# Patient Record
Sex: FEMALE | Race: WHITE | NOT HISPANIC OR LATINO | Employment: FULL TIME | URBAN - METROPOLITAN AREA
[De-identification: names, ages, dates, MRNs, and addresses within clinical notes are randomized per-mention and may not be internally consistent; named-entity substitution may affect disease eponyms.]

---

## 2017-02-10 ENCOUNTER — ALLSCRIPTS OFFICE VISIT (OUTPATIENT)
Dept: OTHER | Facility: OTHER | Age: 32
End: 2017-02-10

## 2017-03-29 ENCOUNTER — APPOINTMENT (OUTPATIENT)
Dept: LAB | Facility: HOSPITAL | Age: 32
End: 2017-03-29
Attending: INTERNAL MEDICINE
Payer: COMMERCIAL

## 2017-03-29 DIAGNOSIS — A04.8 OTHER SPECIFIED BACTERIAL INTESTINAL INFECTIONS: ICD-10-CM

## 2017-03-29 PROCEDURE — 87338 HPYLORI STOOL AG IA: CPT

## 2017-03-30 ENCOUNTER — GENERIC CONVERSION - ENCOUNTER (OUTPATIENT)
Dept: OTHER | Facility: OTHER | Age: 32
End: 2017-03-30

## 2017-03-30 LAB — H PYLORI AG STL QL IA: NEGATIVE

## 2017-05-09 ENCOUNTER — ALLSCRIPTS OFFICE VISIT (OUTPATIENT)
Dept: OTHER | Facility: OTHER | Age: 32
End: 2017-05-09

## 2017-05-09 DIAGNOSIS — N97.9 FEMALE INFERTILITY: ICD-10-CM

## 2017-05-09 DIAGNOSIS — Z00.00 ENCOUNTER FOR GENERAL ADULT MEDICAL EXAMINATION WITHOUT ABNORMAL FINDINGS: ICD-10-CM

## 2017-05-09 DIAGNOSIS — Z77.21 CONTACT WITH AND (SUSPECTED) EXPOSURE TO POTENTIALLY HAZARDOUS BODY FLUIDS: ICD-10-CM

## 2017-05-09 DIAGNOSIS — K21.9 GASTRO-ESOPHAGEAL REFLUX DISEASE WITHOUT ESOPHAGITIS: ICD-10-CM

## 2017-05-09 DIAGNOSIS — R10.13 EPIGASTRIC PAIN: ICD-10-CM

## 2017-05-11 ENCOUNTER — HOSPITAL ENCOUNTER (OUTPATIENT)
Dept: RADIOLOGY | Facility: HOSPITAL | Age: 32
Discharge: HOME | End: 2017-05-11
Payer: COMMERCIAL

## 2017-05-11 DIAGNOSIS — R10.13 EPIGASTRIC PAIN: ICD-10-CM

## 2017-05-11 PROCEDURE — 76705 ECHO EXAM OF ABDOMEN: CPT

## 2017-05-15 ENCOUNTER — ALLSCRIPTS OFFICE VISIT (OUTPATIENT)
Dept: OTHER | Facility: OTHER | Age: 32
End: 2017-05-15

## 2017-05-26 ENCOUNTER — TRANSCRIBE ORDERS (OUTPATIENT)
Dept: ADMINISTRATIVE | Facility: HOSPITAL | Age: 32
End: 2017-05-26

## 2017-05-26 ENCOUNTER — ALLSCRIPTS OFFICE VISIT (OUTPATIENT)
Dept: OTHER | Facility: OTHER | Age: 32
End: 2017-05-26

## 2017-05-26 DIAGNOSIS — N97.9 PRIMARY FEMALE INFERTILITY: Primary | ICD-10-CM

## 2017-05-30 ENCOUNTER — GENERIC CONVERSION - ENCOUNTER (OUTPATIENT)
Dept: OTHER | Facility: OTHER | Age: 32
End: 2017-05-30

## 2017-07-29 ENCOUNTER — TRANSCRIBE ORDERS (OUTPATIENT)
Dept: LAB | Facility: CLINIC | Age: 32
End: 2017-07-29

## 2017-07-29 ENCOUNTER — APPOINTMENT (OUTPATIENT)
Dept: LAB | Facility: CLINIC | Age: 32
End: 2017-07-29
Payer: COMMERCIAL

## 2017-07-29 DIAGNOSIS — Z77.21 PERSONAL HISTORY OF EXPOSURE TO POTENTIALLY HAZARDOUS BODY FLUIDS: Primary | ICD-10-CM

## 2017-07-29 DIAGNOSIS — Z00.00 ENCOUNTER FOR GENERAL ADULT MEDICAL EXAMINATION WITHOUT ABNORMAL FINDINGS: ICD-10-CM

## 2017-07-29 DIAGNOSIS — Z77.21 PERSONAL HISTORY OF EXPOSURE TO POTENTIALLY HAZARDOUS BODY FLUIDS: ICD-10-CM

## 2017-07-29 DIAGNOSIS — K21.9 GASTRO-ESOPHAGEAL REFLUX DISEASE WITHOUT ESOPHAGITIS: ICD-10-CM

## 2017-07-29 DIAGNOSIS — Z77.21 CONTACT WITH AND (SUSPECTED) EXPOSURE TO POTENTIALLY HAZARDOUS BODY FLUIDS: ICD-10-CM

## 2017-07-29 LAB
ALBUMIN SERPL BCP-MCNC: 4.1 G/DL (ref 3.5–5)
ALP SERPL-CCNC: 78 U/L (ref 46–116)
ALT SERPL W P-5'-P-CCNC: 22 U/L (ref 12–78)
ANION GAP SERPL CALCULATED.3IONS-SCNC: 6 MMOL/L (ref 4–13)
AST SERPL W P-5'-P-CCNC: 17 U/L (ref 5–45)
BASOPHILS # BLD AUTO: 0.04 THOUSANDS/ΜL (ref 0–0.1)
BASOPHILS NFR BLD AUTO: 1 % (ref 0–1)
BILIRUB SERPL-MCNC: 0.6 MG/DL (ref 0.2–1)
BUN SERPL-MCNC: 9 MG/DL (ref 5–25)
CALCIUM SERPL-MCNC: 8.9 MG/DL (ref 8.3–10.1)
CHLORIDE SERPL-SCNC: 103 MMOL/L (ref 100–108)
CHOLEST SERPL-MCNC: 205 MG/DL (ref 50–200)
CO2 SERPL-SCNC: 29 MMOL/L (ref 21–32)
CREAT SERPL-MCNC: 0.64 MG/DL (ref 0.6–1.3)
EOSINOPHIL # BLD AUTO: 0.25 THOUSAND/ΜL (ref 0–0.61)
EOSINOPHIL NFR BLD AUTO: 4 % (ref 0–6)
ERYTHROCYTE [DISTWIDTH] IN BLOOD BY AUTOMATED COUNT: 13 % (ref 11.6–15.1)
EST. AVERAGE GLUCOSE BLD GHB EST-MCNC: 114 MG/DL
GFR SERPL CREATININE-BSD FRML MDRD: 118 ML/MIN/1.73SQ M
GLUCOSE P FAST SERPL-MCNC: 101 MG/DL (ref 65–99)
HBA1C MFR BLD: 5.6 % (ref 4.2–6.3)
HCT VFR BLD AUTO: 44 % (ref 34.8–46.1)
HDLC SERPL-MCNC: 66 MG/DL (ref 40–60)
HGB BLD-MCNC: 14.3 G/DL (ref 11.5–15.4)
LDLC SERPL CALC-MCNC: 125 MG/DL (ref 0–100)
LYMPHOCYTES # BLD AUTO: 2.64 THOUSANDS/ΜL (ref 0.6–4.47)
LYMPHOCYTES NFR BLD AUTO: 37 % (ref 14–44)
MCH RBC QN AUTO: 30.7 PG (ref 26.8–34.3)
MCHC RBC AUTO-ENTMCNC: 32.5 G/DL (ref 31.4–37.4)
MCV RBC AUTO: 94 FL (ref 82–98)
MONOCYTES # BLD AUTO: 0.58 THOUSAND/ΜL (ref 0.17–1.22)
MONOCYTES NFR BLD AUTO: 8 % (ref 4–12)
NEUTROPHILS # BLD AUTO: 3.59 THOUSANDS/ΜL (ref 1.85–7.62)
NEUTS SEG NFR BLD AUTO: 50 % (ref 43–75)
PLATELET # BLD AUTO: 301 THOUSANDS/UL (ref 149–390)
PMV BLD AUTO: 10.1 FL (ref 8.9–12.7)
POTASSIUM SERPL-SCNC: 4.5 MMOL/L (ref 3.5–5.3)
PROT SERPL-MCNC: 8.1 G/DL (ref 6.4–8.2)
RBC # BLD AUTO: 4.66 MILLION/UL (ref 3.81–5.12)
SODIUM SERPL-SCNC: 138 MMOL/L (ref 136–145)
TRIGL SERPL-MCNC: 68 MG/DL
WBC # BLD AUTO: 7.1 THOUSAND/UL (ref 4.31–10.16)

## 2017-07-29 PROCEDURE — 85025 COMPLETE CBC W/AUTO DIFF WBC: CPT

## 2017-07-29 PROCEDURE — 80061 LIPID PANEL: CPT

## 2017-07-29 PROCEDURE — 83036 HEMOGLOBIN GLYCOSYLATED A1C: CPT

## 2017-07-29 PROCEDURE — 36415 COLL VENOUS BLD VENIPUNCTURE: CPT

## 2017-07-29 PROCEDURE — 80053 COMPREHEN METABOLIC PANEL: CPT

## 2017-12-22 ENCOUNTER — ALLSCRIPTS OFFICE VISIT (OUTPATIENT)
Dept: OTHER | Facility: OTHER | Age: 32
End: 2017-12-22

## 2017-12-23 NOTE — PROGRESS NOTES
Assessment   1  Acute bronchitis, unspecified organism (466 0) (J20 9)    Plan   Acute bronchitis, unspecified organism    · Start: Azithromycin 250 MG Oral Tablet; TAKE 2 TABLETS ON DAY 1 THEN TAKE 1    TABLET A DAY FOR 4 DAYS   · Follow Up if Not Better Evaluation and Treatment  Follow-up  Status: Complete  Done:    40TCD7377   · Drink plenty of fluids ; Status:Complete;   Done: 29LMJ3876   · Use a cough medicine to help you get adequate rest ; Status:Complete;   Done:    59AYC0744    Discussion/Summary      1  acute bronchitis: Symptoms consistent with viral infection  I have prescribed a 5 day course of azithromycin and instructed the patient to see how she feels over the next couple of days; if her symptoms worsen or her fever persists on Sunday she should fill the prescription and take the antibiotic  Otherwise she will use supportive measures including increased fluids, OTC expectorant cough medicine, warm fluids, and rest  She has flonase at home and I suggested she use that 2 sprays BID while symptomatic  Pt verbalizes understanding  The patient was counseled regarding instructions for management,-- risk factor reductions,-- prognosis,-- impressions,-- risks and benefits of treatment options  The treatment plan was reviewed with the patient/guardian  The patient/guardian understands and agrees with the treatment plan      Chief Complaint   patient is here for a cold  History of Present Illness   HPI: Pt is a 33 y/o female who is seen today with a 3 day hx of cough and congestion with fever/chills, and body aches  Her cough is productive of thick white sputum  She denies chest pain but reports shortness of breath with heavy exertion  She denies headache, dizziness, or ear pain  She was ill with a GI bug in the days just prior to URI sx onset but reports it is resolved  no present N/v/D  Home treatments include dayquil, nyquil, and theraflu  She was immunized against influenza this season  Hospital Based Practices Required Assessment:      Pain Assessment      the patient states they do not have pain  Readiness To Learn: Receptive  Barriers To Learning: none  Education Completed: disease/condition,-- medications-- and-- further treatment/follow-up      Teaching Method: verbal      Person Taught: patient      Evaluation Of Learning: verbalized/demonstrated understanding      Review of Systems        Constitutional: fever,-- feeling poorly,-- chills-- and-- feeling tired, but-- as noted in HPI       ENT: sore throat, but-- as noted in HPI,-- no earache-- and-- no nasal discharge  Cardiovascular: no chest pain-- and-- no palpitations  Respiratory: cough-- and-- shortness of breath during exertion, but-- as noted in HPI,-- no shortness of breath-- and-- no wheezing  Gastrointestinal: no nausea,-- no vomiting-- and-- no diarrhea  Musculoskeletal: myalgias  Neurological: no headache-- and-- no dizziness  ROS reviewed  Active Problems   1  Anxiety (300 00) (F41 9)  2  Dyspepsia (536 8) (R10 13)  3  Exposure to blood or body fluid (V15 85) (Z77 21)  4  GERD without esophagitis (530 81) (K21 9)  5  History of cervical dysplasia (V13 22) (Z87 410)  6  Irregular periods/menstrual cycles (626 4) (N92 6)  7  Primary female infertility (628 9) (N97 9)    Past Medical History   Active Problems And Past Medical History Reviewed: The active problems and past medical history were reviewed and updated today  Surgical History   Surgical History Reviewed: The surgical history was reviewed and updated today  Social History    · Exercise habits   ·    · Never a smoker   · Sexually active   · Social alcohol use (Z78 9)  The social history was reviewed and updated today  The social history was reviewed and is unchanged  Family History   Family History Reviewed: The family history was reviewed and updated today  Current Meds   1  Pantoprazole Sodium 40 MG Oral Tablet Delayed Release; TAKE 1 TABLET DAILY; Therapy: 74UKK1590 to (Evaluate:12Uri2703)  Requested for: 46XPK0892; Last     Rx:71Uqi3245 Ordered  2  Prenatal TABS; Therapy: (Recorded:20Jan2016) to Recorded     The medication list was reviewed and updated today  Allergies   1  No Known Drug Allergies    Vitals    Recorded: 22Dec2017 12:54PM   Temperature 99 2 F   Heart Rate 103   Respiration 18   Systolic 689   Diastolic 70   Height 5 ft 2 in   Weight 149 lb 0 8 oz   BMI Calculated 27 26   BSA Calculated 1 69   O2 Saturation 97     Physical Exam        Constitutional      General appearance: No acute distress, well appearing and well nourished  appears tired  Eyes      Conjunctiva and lids: No swelling, erythema or discharge  Ears, Nose, Mouth, and Throat      External inspection of ears and nose: Normal        Otoscopic examination: Tympanic membranes translucent with normal light reflex  Canals patent without erythema  Nasal mucosa, septum, and turbinates: Abnormal   There was clear rhinorrhea from both nares  The bilateral nasal mucosa was edematous-- and-- red  Oropharynx: Abnormal   The posterior pharynx was erythematous, but-- did not have an exudate  There was no concretions of both tonsils  There was enlargement of the left tonsil  Pulmonary      Respiratory effort: No increased work of breathing or signs of respiratory distress  Auscultation of lungs: Clear to auscultation  Cardiovascular      Auscultation of heart: Normal rate and rhythm, normal S1 and S2, without murmurs  Lymphatic      Palpation of lymph nodes in neck: No lymphadenopathy         Psychiatric      Orientation to person, place, and time: Normal        Mood and affect: Normal           Signatures    Electronically signed by : Keke Morton, ; Dec 22 2017  1:32PM EST                       (Author)     Electronically signed by : David Ragsdale DO; Dec 22 2017  4:21PM EST                       (Author)

## 2018-01-05 ENCOUNTER — HOSPITAL ENCOUNTER (EMERGENCY)
Facility: HOSPITAL | Age: 33
Discharge: HOME | End: 2018-01-05
Admitting: EMERGENCY MEDICINE
Payer: OTHER MISCELLANEOUS

## 2018-01-05 VITALS
BODY MASS INDEX: 25.76 KG/M2 | RESPIRATION RATE: 18 BRPM | SYSTOLIC BLOOD PRESSURE: 135 MMHG | HEIGHT: 62 IN | OXYGEN SATURATION: 98 % | DIASTOLIC BLOOD PRESSURE: 83 MMHG | WEIGHT: 140 LBS | HEART RATE: 86 BPM | TEMPERATURE: 98.6 F

## 2018-01-05 DIAGNOSIS — S46.911A MUSCLE STRAIN OF RIGHT UPPER ARM, INITIAL ENCOUNTER: Primary | ICD-10-CM

## 2018-01-05 PROCEDURE — 99283 EMERGENCY DEPT VISIT LOW MDM: CPT

## 2018-01-05 NOTE — DISCHARGE INSTRUCTIONS
Muscle Strain   WHAT YOU NEED TO KNOW:   What is a muscle strain? A muscle strain is a twist, pull, or tear of a muscle or tendon  A tendon is a strong elastic tissue that connects a muscle to a bone  What causes a muscle strain? Stretching a muscle too much may cause a muscle strain  A strain may also happen when a muscle is used too much without rest  Leg muscle strains are more common in people who play sports, run, dance, and water-ski  Strains in the muscles of the abdomen may happen when you play volleyball, tennis, golf, or baseball and when you dive  Low back muscle strains may occur when you lift heavy objects or when you wrestle or do gymnastics  What are the types of muscle strains? · A mild strain  is also called a first-degree strain  It is a tear of a few muscle fibers with little swelling  You may have very little or no loss of muscle strength  · A moderate strain  is also called a second-degree strain  There is more damage to your muscle or tendon, and it is weaker than it was before the injury  · A severe strain  is also called a third-degree strain  This tear goes along the whole length of the muscle, and you are unable to use the muscle at all  What increases my risk of a muscle strain? · Older age    · Muscle fatigue (tiredness)    · Not warming up before exercise    · Past muscle injury, or going back to your usual activity before your injury has healed    · Stiff, tight, and weak muscles    · Training longer or farther than your usual time or distance     · Problems with your feet, or your legs being different lengths  What are the signs and symptoms of a muscle strain? The signs and symptoms of a muscle strain depend on how badly your muscle is injured  The signs and symptoms may or may not show up right away when the injury happens   You may have one or more of the following:  · Bruised skin on the area of your injured muscle    · Muscle soreness, cramps, or spasms    · Little or stiff muscle movement, or loss of muscle strength    · Swelling in the area of the injury    · Muscle pain that gets worse with activity, or pain that moves or spreads to another body area    · Crepitus (crackling sound or grating feeling) when you move your muscle  How is a muscle strain diagnosed? Your healthcare provider will ask about diseases or injuries you have had in the past  He may touch and press parts of your muscle  He may bend, stretch, or move your joint certain ways  You may also have any of the following tests:  · X-ray: This is a picture of the bones and tissues in your body  X-rays may be done to make sure that you did not break a bone when your muscle strain happened  · Magnetic resonance imaging: This test is also called MRI  During the MRI, pictures of your muscles are taken  An MRI may be used to check for tears or other muscle injuries  It may also be used to look at your joints, bones, or blood vessels  You may be given dye through your vein before the pictures are taken  This helps your body parts show up better  Tell your healthcare provider if you are allergic to shellfish (lobster, crab, or shrimp)  People who are allergic to shellfish may also be allergic to some dyes  · Computed tomography scan: This is also called CT scan  A x-ray machine uses a computer to take pictures of your arms, legs, back, or abdomen  It is used to check for muscle injuries, broken bones, and damaged blood vessels  · Ultrasound: An ultrasound uses sound waves to show pictures of your muscles and tissues on a monitor  What can I do to help a muscle strain heal?   · 3 to 7 days after the injury:  Use Rest, Ice, Compression, and Elevation (RICE) to help stop bruising and decrease pain and swelling  ¨ Rest:  Rest your muscle to allow your injury to heal  When the pain decreases, begin normal, slow movements   For mild and moderate muscle strains, you should rest your muscles for about 2 days  However, if you have a severe muscle strain, you should rest for 10 to 14 days  You may need to use crutches to walk if your muscle strain is in your legs or lower body  ¨ Ice:  Put an ice pack on the injured area  Put a towel between the ice pack and your skin  Do not put the ice pack directly on your skin  You can use a package of frozen peas instead of an ice pack  ¨ Compression:  You may need to wrap an elastic bandage around the area to decrease swelling  It should be tight enough for you to feel support  Do not wrap it too tightly  ¨ Elevation:  Keep the injured muscle raised above your heart if possible  For example, if you have a strain of your lower leg muscle, lie down and prop your leg up on pillows  This helps decrease pain and swelling  · 3 to 21 days after your injury:  Start to slowly and regularly exercise your strained muscle  This will help it heal  If you feel pain, decrease how hard you are exercising  · 1 to 6 weeks after your injury:  Stretch the injured muscle  Stretch the muscle for about 30 seconds  Do this 4 times a day  You may stretch the muscle until you feel a slight pull  Stop stretching if you feel pain  · 2 weeks to 6 months after your injury:  The goal of this phase is to return to the activity you were doing before the injury without hurting the muscle again  · 3 weeks to 6 months after your injury:  Keep stretching and strengthening your muscles to avoid injury  Slowly increase the time and distance that you exercise  You may still have signs and symptoms of muscle strain 6 months after the injury, even if you do things to help it heal  In this case, you may need surgery on the muscle  How is a muscle strain treated? · Medicines:      ¨ NSAIDs:  This medicine is used to decrease pain and inflammation  It can be bought without a doctor's order  NSAIDs can cause stomach bleeding or kidney problems if they are not taken correctly   Always read the medicine label and follow the directions on it before you use this medicine  You should only use this medicine for 3 to 7 days after the injury happened  ¨ Muscle relaxers  help decrease pain and muscle spasms  ¨ Steroid medicines: Your healthcare provider may recommend a steroid injection to decrease pain and inflammation  ¨ Local anesthetic:  This can be used to numb the are for a short time  This is often used if you have a muscle strain in your back  · Physical therapy:  A physical therapist teaches you exercises to help improve movement and strength, and to decrease pain  · Surgery:  healthcare providers may recommend surgery if your muscle strain does not heal after 6 months of treatments  Surgery may be done to drain blood that has pooled in your muscle  If your tendon was torn off of the bone, it may be put back with surgery  How can a muscle strain be prevented? · Always wear proper shoes when you play sports:  Replace your old running shoes with new ones often if you are a runner  Use special shoe inserts or arch supports to correct leg or foot problems  Ask your healthcare provider for more information on shoe supports  · Do warm up and cool down exercises:  Do stretching exercises before you work out or do sports activities  These exercises will help loosen and decrease stress on your muscles  Cool down and stretch after your workout  Do not stop and rest after a workout without cooling down first            · Keep your muscles strong with strength training exercises:  Exercises such as weight lifting and stretching exercises help keep your muscles flexible and strong  A physical therapist or  may help you with these exercises  · Slowly start your exercise or sports training program:  Follow your healthcare provider's advice on when to start exercising  Slowly increase time, distance, and how often you train   Sudden increases in how often you train may cause you to injure your muscle again  When should I call my healthcare provider? · Your pain and swelling worsen or do not go away  · You have questions or concerns about your care or treatment  When should I seek immediate care? · You suddenly cannot feel or move your injured muscle  CARE AGREEMENT:   You have the right to help plan your care  Learn about your health condition and how it may be treated  Discuss treatment options with your caregivers to decide what care you want to receive  You always have the right to refuse treatment  The above information is an  only  It is not intended as medical advice for individual conditions or treatments  Talk to your doctor, nurse or pharmacist before following any medical regimen to see if it is safe and effective for you  © 2017 [employer]  Information is for End User's use only and may not be sold, redistributed or otherwise used for commercial purposes  All illustrations and images included in CareNotes® are the copyrighted property of [employer]  or [employer]

## 2018-01-05 NOTE — ED PROVIDER NOTES
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 yesterday  Patient reports that she pulled harder than she normally does when she lifts patients and states that initially she did not have pain however today she has pain over her right biceps  She states that she has full range of motion in her shoulder  Denies having any history of shoulder injuries in the past   Denies any fever, chills, numbness or tingling  Patient states that she has not taken anything for pain  None       Past Medical History:   Diagnosis Date    GERD (gastroesophageal reflux disease)        Past Surgical History:   Procedure Laterality Date    AL ESOPHAGOGASTRODUODENOSCOPY TRANSORAL DIAGNOSTIC N/A 6/30/2016    Procedure: ESOPHAGOGASTRODUODENOSCOPY (EGD); Surgeon: Tristen Diallo MD;  Location: [facility]; Service: Gastroenterology       History reviewed  No pertinent family history  I have reviewed and agree with the history as documented  Social History   Substance Use Topics    Smoking status: Former Smoker    Smokeless tobacco: Never Used    Alcohol use Yes      Comment: couple drinks a week        Review of Systems   Constitutional: Negative for chills and fever  Gastrointestinal: Negative for nausea and vomiting  Musculoskeletal: Positive for myalgias  Negative for arthralgias, joint swelling and neck pain  Skin: Negative for color change, pallor, rash and wound  Neurological: Negative for weakness and numbness         Physical Exam  ED Triage Vitals [01/05/18 1138]   Temperature Pulse Respirations Blood Pressure SpO2   98 6 °F (37 °C) 86 18 135/83 98 %      Temp Source Heart Rate Source Patient Position - Orthostatic VS BP Location FiO2 (%)   Oral Monitor Sitting Left arm --      Pain Score       3           Orthostatic Vital Signs  Vitals:    01/05/18 1138   BP: 135/83   Pulse: 86   Patient Position - Orthostatic VS: Sitting       Physical Exam   Constitutional: She is oriented to person, place, and time  She appears well-developed and well-nourished  She is cooperative  No distress  HENT:   Head: Normocephalic and atraumatic  Cardiovascular: Normal rate and normal heart sounds  No murmur heard  Pulmonary/Chest: Effort normal and breath sounds normal    Musculoskeletal: Normal range of motion  She exhibits tenderness  She exhibits no edema or deformity  Right upper arm: She exhibits tenderness  She exhibits no bony tenderness, no swelling, no edema, no deformity and no laceration  Arms:  Full AROM and 5/5 strength in UE b/l  NVI  ttp over right biceps muscle  No bruising, swelling, erythema noted  Neurological: She is alert and oriented to person, place, and time  Skin: Skin is warm  Capillary refill takes less than 2 seconds  No rash noted  She is not diaphoretic  No erythema  Psychiatric: She has a normal mood and affect         ED Medications  Medications - No data to display    Diagnostic Studies  Results Reviewed     None                 No orders to display              Procedures  Procedures       Phone Contacts  ED Phone Contact    ED Course  ED Course                                MDM  CritCare Time    Disposition  Final diagnoses:   Muscle strain of right upper arm, initial encounter     Time reflects when diagnosis was documented in both MDM as applicable and the Disposition within this note     Time User Action Codes Description Comment    1/5/2018  1:07 PM Fabby Martines Add [P24 [id number]] Muscle strain of right upper arm, initial encounter       ED Disposition     ED Disposition Condition Comment    Discharge  [facility] discharge to home/self care  Condition at discharge: Good        Follow-up Information     Follow up With Specialties Details Why [address], DO Internal Medicine Schedule an appointment as soon as possible for a visit in 1 week As needed [address]  [address] [place] [address] [address]  [phone]          There are no discharge medications for this patient  No discharge procedures on file      ED Provider  Electronically Signed by           Goldie Connors PA-C  01/09/18 1112

## 2018-01-10 NOTE — RESULT NOTES
Message   please let her know her H  pylori test is negative  Verified Results  (1) Jace Norton, STOOL 14GIN1114 07:17AM Carrillo Gone   TW Order Number: [id number]  TW Order Number: [id number]     Test Name Result Flag Reference   H PYLORI ANTIGEN Negative  Negative   Performing Comments: STOP OMEPRAZOLE FOR 2 WEEKS PRIOR TO TESTING  Performing Comments: STOP OMEPRAZOLE FOR 2 WEEKS PRIOR TO TESTING        Performed at:  [address] [employer] [address]  [id number]  : Jimmy Valentin MD, Phone:  [phone]

## 2018-01-11 NOTE — RESULT NOTES
Message   H pylori  indiced gastritis  Pylera for 10 days  sample given  Take omeprazole 20 mg po bid  Will check stool antigen  to confirm eradication  Verified Results  (1) TISSUE EXAM 30Jun2016 11:28AM Deepak Mixon     Test Name Result Flag Reference   LAB AP CASE REPORT (Report)     Surgical Pathology Report             Case: [id number]                   Authorizing Provider: Bridget Vasques MD      Collected:      06/30/2016 1128        Ordering Location:   [address] [place] [address]    Received:      07/01/2016 [address] [place] [address] Endoscopy                            Pathologist:      Annie Gupta MD                              Specimens:  A) - Duodenum, Duodenal bx, r/o celiac                                 B) - Stomach, Antral bx, r/o h pylori                                 C) - Stomach, Bx of gastric body, gastritis, r/o h pylori   LAB AP FINAL DIAGNOSIS (Report)     A  Duodenal biopsy:  - Benign, unremarkable duodenal mucosa  - No villous atrophy, no intraepithelial lymphocytosis or crypt   hyperplasia to suggest malabsorptive enteropathy   - No chronic or active duodenitis  - No glandular dysplasia and no evidence of malignancy  B  Gastric antral biopsy:  - Chronic focally active antral & oxyntic gastritis, POSITIVE for   curvilinear Helicobacter pylori, confirmed by immunohistochemical stains,   evaluated with appropriate positive & negative controls  - No atrophy or intestinal metaplasia identified   - No epithelial dysplasia and no evidence of malignancy  C  Gastric body biopsy:  - Chronic active oxyntic gastritis, POSITIVE for curvilinear Helicobacter   pylori, confirmed by immunohistochemical stains, evaluated with   appropriate positive & negative controls  - Separate portion of benign duodenal-type mucosa with nonspecific,   chronic inactive enteritis  - No epithelial dysplasia and no evidence of malignancy      Interpretation performed at [place] [address] [address] [place], [place] [address]  Electronically signed by Maciej Bolivar MD on 7/5/2016 at 4:12 PM   LAB AP SURGICAL ADDITIONAL INFORMATION (Report)     These tests were developed and their performance characteristics   determined by [facility] or [facility]  They may not be cleared or approved by the [place]  Food and   Drug Administration  The FDA has determined that such clearance or   approval is not necessary  These tests are used for clinical purposes  They should not be regarded as investigational or for research  This   laboratory has been approved by CLIA 88, designated as a high-complexity   laboratory and is qualified to perform these tests  LAB AP GROSS DESCRIPTION (Report)     A  The specimen is received in formalin, labeled with the patient's name   and hospital number, and is designated small bowel duodenal biopsy  The   specimen consists of 5 tan soft tissue fragments ranging in greatest   dimension from 0 1 to 0 3 centimeters  Entirely submitted  One cassette  B  The specimen is received in formalin, labeled with the patient's name   and hospital number, and is designated stomach antral biopsy  The   specimen consists of 2 tan soft tissue fragments each measuring 0 3   centimeters in greatest dimension  Entirely submitted  One cassette  C  The specimen is received in formalin, labeled with the patient's name   and hospital number, and is designated stomach biopsies gastric body  The specimen consists of 3 tan soft tissue fragments ranging in greatest   dimension from 0 1 to 0 5 centimeters  Entirely submitted  One cassette  Note: The estimated total formalin fixation time based upon information   provided by the submitting clinician and the standard processing schedule   is 26 5 hours  BMV  Plan  H  pylori infection    · (1) HELICOBACTER PYLORI ANTIGEN, STOOL; Status:Active;  Requested  for:20Oct2016;

## 2018-01-13 VITALS
SYSTOLIC BLOOD PRESSURE: 111 MMHG | HEIGHT: 62 IN | BODY MASS INDEX: 28.52 KG/M2 | RESPIRATION RATE: 18 BRPM | DIASTOLIC BLOOD PRESSURE: 77 MMHG | WEIGHT: 155 LBS | HEART RATE: 81 BPM | OXYGEN SATURATION: 98 % | TEMPERATURE: 97 F

## 2018-01-14 VITALS
BODY MASS INDEX: 28.52 KG/M2 | HEIGHT: 62 IN | SYSTOLIC BLOOD PRESSURE: 120 MMHG | DIASTOLIC BLOOD PRESSURE: 68 MMHG | WEIGHT: 155 LBS

## 2018-01-14 VITALS
SYSTOLIC BLOOD PRESSURE: 116 MMHG | WEIGHT: 157 LBS | BODY MASS INDEX: 28.89 KG/M2 | HEIGHT: 62 IN | HEART RATE: 80 BPM | OXYGEN SATURATION: 99 % | TEMPERATURE: 97.7 F | DIASTOLIC BLOOD PRESSURE: 78 MMHG

## 2018-01-14 VITALS
WEIGHT: 156 LBS | SYSTOLIC BLOOD PRESSURE: 100 MMHG | DIASTOLIC BLOOD PRESSURE: 60 MMHG | HEIGHT: 62 IN | BODY MASS INDEX: 28.71 KG/M2 | HEART RATE: 84 BPM

## 2018-01-23 VITALS
TEMPERATURE: 99.2 F | SYSTOLIC BLOOD PRESSURE: 122 MMHG | DIASTOLIC BLOOD PRESSURE: 70 MMHG | OXYGEN SATURATION: 97 % | WEIGHT: 149.05 LBS | BODY MASS INDEX: 27.43 KG/M2 | RESPIRATION RATE: 18 BRPM | HEART RATE: 103 BPM | HEIGHT: 62 IN

## 2018-01-23 NOTE — MISCELLANEOUS
Message  Return to work or school:  12/22/17   She is able to return to work on  12/27/17            Signatures   Electronically signed by : Cami Weeks, ; Dec 22 2017  1:15PM EST                       (Author)

## 2018-02-13 ENCOUNTER — OFFICE VISIT (OUTPATIENT)
Dept: OBGYN CLINIC | Facility: CLINIC | Age: 33
End: 2018-02-13
Payer: COMMERCIAL

## 2018-02-13 VITALS
DIASTOLIC BLOOD PRESSURE: 60 MMHG | BODY MASS INDEX: 24.41 KG/M2 | SYSTOLIC BLOOD PRESSURE: 102 MMHG | WEIGHT: 143 LBS | HEIGHT: 64 IN

## 2018-02-13 DIAGNOSIS — Z12.31 ENCOUNTER FOR SCREENING MAMMOGRAM FOR MALIGNANT NEOPLASM OF BREAST: Primary | ICD-10-CM

## 2018-02-13 PROCEDURE — 99385 PREV VISIT NEW AGE 18-39: CPT | Performed by: OBSTETRICS & GYNECOLOGY

## 2018-02-13 PROCEDURE — G0145 SCR C/V CYTO,THINLAYER,RESCR: HCPCS | Performed by: OBSTETRICS & GYNECOLOGY

## 2018-02-13 RX ORDER — PNV NO.95/FERROUS FUM/FOLIC AC 28MG-0.8MG
TABLET ORAL
COMMUNITY
End: 2021-01-07 | Stop reason: ALTCHOICE

## 2018-02-13 NOTE — PATIENT INSTRUCTIONS
The patient was told that her pelvic exam and breast exam were normal except for the fibrocystic areas in the breast   She was aware of this in this is been followed in the [facility] in the past   She was advised to see a reproductive endocrinologist if she does not achieve pregnancy by age 27

## 2018-02-13 NOTE — PROGRESS NOTES
Assessment/Plan:  A 34year-old patient is seen today for her yearly gyn evaluation  She has no specific complaints at this time  No problem-specific Assessment & Plan notes found for this encounter  Subjective:      Patient ID: Shabnam Auguste is a 34 y o  female  This 25-year-old patient has had regular menses recurring at about 31 day intervals for the last 6 months and lasting about 4 days  She has mild cramps with her menses  She does not bleed in between periods  She has no pain with intercourse  She has been trying to get pregnant for about a year and half with no success  The she has mild hot flashes at times  She has no chronic headaches or fainting spells  She does have normal bowel bladder function and does not wear liners or pads on a daily basis  She does have a history of gastritis  Review of Systems   Constitutional: Negative  HENT: Negative  Eyes: Negative  Respiratory: Negative  Cardiovascular: Negative  Gastrointestinal: Negative  Endocrine: Negative  Genitourinary: Negative  Musculoskeletal: Negative  Skin: Negative  Allergic/Immunologic: Negative  Neurological: Negative  Hematological: Negative  Psychiatric/Behavioral: Negative  Objective:    Vitals:    02/13/18 1307   BP: 102/60        Physical Exam   Constitutional: She is oriented to person, place, and time  She appears well-developed and well-nourished  HENT:   Head: Normocephalic  Neck: Normal range of motion  Neck supple  Cardiovascular: Normal rate, regular rhythm, normal heart sounds and intact distal pulses  Pulmonary/Chest: Effort normal and breath sounds normal    Abdominal: Soft  Bowel sounds are normal    Genitourinary: Uterus normal    Musculoskeletal: Normal range of motion  Neurological: She is alert and oriented to person, place, and time  Skin: Skin is warm and dry  Psychiatric: She has a normal mood and affect     Nursing note and vitals reviewed  pelvic exam reveals uterus to be anteverted mobile normal size and well supported  No ovarian masses are detected  There is no thickening in the cul-de-sac  Rectal exam shows no bladder masses in the rectum  Breast exam is normal and does show fibrocystic areas in the upper outer part of both breasts more so on the right than the left  She has been evaluated in the past at the [facility] for fibrocystic breasts

## 2018-02-16 LAB
LAB AP GYN PRIMARY INTERPRETATION: NORMAL
Lab: NORMAL

## 2018-02-23 ENCOUNTER — TELEPHONE (OUTPATIENT)
Dept: OBGYN CLINIC | Facility: CLINIC | Age: 33
End: 2018-02-23

## 2018-02-23 NOTE — TELEPHONE ENCOUNTER
Pt is calling for pap smear results, pt's number [phone]  pt stated a message could be left in vm

## 2018-05-01 ENCOUNTER — OFFICE VISIT (OUTPATIENT)
Dept: INTERNAL MEDICINE CLINIC | Facility: CLINIC | Age: 33
End: 2018-05-01
Payer: COMMERCIAL

## 2018-05-01 VITALS
DIASTOLIC BLOOD PRESSURE: 70 MMHG | WEIGHT: 141.6 LBS | TEMPERATURE: 99 F | OXYGEN SATURATION: 97 % | RESPIRATION RATE: 14 BRPM | HEART RATE: 80 BPM | SYSTOLIC BLOOD PRESSURE: 98 MMHG | HEIGHT: 64 IN | BODY MASS INDEX: 24.17 KG/M2

## 2018-05-01 DIAGNOSIS — Z13.21 ENCOUNTER FOR VITAMIN DEFICIENCY SCREENING: ICD-10-CM

## 2018-05-01 DIAGNOSIS — Z13.6 SCREENING FOR HEART DISEASE: ICD-10-CM

## 2018-05-01 DIAGNOSIS — E78.5 HYPERLIPIDEMIA, UNSPECIFIED HYPERLIPIDEMIA TYPE: ICD-10-CM

## 2018-05-01 DIAGNOSIS — Z78.9 ATTEMPTING TO CONCEIVE: ICD-10-CM

## 2018-05-01 DIAGNOSIS — Z13.1 SCREENING FOR DIABETES MELLITUS: ICD-10-CM

## 2018-05-01 DIAGNOSIS — Z00.00 ROUTINE GENERAL MEDICAL EXAMINATION AT A HEALTH CARE FACILITY: Primary | ICD-10-CM

## 2018-05-01 PROBLEM — K21.9 GERD (GASTROESOPHAGEAL REFLUX DISEASE): Status: RESOLVED | Noted: 2018-05-01 | Resolved: 2018-05-01

## 2018-05-01 PROCEDURE — 99395 PREV VISIT EST AGE 18-39: CPT | Performed by: NURSE PRACTITIONER

## 2018-05-02 NOTE — ASSESSMENT & PLAN NOTE
We did discuss the cycle of difficulty conceiving causing stress which makes conception difficult etc   Pt does try to not think about it but it is understandably upsetting to her  I did reinforce that her  and she have both been evaluated for infertility and have been given no reason why they shouldn't conceive

## 2018-05-02 NOTE — PROGRESS NOTES
Assessment/Plan:    Routine general medical examination at a [facility] care facility  Screening labs ordered as noted in HPI, pt to return for annual physical    Attempting to conceive  We did discuss the cycle of difficulty conceiving causing stress which makes conception difficult etc   Pt does try to not think about it but it is understandably upsetting to her  I did reinforce that her  and she have both been evaluated for infertility and have been given no reason why they shouldn't conceive  Hyperlipidemia  Abnormal FLP last July, pt is now eating a vegan diet and exercises frequently  I did order a repeat FLP as part of employment screenings  Will continue to monitor  Diagnoses and all orders for this visit:    Routine general medical examination at a health care facility  -     CBC and differential; Future    Encounter for vitamin deficiency screening  -     Vitamin D 25 hydroxy; Future    Screening for diabetes mellitus  -     Comprehensive metabolic panel; Future  -     HEMOGLOBIN A1C W/ EAG ESTIMATION; Future    Screening for heart disease  -     Lipid panel; Future    Attempting to conceive    Hyperlipidemia, unspecified hyperlipidemia type    Other orders  -     B Complex Vitamins (B COMPLEX-B12 PO); Take by mouth daily          Subjective:      Patient ID: Josee Lombardi is a 34 y o  female  Pt is a 34 y o  y/o female who is seen today for annual physical   PMH includes GERD and HLD  We did review age appropriate screening recommendations  Pt is a [employer] employee and requires an A1C and FLP for work  We reviewed dietary and exercise recommendations for prevention of heart disease and diabetes  Pt does exercise almost daily and she eats a vegan diet  She eats a diet very heavy in fruits and vegetables and protein in the form of nuts, beans, and lentils    Because she does eat a vegan diet I have also recommended that we check her CBC, CMP, and vitamin D in addition to her other labs  We did discuss that pt has been trying to conceive for 2 years without success  She and her  have both had fertility evaluations and no problems have been identified  This is a source of stress and concern for the pt  Pt denies chest pain, shortness of breath, palpitations, headache, dizziness, numbness, tingling  Appetite is normal, no N/V/D  Pt reports that since changing to a vegan diet her GERD symptoms and abd pain have resolved completely  The following portions of the patient's history were reviewed and updated as appropriate: allergies, current medications, past family history, past medical history, past social history, past surgical history and problem list     Review of Systems   Constitutional: Negative for activity change, appetite change, chills, fatigue, fever and unexpected weight change  HENT: Negative for hearing loss  Eyes: Negative for visual disturbance  Respiratory: Negative for cough, chest tightness and shortness of breath  Cardiovascular: Negative for chest pain, palpitations and leg swelling  Gastrointestinal: Negative for constipation, diarrhea, nausea and vomiting  Genitourinary: Positive for menstrual problem (light menses)  Negative for dysuria, frequency, pelvic pain, vaginal bleeding, vaginal discharge and vaginal pain  Musculoskeletal: Negative for arthralgias and myalgias  Neurological: Negative for dizziness, weakness, numbness and headaches  Psychiatric/Behavioral: Negative for dysphoric mood, self-injury, sleep disturbance and suicidal ideas  The patient is nervous/anxious  Objective:      BP 98/70   Pulse 80   Temp 99 °F (37 2 °C)   Resp 14   Ht 5' 4" (1 626 m)   Wt 64 2 kg (141 lb 9 6 oz)   SpO2 97%   BMI 24 31 kg/m²          Physical Exam   Constitutional: She is oriented to person, place, and time  Vital signs are normal  She appears well-developed and well-nourished  She is cooperative     HENT:   Head: Normocephalic  Right Ear: Hearing, tympanic membrane, external ear and ear canal normal    Left Ear: Hearing, tympanic membrane, external ear and ear canal normal    Nose: Nose normal    Mouth/Throat: Uvula is midline, oropharynx is clear and moist and mucous membranes are normal    Eyes: Conjunctivae and lids are normal  Pupils are equal, round, and reactive to light  Neck: Normal range of motion and full passive range of motion without pain  Neck supple  No JVD present  Carotid bruit is not present  No thyromegaly present  Cardiovascular: Normal rate, regular rhythm, S1 normal, S2 normal, normal heart sounds and intact distal pulses  No murmur heard  Pulmonary/Chest: Effort normal and breath sounds normal  No respiratory distress  Abdominal: Soft  Normal appearance and bowel sounds are normal  There is no hepatosplenomegaly  There is no tenderness  Musculoskeletal: Normal range of motion  She exhibits no edema  Lymphadenopathy:     She has no cervical adenopathy  Neurological: She is alert and oriented to person, place, and time  She has normal strength and normal reflexes  No sensory deficit  Skin: Skin is warm, dry and intact  Psychiatric: She has a normal mood and affect  Her speech is normal and behavior is normal  Judgment and thought content normal  Cognition and memory are normal    Vitals reviewed

## 2018-05-02 NOTE — ASSESSMENT & PLAN NOTE
Abnormal FLP last July, pt is now eating a vegan diet and exercises frequently  I did order a repeat FLP as part of employment screenings  Will continue to monitor

## 2018-05-12 ENCOUNTER — APPOINTMENT (OUTPATIENT)
Dept: LAB | Facility: CLINIC | Age: 33
End: 2018-05-12
Payer: COMMERCIAL

## 2018-05-12 DIAGNOSIS — Z13.1 SCREENING FOR DIABETES MELLITUS: ICD-10-CM

## 2018-05-12 DIAGNOSIS — Z00.00 ROUTINE GENERAL MEDICAL EXAMINATION AT A HEALTH CARE FACILITY: ICD-10-CM

## 2018-05-12 DIAGNOSIS — Z13.21 ENCOUNTER FOR VITAMIN DEFICIENCY SCREENING: ICD-10-CM

## 2018-05-12 DIAGNOSIS — Z13.6 SCREENING FOR HEART DISEASE: ICD-10-CM

## 2018-05-12 LAB
25(OH)D3 SERPL-MCNC: 21.2 NG/ML (ref 30–100)
ALBUMIN SERPL BCP-MCNC: 4.1 G/DL (ref 3.5–5)
ALP SERPL-CCNC: 63 U/L (ref 46–116)
ALT SERPL W P-5'-P-CCNC: 21 U/L (ref 12–78)
ANION GAP SERPL CALCULATED.3IONS-SCNC: 9 MMOL/L (ref 4–13)
AST SERPL W P-5'-P-CCNC: 12 U/L (ref 5–45)
BASOPHILS # BLD AUTO: 0.04 THOUSANDS/ΜL (ref 0–0.1)
BASOPHILS NFR BLD AUTO: 1 % (ref 0–1)
BILIRUB SERPL-MCNC: 0.6 MG/DL (ref 0.2–1)
BUN SERPL-MCNC: 7 MG/DL (ref 5–25)
CALCIUM SERPL-MCNC: 9.1 MG/DL (ref 8.3–10.1)
CHLORIDE SERPL-SCNC: 104 MMOL/L (ref 100–108)
CHOLEST SERPL-MCNC: 158 MG/DL (ref 50–200)
CO2 SERPL-SCNC: 28 MMOL/L (ref 21–32)
CREAT SERPL-MCNC: 0.56 MG/DL (ref 0.6–1.3)
EOSINOPHIL # BLD AUTO: 0.21 THOUSAND/ΜL (ref 0–0.61)
EOSINOPHIL NFR BLD AUTO: 3 % (ref 0–6)
ERYTHROCYTE [DISTWIDTH] IN BLOOD BY AUTOMATED COUNT: 12.5 % (ref 11.6–15.1)
EST. AVERAGE GLUCOSE BLD GHB EST-MCNC: 94 MG/DL
GFR SERPL CREATININE-BSD FRML MDRD: 123 ML/MIN/1.73SQ M
GLUCOSE P FAST SERPL-MCNC: 97 MG/DL (ref 65–99)
HBA1C MFR BLD: 4.9 % (ref 4.2–6.3)
HCT VFR BLD AUTO: 40.3 % (ref 34.8–46.1)
HDLC SERPL-MCNC: 51 MG/DL (ref 40–60)
HGB BLD-MCNC: 13.4 G/DL (ref 11.5–15.4)
LDLC SERPL CALC-MCNC: 98 MG/DL (ref 0–100)
LYMPHOCYTES # BLD AUTO: 3.05 THOUSANDS/ΜL (ref 0.6–4.47)
LYMPHOCYTES NFR BLD AUTO: 39 % (ref 14–44)
MCH RBC QN AUTO: 30.7 PG (ref 26.8–34.3)
MCHC RBC AUTO-ENTMCNC: 33.3 G/DL (ref 31.4–37.4)
MCV RBC AUTO: 92 FL (ref 82–98)
MONOCYTES # BLD AUTO: 0.54 THOUSAND/ΜL (ref 0.17–1.22)
MONOCYTES NFR BLD AUTO: 7 % (ref 4–12)
NEUTROPHILS # BLD AUTO: 3.95 THOUSANDS/ΜL (ref 1.85–7.62)
NEUTS SEG NFR BLD AUTO: 50 % (ref 43–75)
NONHDLC SERPL-MCNC: 107 MG/DL
PLATELET # BLD AUTO: 338 THOUSANDS/UL (ref 149–390)
PMV BLD AUTO: 9.5 FL (ref 8.9–12.7)
POTASSIUM SERPL-SCNC: 4 MMOL/L (ref 3.5–5.3)
PROT SERPL-MCNC: 7.8 G/DL (ref 6.4–8.2)
RBC # BLD AUTO: 4.37 MILLION/UL (ref 3.81–5.12)
SODIUM SERPL-SCNC: 141 MMOL/L (ref 136–145)
TRIGL SERPL-MCNC: 43 MG/DL
WBC # BLD AUTO: 7.79 THOUSAND/UL (ref 4.31–10.16)

## 2018-05-12 PROCEDURE — 83036 HEMOGLOBIN GLYCOSYLATED A1C: CPT

## 2018-05-12 PROCEDURE — 80053 COMPREHEN METABOLIC PANEL: CPT

## 2018-05-12 PROCEDURE — 36415 COLL VENOUS BLD VENIPUNCTURE: CPT

## 2018-05-12 PROCEDURE — 82306 VITAMIN D 25 HYDROXY: CPT

## 2018-05-12 PROCEDURE — 80061 LIPID PANEL: CPT

## 2018-05-12 PROCEDURE — 85025 COMPLETE CBC W/AUTO DIFF WBC: CPT

## 2018-05-15 ENCOUNTER — TELEPHONE (OUTPATIENT)
Dept: INTERNAL MEDICINE CLINIC | Facility: CLINIC | Age: 33
End: 2018-05-15

## 2018-06-07 ENCOUNTER — INITIAL PRENATAL (OUTPATIENT)
Dept: OBGYN CLINIC | Facility: CLINIC | Age: 33
End: 2018-06-07

## 2018-06-07 VITALS
DIASTOLIC BLOOD PRESSURE: 78 MMHG | BODY MASS INDEX: 27.05 KG/M2 | WEIGHT: 147 LBS | SYSTOLIC BLOOD PRESSURE: 112 MMHG | HEIGHT: 62 IN

## 2018-06-07 DIAGNOSIS — Z34.01 ENCOUNTER FOR SUPERVISION OF NORMAL FIRST PREGNANCY IN FIRST TRIMESTER: Primary | ICD-10-CM

## 2018-06-07 PROBLEM — J20.9 ACUTE BRONCHITIS: Status: ACTIVE | Noted: 2017-12-22

## 2018-06-07 PROBLEM — N97.9 PRIMARY FEMALE INFERTILITY: Status: ACTIVE | Noted: 2017-02-10

## 2018-06-07 PROBLEM — J20.9 ACUTE BRONCHITIS: Status: RESOLVED | Noted: 2017-12-22 | Resolved: 2018-06-07

## 2018-06-07 PROBLEM — R10.13 DYSPEPSIA: Status: ACTIVE | Noted: 2017-05-15

## 2018-06-07 PROBLEM — N92.6 IRREGULAR PERIODS/MENSTRUAL CYCLES: Status: ACTIVE | Noted: 2017-02-10

## 2018-06-07 PROCEDURE — PNV: Performed by: PHYSICIAN ASSISTANT

## 2018-06-07 RX ORDER — ACETAMINOPHEN 160 MG
2000 TABLET,DISINTEGRATING ORAL DAILY
Status: ON HOLD | COMMUNITY
End: 2021-10-14

## 2018-06-07 NOTE — PROGRESS NOTES
NOB: PAP and physical recently done 2/2018, consents signed, sequential screen discussed  Information for [facility] given  Reports occ cramping, mild less than menstrual more pulling pains  No FM, N/V, HA, VB, LOF, edema, domestic violence, or smoking  Tolerating PNV  Patient is vegan, getting protein through shakes, as well as lentils, beans, and nuts  Patient reports menses are very irregular, occurring every 28-35 days apart  Had issues getting pregnant took 3 years  TVUS: IUP gestational sac measuring 0 96cm consistent with 5w5d  Able to see small fetal pole to small to measure  Appreciated small flutter, unable to measure  Reviewed with patient size less than dates likely secondary to irregular cycles, will plan to get quant and progesterone to further evaluate and bring back in 1 week for via scan  OB panel lab work given including CF testing as well as progesterone and Quant  BFA done today, patient is planning on breastfeeding  Continue routine prenatal care  RTO in 1 week for via scan

## 2018-06-08 ENCOUNTER — TELEPHONE (OUTPATIENT)
Dept: OBGYN CLINIC | Facility: CLINIC | Age: 33
End: 2018-06-08

## 2018-06-08 ENCOUNTER — APPOINTMENT (OUTPATIENT)
Dept: LAB | Facility: CLINIC | Age: 33
End: 2018-06-08
Payer: COMMERCIAL

## 2018-06-08 DIAGNOSIS — Z34.01 ENCOUNTER FOR SUPERVISION OF NORMAL FIRST PREGNANCY IN FIRST TRIMESTER: Primary | ICD-10-CM

## 2018-06-08 DIAGNOSIS — Z34.01 ENCOUNTER FOR SUPERVISION OF NORMAL FIRST PREGNANCY IN FIRST TRIMESTER: ICD-10-CM

## 2018-06-08 LAB
ABO GROUP BLD: NORMAL
B-HCG SERPL-ACNC: 7328 MIU/ML
BASOPHILS # BLD AUTO: 0.03 THOUSANDS/ΜL (ref 0–0.1)
BASOPHILS NFR BLD AUTO: 0 % (ref 0–1)
BILIRUB UR QL STRIP: NEGATIVE
BLD GP AB SCN SERPL QL: NEGATIVE
CLARITY UR: CLEAR
COLOR UR: YELLOW
EOSINOPHIL # BLD AUTO: 0.16 THOUSAND/ΜL (ref 0–0.61)
EOSINOPHIL NFR BLD AUTO: 2 % (ref 0–6)
ERYTHROCYTE [DISTWIDTH] IN BLOOD BY AUTOMATED COUNT: 12.6 % (ref 11.6–15.1)
GLUCOSE UR STRIP-MCNC: NEGATIVE MG/DL
HBV SURFACE AG SER QL: NORMAL
HCT VFR BLD AUTO: 38.1 % (ref 34.8–46.1)
HCV AB SER QL: NORMAL
HGB BLD-MCNC: 12.6 G/DL (ref 11.5–15.4)
HGB UR QL STRIP.AUTO: NEGATIVE
KETONES UR STRIP-MCNC: NEGATIVE MG/DL
LEUKOCYTE ESTERASE UR QL STRIP: NEGATIVE
LYMPHOCYTES # BLD AUTO: 2.91 THOUSANDS/ΜL (ref 0.6–4.47)
LYMPHOCYTES NFR BLD AUTO: 37 % (ref 14–44)
MCH RBC QN AUTO: 30.8 PG (ref 26.8–34.3)
MCHC RBC AUTO-ENTMCNC: 33.1 G/DL (ref 31.4–37.4)
MCV RBC AUTO: 93 FL (ref 82–98)
MONOCYTES # BLD AUTO: 0.52 THOUSAND/ΜL (ref 0.17–1.22)
MONOCYTES NFR BLD AUTO: 7 % (ref 4–12)
NEUTROPHILS # BLD AUTO: 4.27 THOUSANDS/ΜL (ref 1.85–7.62)
NEUTS SEG NFR BLD AUTO: 54 % (ref 43–75)
NITRITE UR QL STRIP: NEGATIVE
PH UR STRIP.AUTO: 5.5 [PH] (ref 4.5–8)
PLATELET # BLD AUTO: 303 THOUSANDS/UL (ref 149–390)
PMV BLD AUTO: 10 FL (ref 8.9–12.7)
PROGEST SERPL-MCNC: 5.1 NG/ML
PROT UR STRIP-MCNC: NEGATIVE MG/DL
RBC # BLD AUTO: 4.09 MILLION/UL (ref 3.81–5.12)
RH BLD: NEGATIVE
RPR SER QL: NORMAL
RUBV IGG SERPL IA-ACNC: 32.3 IU/ML
SP GR UR STRIP.AUTO: 1.01 (ref 1–1.03)
SPECIMEN EXPIRATION DATE: NORMAL
UROBILINOGEN UR QL STRIP.AUTO: 0.2 E.U./DL
WBC # BLD AUTO: 7.89 THOUSAND/UL (ref 4.31–10.16)

## 2018-06-08 PROCEDURE — 86803 HEPATITIS C AB TEST: CPT

## 2018-06-08 PROCEDURE — 84702 CHORIONIC GONADOTROPIN TEST: CPT

## 2018-06-08 PROCEDURE — 80081 OBSTETRIC PANEL INC HIV TSTG: CPT

## 2018-06-08 PROCEDURE — 81003 URINALYSIS AUTO W/O SCOPE: CPT

## 2018-06-08 PROCEDURE — 84144 ASSAY OF PROGESTERONE: CPT

## 2018-06-08 PROCEDURE — 36415 COLL VENOUS BLD VENIPUNCTURE: CPT

## 2018-06-11 ENCOUNTER — TRANSCRIBE ORDERS (OUTPATIENT)
Dept: LAB | Facility: CLINIC | Age: 33
End: 2018-06-11

## 2018-06-11 ENCOUNTER — TELEPHONE (OUTPATIENT)
Dept: OBGYN CLINIC | Facility: CLINIC | Age: 33
End: 2018-06-11

## 2018-06-11 ENCOUNTER — APPOINTMENT (OUTPATIENT)
Dept: LAB | Facility: CLINIC | Age: 33
End: 2018-06-11
Payer: COMMERCIAL

## 2018-06-11 DIAGNOSIS — Z34.01 ENCOUNTER FOR SUPERVISION OF NORMAL FIRST PREGNANCY IN FIRST TRIMESTER: ICD-10-CM

## 2018-06-11 LAB
B-HCG SERPL-ACNC: 7378 MIU/ML
HIV 1+2 AB+HIV1 P24 AG SERPL QL IA: NORMAL

## 2018-06-11 PROCEDURE — 84702 CHORIONIC GONADOTROPIN TEST: CPT

## 2018-06-11 PROCEDURE — 36415 COLL VENOUS BLD VENIPUNCTURE: CPT

## 2018-06-12 ENCOUNTER — TELEPHONE (OUTPATIENT)
Dept: OBGYN CLINIC | Facility: CLINIC | Age: 33
End: 2018-06-12

## 2018-06-12 ENCOUNTER — HOSPITAL ENCOUNTER (OUTPATIENT)
Dept: ULTRASOUND IMAGING | Facility: HOSPITAL | Age: 33
Discharge: HOME | End: 2018-06-12
Attending: PHYSICIAN ASSISTANT
Payer: COMMERCIAL

## 2018-06-12 DIAGNOSIS — Z3A.01 LESS THAN 8 WEEKS GESTATION OF PREGNANCY: ICD-10-CM

## 2018-06-12 DIAGNOSIS — Z3A.01 LESS THAN 8 WEEKS GESTATION OF PREGNANCY: Primary | ICD-10-CM

## 2018-06-12 PROCEDURE — 76801 OB US < 14 WKS SINGLE FETUS: CPT

## 2018-06-12 NOTE — TELEPHONE ENCOUNTER
Reviewed results in length with patient  Will plan to get one more ultrasound in 1 week to confirm (will be 2 weeks from initial scan)  Offered support, patient appropriately upset, would like tomorrow off to process, note in system  Patient is  O negative blood type  Reports had scant brown when wiping after ultrasound but nothing since  Reviewed if bleeding starts to call for rhogam injection  Deja Rivera will order awhile  Reviewed what to expect as far as bleeding if starts and when to call or go to ER  Only use pads and tylenol  Reviewed options if confirmed next week including waiting for tissue to pass on own, cytotec, and D&E  Will plan to continue progesterone until confirmation next week

## 2018-06-15 ENCOUNTER — CLINICAL SUPPORT (OUTPATIENT)
Dept: OBGYN CLINIC | Facility: CLINIC | Age: 33
End: 2018-06-15

## 2018-06-15 VITALS
BODY MASS INDEX: 26.87 KG/M2 | DIASTOLIC BLOOD PRESSURE: 74 MMHG | SYSTOLIC BLOOD PRESSURE: 116 MMHG | HEIGHT: 62 IN | WEIGHT: 146 LBS

## 2018-06-15 DIAGNOSIS — O03.9 SPONTANEOUS PREGNANCY LOSS: Primary | ICD-10-CM

## 2018-06-15 PROCEDURE — 96372 THER/PROPH/DIAG INJ SC/IM: CPT | Performed by: OBSTETRICS & GYNECOLOGY

## 2018-08-01 DIAGNOSIS — O03.9 MISCARRIAGE: Primary | ICD-10-CM

## 2019-01-04 ENCOUNTER — INITIAL PRENATAL (OUTPATIENT)
Dept: OBGYN CLINIC | Facility: CLINIC | Age: 34
End: 2019-01-04

## 2019-01-04 VITALS
SYSTOLIC BLOOD PRESSURE: 120 MMHG | WEIGHT: 155 LBS | HEIGHT: 62 IN | DIASTOLIC BLOOD PRESSURE: 80 MMHG | BODY MASS INDEX: 28.52 KG/M2

## 2019-01-04 DIAGNOSIS — Z36.9 ANTENATAL SCREENING ENCOUNTER: ICD-10-CM

## 2019-01-04 DIAGNOSIS — Z3A.09 9 WEEKS GESTATION OF PREGNANCY: Primary | ICD-10-CM

## 2019-01-04 PROCEDURE — OBC: Performed by: NURSE PRACTITIONER

## 2019-01-04 NOTE — PROGRESS NOTES
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  Her LMP was 18  Dental care is up-to-date  She wears her seatbelt  Nutrition reviewed - she is vegan  TDAP and SQS testing all reviewed  No hx of MRSA  No travel plans  HIV consent signed  Initial prenatal labs ordered  Referral for the  center placed for genetic screening  She will schedule an appt  All questions and concerns addressed and patient verbalized understanding  RTO as scheduled for your initial OB physical      Diagnoses and all orders for this visit:    9 weeks gestation of pregnancy     screening encounter  -     Antibody screen  -     Hepatitis B surface antigen  -     CBC and differential  -     HIV 1/2 AG-AB combo; Future  -     RPR  -     Urine culture  -     Urinalysis with microscopic  -     Type and screen; Future  -     Rubella antibody, IgG  -     Ambulatory Referral to Maternal Fetal Medicine;  Future

## 2019-01-10 ENCOUNTER — APPOINTMENT (OUTPATIENT)
Dept: LAB | Facility: CLINIC | Age: 34
End: 2019-01-10
Payer: COMMERCIAL

## 2019-01-10 DIAGNOSIS — Z36.9 ANTENATAL SCREENING ENCOUNTER: ICD-10-CM

## 2019-01-10 LAB
ABO GROUP BLD: NORMAL
BACTERIA UR QL AUTO: ABNORMAL /HPF
BASOPHILS # BLD AUTO: 0.05 THOUSANDS/ΜL (ref 0–0.1)
BASOPHILS NFR BLD AUTO: 1 % (ref 0–1)
BILIRUB UR QL STRIP: NEGATIVE
BLD GP AB SCN SERPL QL: NEGATIVE
CLARITY UR: ABNORMAL
COLOR UR: YELLOW
EOSINOPHIL # BLD AUTO: 0.13 THOUSAND/ΜL (ref 0–0.61)
EOSINOPHIL NFR BLD AUTO: 1 % (ref 0–6)
ERYTHROCYTE [DISTWIDTH] IN BLOOD BY AUTOMATED COUNT: 11.9 % (ref 11.6–15.1)
GLUCOSE UR STRIP-MCNC: NEGATIVE MG/DL
HBV SURFACE AG SER QL: NORMAL
HCT VFR BLD AUTO: 42.8 % (ref 34.8–46.1)
HGB BLD-MCNC: 14.3 G/DL (ref 11.5–15.4)
HGB UR QL STRIP.AUTO: NEGATIVE
IMM GRANULOCYTES # BLD AUTO: 0.05 THOUSAND/UL (ref 0–0.2)
IMM GRANULOCYTES NFR BLD AUTO: 1 % (ref 0–2)
KETONES UR STRIP-MCNC: NEGATIVE MG/DL
LEUKOCYTE ESTERASE UR QL STRIP: ABNORMAL
LYMPHOCYTES # BLD AUTO: 3.01 THOUSANDS/ΜL (ref 0.6–4.47)
LYMPHOCYTES NFR BLD AUTO: 28 % (ref 14–44)
MCH RBC QN AUTO: 31.5 PG (ref 26.8–34.3)
MCHC RBC AUTO-ENTMCNC: 33.4 G/DL (ref 31.4–37.4)
MCV RBC AUTO: 94 FL (ref 82–98)
MONOCYTES # BLD AUTO: 0.57 THOUSAND/ΜL (ref 0.17–1.22)
MONOCYTES NFR BLD AUTO: 5 % (ref 4–12)
NEUTROPHILS # BLD AUTO: 6.91 THOUSANDS/ΜL (ref 1.85–7.62)
NEUTS SEG NFR BLD AUTO: 64 % (ref 43–75)
NITRITE UR QL STRIP: NEGATIVE
NON-SQ EPI CELLS URNS QL MICRO: ABNORMAL /HPF
NRBC BLD AUTO-RTO: 0 /100 WBCS
PH UR STRIP.AUTO: 6.5 [PH] (ref 4.5–8)
PLATELET # BLD AUTO: 358 THOUSANDS/UL (ref 149–390)
PMV BLD AUTO: 9.5 FL (ref 8.9–12.7)
PROT UR STRIP-MCNC: NEGATIVE MG/DL
RBC # BLD AUTO: 4.54 MILLION/UL (ref 3.81–5.12)
RBC #/AREA URNS AUTO: ABNORMAL /HPF
RH BLD: NEGATIVE
RUBV IGG SERPL IA-ACNC: 33.3 IU/ML
SP GR UR STRIP.AUTO: 1.02 (ref 1–1.03)
SPECIMEN EXPIRATION DATE: NORMAL
UROBILINOGEN UR QL STRIP.AUTO: 0.2 E.U./DL
WBC # BLD AUTO: 10.72 THOUSAND/UL (ref 4.31–10.16)
WBC #/AREA URNS AUTO: ABNORMAL /HPF

## 2019-01-10 PROCEDURE — 81001 URINALYSIS AUTO W/SCOPE: CPT | Performed by: NURSE PRACTITIONER

## 2019-01-10 PROCEDURE — 36415 COLL VENOUS BLD VENIPUNCTURE: CPT | Performed by: NURSE PRACTITIONER

## 2019-01-10 PROCEDURE — 80081 OBSTETRIC PANEL INC HIV TSTG: CPT | Performed by: NURSE PRACTITIONER

## 2019-01-10 PROCEDURE — 87086 URINE CULTURE/COLONY COUNT: CPT | Performed by: NURSE PRACTITIONER

## 2019-01-11 LAB
HIV 1+2 AB+HIV1 P24 AG SERPL QL IA: NORMAL
RPR SER QL: NORMAL

## 2019-01-12 LAB — BACTERIA UR CULT: NORMAL

## 2019-01-15 ENCOUNTER — ROUTINE PRENATAL (OUTPATIENT)
Dept: OBGYN CLINIC | Facility: CLINIC | Age: 34
End: 2019-01-15

## 2019-01-15 VITALS — WEIGHT: 155.4 LBS | DIASTOLIC BLOOD PRESSURE: 72 MMHG | SYSTOLIC BLOOD PRESSURE: 122 MMHG | BODY MASS INDEX: 28.42 KG/M2

## 2019-01-15 DIAGNOSIS — Z34.81 PRENATAL CARE, SUBSEQUENT PREGNANCY, FIRST TRIMESTER: Primary | ICD-10-CM

## 2019-01-15 DIAGNOSIS — Z36.9 ANTENATAL SCREENING ENCOUNTER: ICD-10-CM

## 2019-01-15 PROCEDURE — PNV: Performed by: OBSTETRICS & GYNECOLOGY

## 2019-01-15 NOTE — PROGRESS NOTES
This is a 58-year-old white female, she is a  2 para 0  Her risk factors include prior LEEP procedure, advanced maternal age, she is Rh negative, her size is less than dates, her EDC has been changed to 2019    She will make arrangements for a sequential screening test

## 2019-01-15 NOTE — PATIENT INSTRUCTIONS
Intrauterine pregnancy, size less than dates, EDC changed to August 20, 2019, to make arrangements for sequential screening test, the patient is Rh negative, prior history of LEEP procedure

## 2019-01-18 LAB
C TRACH RRNA SPEC QL NAA+PROBE: NOT DETECTED
CLINICAL INFO: NORMAL
CYTO CVX: NORMAL
CYTOLOGY CMNT CVX/VAG CYTO-IMP: NORMAL
DATE PREVIOUS BX: NORMAL
HPV E6+E7 MRNA CVX QL NAA+PROBE: NOT DETECTED
LMP START DATE: NORMAL
N GONORRHOEA RRNA SPEC QL NAA+PROBE: NOT DETECTED
SL AMB PREV. PAP:: NORMAL
SPECIMEN SOURCE CVX/VAG CYTO: NORMAL

## 2019-02-01 ENCOUNTER — ROUTINE PRENATAL (OUTPATIENT)
Dept: PERINATAL CARE | Facility: CLINIC | Age: 34
End: 2019-02-01
Payer: COMMERCIAL

## 2019-02-01 VITALS
WEIGHT: 157.8 LBS | HEART RATE: 108 BPM | HEIGHT: 62 IN | DIASTOLIC BLOOD PRESSURE: 86 MMHG | SYSTOLIC BLOOD PRESSURE: 132 MMHG | BODY MASS INDEX: 29.04 KG/M2

## 2019-02-01 DIAGNOSIS — Z3A.11 11 WEEKS GESTATION OF PREGNANCY: Primary | ICD-10-CM

## 2019-02-01 DIAGNOSIS — Z36.82 ENCOUNTER FOR (NT) NUCHAL TRANSLUCENCY SCAN: ICD-10-CM

## 2019-02-01 PROCEDURE — 76813 OB US NUCHAL MEAS 1 GEST: CPT | Performed by: OBSTETRICS & GYNECOLOGY

## 2019-02-01 PROCEDURE — 99201 PR OFFICE OUTPATIENT NEW 10 MINUTES: CPT | Performed by: OBSTETRICS & GYNECOLOGY

## 2019-02-01 NOTE — PROGRESS NOTES
The patient was seen today for an ultrasound  Please see ultrasound report (located under Ob Procedures) for additional details  Thank you very much for allowing us to participate in the care of this very nice patient  Should you have any questions, please do not hesitate to contact me  MD Carrie Collins  Attending Physician, Ana

## 2019-02-13 ENCOUNTER — TELEPHONE (OUTPATIENT)
Dept: PERINATAL CARE | Facility: CLINIC | Age: 34
End: 2019-02-13

## 2019-02-13 NOTE — TELEPHONE ENCOUNTER
Left VMM on # provided on communication consent  Part 1 Sequential Screen results provided and part 2 explained  Instructed pt to call office with questions or concerns  TRF mailed to pt

## 2019-02-13 NOTE — LETTER
Name: Tereso Llanes  : 1985  MRN: [id number]    To:   Centralized Faxing Team [phone]      The attached documents are complete  Please scan and add into the patient's chart  No other action is needed at this time  Please call us with any questions at [phone]      Rogelio Alba RN

## 2019-02-13 NOTE — LETTER
02/13/19  Virginia Plum  1985    Thank you for completing Part 1 of your Sequential Screen  To obtain a complete test result, please complete blood work for Part 2 Sequential Screen between the weeks of 3/2/2019 to 3/16/2019  Based on your insurance coverage, please use one of the following locations  Call our office for any questions at [phone]      Jose Blanchard [facility] [address]   [facility], [facility], [address]    Phone: [address]  [address] [facility], [facility] [address] [place]   Phone: [address]  [address] [place] [address] [address] [facility], [address] [facility]  Phone: [phone] [address] [facility], [address]   Phone: [phone] Rylee Ag for lab)    [address] [facility] [address]  [address]  [facility] [address], [address]; [place], [address] [facility] [address]   Phone: [phone]    [address] [facility]  [address] [facility], [facility]   Phone: [phone]    Sincerely,    Hermelindo Callaway RN

## 2019-02-14 ENCOUNTER — TELEPHONE (OUTPATIENT)
Dept: PERINATAL CARE | Facility: CLINIC | Age: 34
End: 2019-02-14

## 2019-02-14 NOTE — TELEPHONE ENCOUNTER
Returned pt's call regarding results of sequential screen part 1  Normal results given and instructions for part 2 reviewed  Pt verbalizes understanding and has no questions at this time

## 2019-02-15 ENCOUNTER — ROUTINE PRENATAL (OUTPATIENT)
Dept: OBGYN CLINIC | Facility: CLINIC | Age: 34
End: 2019-02-15

## 2019-02-15 VITALS — SYSTOLIC BLOOD PRESSURE: 122 MMHG | DIASTOLIC BLOOD PRESSURE: 80 MMHG | BODY MASS INDEX: 28.9 KG/M2 | WEIGHT: 158 LBS

## 2019-02-15 DIAGNOSIS — Z34.82 PRENATAL CARE, SUBSEQUENT PREGNANCY, SECOND TRIMESTER: Primary | ICD-10-CM

## 2019-02-15 PROCEDURE — PNV: Performed by: NURSE PRACTITIONER

## 2019-02-15 NOTE — PROGRESS NOTES
Patient is doing well  Denies LOF/Bleeding/Cramping  MFM report on 2/1/19 for first trimester genetic screening  The nuchal translucency measurement is <95% for  [place] Rump Length  The nasal bone appears to be present  Size=dates  Anterior placenta  Step one sequential screening complete      Level 2 US scheduled for 3/15    RTO in 4 weeks

## 2019-03-15 ENCOUNTER — ROUTINE PRENATAL (OUTPATIENT)
Dept: OBGYN CLINIC | Facility: CLINIC | Age: 34
End: 2019-03-15

## 2019-03-15 VITALS — BODY MASS INDEX: 30 KG/M2 | DIASTOLIC BLOOD PRESSURE: 70 MMHG | WEIGHT: 164 LBS | SYSTOLIC BLOOD PRESSURE: 90 MMHG

## 2019-03-15 DIAGNOSIS — Z34.82 PRENATAL CARE, SUBSEQUENT PREGNANCY, SECOND TRIMESTER: Primary | ICD-10-CM

## 2019-03-15 PROCEDURE — PNV: Performed by: OBSTETRICS & GYNECOLOGY

## 2019-03-15 NOTE — PATIENT INSTRUCTIONS
No complaints, no fetal movement get  Level 2 ultrasound scheduled  She is Rh negative  She has a history of prior LEEP procedure she should be following transvaginal clinic

## 2019-03-29 ENCOUNTER — ROUTINE PRENATAL (OUTPATIENT)
Dept: PERINATAL CARE | Facility: CLINIC | Age: 34
End: 2019-03-29
Payer: COMMERCIAL

## 2019-03-29 VITALS
WEIGHT: 165.2 LBS | SYSTOLIC BLOOD PRESSURE: 108 MMHG | HEART RATE: 98 BPM | DIASTOLIC BLOOD PRESSURE: 74 MMHG | BODY MASS INDEX: 30.4 KG/M2 | HEIGHT: 62 IN

## 2019-03-29 DIAGNOSIS — Z36.3 ENCOUNTER FOR ANTENATAL SCREENING FOR MALFORMATION USING ULTRASOUND: Primary | ICD-10-CM

## 2019-03-29 DIAGNOSIS — Z36.86 ENCOUNTER FOR ANTENATAL SCREENING FOR CERVICAL LENGTH: ICD-10-CM

## 2019-03-29 DIAGNOSIS — Z3A.19 19 WEEKS GESTATION OF PREGNANCY: ICD-10-CM

## 2019-03-29 PROCEDURE — 76805 OB US >/= 14 WKS SNGL FETUS: CPT | Performed by: OBSTETRICS & GYNECOLOGY

## 2019-03-29 PROCEDURE — 76817 TRANSVAGINAL US OBSTETRIC: CPT | Performed by: OBSTETRICS & GYNECOLOGY

## 2019-03-29 PROCEDURE — 99212 OFFICE O/P EST SF 10 MIN: CPT | Performed by: OBSTETRICS & GYNECOLOGY

## 2019-03-29 NOTE — PROGRESS NOTES
A transvaginal ultrasound was performed   Sonographer note on use of High Level Disinfection Process (Trophon) for transvaginal probe# 2  used, serial # [id number] [facility]

## 2019-03-31 NOTE — PROGRESS NOTES
The patient was seen today for an ultrasound  Please see ultrasound report (located under Ob Procedures) for additional details  Thank you very much for allowing us to participate in the care of this very nice patient  Should you have any questions, please do not hesitate to contact me  Tristen Colunga MD [address]  Attending Physician, Ana

## 2019-04-12 ENCOUNTER — ROUTINE PRENATAL (OUTPATIENT)
Dept: OBGYN CLINIC | Facility: CLINIC | Age: 34
End: 2019-04-12

## 2019-04-12 VITALS — WEIGHT: 167 LBS | DIASTOLIC BLOOD PRESSURE: 70 MMHG | SYSTOLIC BLOOD PRESSURE: 104 MMHG | BODY MASS INDEX: 30.54 KG/M2

## 2019-04-12 DIAGNOSIS — Z34.82 PRENATAL CARE, SUBSEQUENT PREGNANCY, SECOND TRIMESTER: Primary | ICD-10-CM

## 2019-04-12 PROCEDURE — PNV: Performed by: OBSTETRICS & GYNECOLOGY

## 2019-04-18 ENCOUNTER — ULTRASOUND (OUTPATIENT)
Dept: PERINATAL CARE | Facility: CLINIC | Age: 34
End: 2019-04-18
Payer: COMMERCIAL

## 2019-04-18 VITALS
DIASTOLIC BLOOD PRESSURE: 78 MMHG | HEIGHT: 62 IN | SYSTOLIC BLOOD PRESSURE: 125 MMHG | BODY MASS INDEX: 30.84 KG/M2 | HEART RATE: 99 BPM | WEIGHT: 167.6 LBS

## 2019-04-18 DIAGNOSIS — Z36.3 ENCOUNTER FOR ANTENATAL SCREENING FOR MALFORMATION: Primary | ICD-10-CM

## 2019-04-18 DIAGNOSIS — Z3A.22 22 WEEKS GESTATION OF PREGNANCY: ICD-10-CM

## 2019-04-18 PROCEDURE — 76816 OB US FOLLOW-UP PER FETUS: CPT | Performed by: OBSTETRICS & GYNECOLOGY

## 2019-04-19 PROBLEM — Z3A.22 22 WEEKS GESTATION OF PREGNANCY: Status: ACTIVE | Noted: 2019-04-19

## 2019-05-10 ENCOUNTER — ROUTINE PRENATAL (OUTPATIENT)
Dept: OBGYN CLINIC | Facility: CLINIC | Age: 34
End: 2019-05-10

## 2019-05-10 VITALS — BODY MASS INDEX: 31.64 KG/M2 | DIASTOLIC BLOOD PRESSURE: 66 MMHG | SYSTOLIC BLOOD PRESSURE: 110 MMHG | WEIGHT: 173 LBS

## 2019-05-10 DIAGNOSIS — Z36.9 ANTENATAL SCREENING ENCOUNTER: Primary | ICD-10-CM

## 2019-05-10 PROCEDURE — PNV: Performed by: OBSTETRICS & GYNECOLOGY

## 2019-05-30 ENCOUNTER — ROUTINE PRENATAL (OUTPATIENT)
Dept: OBGYN CLINIC | Facility: CLINIC | Age: 34
End: 2019-05-30
Payer: COMMERCIAL

## 2019-05-30 VITALS — BODY MASS INDEX: 31.9 KG/M2 | WEIGHT: 174.4 LBS | DIASTOLIC BLOOD PRESSURE: 74 MMHG | SYSTOLIC BLOOD PRESSURE: 118 MMHG

## 2019-05-30 DIAGNOSIS — Z34.83 PRENATAL CARE, SUBSEQUENT PREGNANCY, THIRD TRIMESTER: Primary | ICD-10-CM

## 2019-05-30 DIAGNOSIS — O26.893 RH NEGATIVE STATE IN ANTEPARTUM PERIOD, THIRD TRIMESTER: ICD-10-CM

## 2019-05-30 DIAGNOSIS — Z67.91 RH NEGATIVE STATE IN ANTEPARTUM PERIOD, THIRD TRIMESTER: ICD-10-CM

## 2019-05-30 DIAGNOSIS — Z23 NEED FOR TDAP VACCINATION: ICD-10-CM

## 2019-05-30 PROCEDURE — 90715 TDAP VACCINE 7 YRS/> IM: CPT

## 2019-05-30 PROCEDURE — PNV: Performed by: NURSE PRACTITIONER

## 2019-05-30 PROCEDURE — 90471 IMMUNIZATION ADMIN: CPT

## 2019-06-01 ENCOUNTER — APPOINTMENT (OUTPATIENT)
Dept: LAB | Facility: CLINIC | Age: 34
End: 2019-06-01
Payer: COMMERCIAL

## 2019-06-01 DIAGNOSIS — Z36.9 ANTENATAL SCREENING ENCOUNTER: ICD-10-CM

## 2019-06-01 LAB
BASOPHILS # BLD AUTO: 0.02 THOUSANDS/ΜL (ref 0–0.1)
BASOPHILS NFR BLD AUTO: 0 % (ref 0–1)
EOSINOPHIL # BLD AUTO: 0.14 THOUSAND/ΜL (ref 0–0.61)
EOSINOPHIL NFR BLD AUTO: 1 % (ref 0–6)
ERYTHROCYTE [DISTWIDTH] IN BLOOD BY AUTOMATED COUNT: 13.3 % (ref 11.6–15.1)
GLUCOSE 1H P 50 G GLC PO SERPL-MCNC: 195 MG/DL
HCT VFR BLD AUTO: 35.6 % (ref 34.8–46.1)
HGB BLD-MCNC: 11.7 G/DL (ref 11.5–15.4)
IMM GRANULOCYTES # BLD AUTO: 0.11 THOUSAND/UL (ref 0–0.2)
IMM GRANULOCYTES NFR BLD AUTO: 1 % (ref 0–2)
LYMPHOCYTES # BLD AUTO: 1.68 THOUSANDS/ΜL (ref 0.6–4.47)
LYMPHOCYTES NFR BLD AUTO: 16 % (ref 14–44)
MCH RBC QN AUTO: 31 PG (ref 26.8–34.3)
MCHC RBC AUTO-ENTMCNC: 32.9 G/DL (ref 31.4–37.4)
MCV RBC AUTO: 94 FL (ref 82–98)
MONOCYTES # BLD AUTO: 0.52 THOUSAND/ΜL (ref 0.17–1.22)
MONOCYTES NFR BLD AUTO: 5 % (ref 4–12)
NEUTROPHILS # BLD AUTO: 8.26 THOUSANDS/ΜL (ref 1.85–7.62)
NEUTS SEG NFR BLD AUTO: 77 % (ref 43–75)
NRBC BLD AUTO-RTO: 0 /100 WBCS
PLATELET # BLD AUTO: 305 THOUSANDS/UL (ref 149–390)
PMV BLD AUTO: 9.1 FL (ref 8.9–12.7)
RBC # BLD AUTO: 3.78 MILLION/UL (ref 3.81–5.12)
WBC # BLD AUTO: 10.73 THOUSAND/UL (ref 4.31–10.16)

## 2019-06-01 PROCEDURE — 86592 SYPHILIS TEST NON-TREP QUAL: CPT

## 2019-06-01 PROCEDURE — 82950 GLUCOSE TEST: CPT

## 2019-06-01 PROCEDURE — 36415 COLL VENOUS BLD VENIPUNCTURE: CPT

## 2019-06-01 PROCEDURE — 85025 COMPLETE CBC W/AUTO DIFF WBC: CPT

## 2019-06-03 ENCOUNTER — TELEPHONE (OUTPATIENT)
Dept: OBGYN CLINIC | Facility: CLINIC | Age: 34
End: 2019-06-03

## 2019-06-03 DIAGNOSIS — O24.419 GESTATIONAL DIABETES MELLITUS (GDM) IN THIRD TRIMESTER, GESTATIONAL DIABETES METHOD OF CONTROL UNSPECIFIED: Primary | ICD-10-CM

## 2019-06-03 LAB — RPR SER QL: NORMAL

## 2019-06-10 ENCOUNTER — OFFICE VISIT (OUTPATIENT)
Dept: PERINATAL CARE | Facility: CLINIC | Age: 34
End: 2019-06-10
Payer: COMMERCIAL

## 2019-06-10 VITALS
DIASTOLIC BLOOD PRESSURE: 69 MMHG | SYSTOLIC BLOOD PRESSURE: 102 MMHG | HEIGHT: 62 IN | HEART RATE: 96 BPM | BODY MASS INDEX: 32.2 KG/M2 | WEIGHT: 175 LBS

## 2019-06-10 DIAGNOSIS — IMO0002 BODY MASS INDEX (BMI) OF 25.0 TO 29.9: ICD-10-CM

## 2019-06-10 DIAGNOSIS — O24.410 DIET CONTROLLED GESTATIONAL DIABETES MELLITUS (GDM) IN THIRD TRIMESTER: ICD-10-CM

## 2019-06-10 DIAGNOSIS — Z3A.29 29 WEEKS GESTATION OF PREGNANCY: Primary | ICD-10-CM

## 2019-06-10 PROCEDURE — G0108 DIAB MANAGE TRN  PER INDIV: HCPCS | Performed by: DIETITIAN, REGISTERED

## 2019-06-12 RX ORDER — LANCETS 33 GAUGE
EACH MISCELLANEOUS
Qty: 100 EACH | Refills: 3 | Status: SHIPPED | OUTPATIENT
Start: 2019-06-12 | End: 2019-08-20

## 2019-06-12 RX ORDER — BLOOD SUGAR DIAGNOSTIC
STRIP MISCELLANEOUS
Qty: 100 EACH | Refills: 4 | Status: SHIPPED | OUTPATIENT
Start: 2019-06-12 | End: 2019-08-20

## 2019-06-13 ENCOUNTER — ROUTINE PRENATAL (OUTPATIENT)
Dept: OBGYN CLINIC | Facility: CLINIC | Age: 34
End: 2019-06-13

## 2019-06-13 VITALS
BODY MASS INDEX: 31.83 KG/M2 | HEIGHT: 62 IN | WEIGHT: 173 LBS | SYSTOLIC BLOOD PRESSURE: 112 MMHG | DIASTOLIC BLOOD PRESSURE: 72 MMHG

## 2019-06-13 DIAGNOSIS — Z34.03 ENCOUNTER FOR SUPERVISION OF NORMAL FIRST PREGNANCY IN THIRD TRIMESTER: Primary | ICD-10-CM

## 2019-06-13 PROCEDURE — PNV: Performed by: NURSE PRACTITIONER

## 2019-06-17 ENCOUNTER — DOCUMENTATION (OUTPATIENT)
Dept: PERINATAL CARE | Facility: OTHER | Age: 34
End: 2019-06-17

## 2019-06-17 DIAGNOSIS — O24.419 GESTATIONAL DIABETES MELLITUS (GDM) IN THIRD TRIMESTER, GESTATIONAL DIABETES METHOD OF CONTROL UNSPECIFIED: Primary | ICD-10-CM

## 2019-06-18 ENCOUNTER — DOCUMENTATION (OUTPATIENT)
Dept: PERINATAL CARE | Facility: CLINIC | Age: 34
End: 2019-06-18

## 2019-06-18 ENCOUNTER — OFFICE VISIT (OUTPATIENT)
Dept: PERINATAL CARE | Facility: CLINIC | Age: 34
End: 2019-06-18
Payer: COMMERCIAL

## 2019-06-18 VITALS
DIASTOLIC BLOOD PRESSURE: 69 MMHG | HEIGHT: 62 IN | SYSTOLIC BLOOD PRESSURE: 103 MMHG | WEIGHT: 173 LBS | HEART RATE: 112 BPM | BODY MASS INDEX: 31.83 KG/M2

## 2019-06-18 DIAGNOSIS — O24.410 DIET CONTROLLED GESTATIONAL DIABETES MELLITUS (GDM) IN THIRD TRIMESTER: Primary | ICD-10-CM

## 2019-06-18 DIAGNOSIS — Z3A.31 31 WEEKS GESTATION OF PREGNANCY: ICD-10-CM

## 2019-06-18 PROCEDURE — G0108 DIAB MANAGE TRN  PER INDIV: HCPCS

## 2019-06-25 ENCOUNTER — DOCUMENTATION (OUTPATIENT)
Dept: PERINATAL CARE | Facility: CLINIC | Age: 34
End: 2019-06-25

## 2019-06-27 ENCOUNTER — ROUTINE PRENATAL (OUTPATIENT)
Dept: OBGYN CLINIC | Facility: CLINIC | Age: 34
End: 2019-06-27

## 2019-06-27 ENCOUNTER — ULTRASOUND (OUTPATIENT)
Dept: PERINATAL CARE | Facility: CLINIC | Age: 34
End: 2019-06-27
Payer: COMMERCIAL

## 2019-06-27 VITALS
SYSTOLIC BLOOD PRESSURE: 110 MMHG | BODY MASS INDEX: 31.1 KG/M2 | WEIGHT: 169 LBS | HEIGHT: 62 IN | DIASTOLIC BLOOD PRESSURE: 68 MMHG

## 2019-06-27 VITALS
DIASTOLIC BLOOD PRESSURE: 66 MMHG | HEART RATE: 103 BPM | BODY MASS INDEX: 31.25 KG/M2 | SYSTOLIC BLOOD PRESSURE: 96 MMHG | HEIGHT: 62 IN | WEIGHT: 169.8 LBS

## 2019-06-27 DIAGNOSIS — O24.410 DIET CONTROLLED GESTATIONAL DIABETES MELLITUS (GDM) IN THIRD TRIMESTER: Primary | ICD-10-CM

## 2019-06-27 DIAGNOSIS — Z3A.32 32 WEEKS GESTATION OF PREGNANCY: ICD-10-CM

## 2019-06-27 DIAGNOSIS — Z34.83 PRENATAL CARE, SUBSEQUENT PREGNANCY, THIRD TRIMESTER: Primary | ICD-10-CM

## 2019-06-27 PROBLEM — N97.9 PRIMARY FEMALE INFERTILITY: Status: RESOLVED | Noted: 2017-02-10 | Resolved: 2019-06-27

## 2019-06-27 PROBLEM — N92.6 IRREGULAR PERIODS/MENSTRUAL CYCLES: Status: RESOLVED | Noted: 2017-02-10 | Resolved: 2019-06-27

## 2019-06-27 PROBLEM — Z78.9 ATTEMPTING TO CONCEIVE: Status: RESOLVED | Noted: 2018-05-01 | Resolved: 2019-06-27

## 2019-06-27 PROCEDURE — 76816 OB US FOLLOW-UP PER FETUS: CPT | Performed by: OBSTETRICS & GYNECOLOGY

## 2019-06-27 PROCEDURE — 99212 OFFICE O/P EST SF 10 MIN: CPT | Performed by: OBSTETRICS & GYNECOLOGY

## 2019-06-27 PROCEDURE — PNV: Performed by: NURSE PRACTITIONER

## 2019-07-01 ENCOUNTER — DOCUMENTATION (OUTPATIENT)
Dept: PERINATAL CARE | Facility: CLINIC | Age: 34
End: 2019-07-01

## 2019-07-01 NOTE — PROGRESS NOTES
Date:  19  RE: Leonel Garnica    : 1985  Estimated Date of Delivery: 19  EGA: 32w6d  OB/GYN: Zuly Corporal      Blood glucose log from patient e-mail  Current regimen:  2000 calorie GDM diet with 3 meals/snacks including protein  Pt is vegetarian; protein sources include beans, pea protein, cheese, nuts and nut butter  Self monitoring blood glucose fasting and 2 hours after start of meals with One Touch Verio meter  Plan:  Continue current regimen  Again reminded to complete labs ordered on 19 this week as able      Date due to report next:  Monday, 19    Lorrie Le RN BS CDE  Diabetes Educator   [facility] Pregnancy [facility]

## 2019-07-09 ENCOUNTER — DOCUMENTATION (OUTPATIENT)
Dept: PERINATAL CARE | Facility: CLINIC | Age: 34
End: 2019-07-09

## 2019-07-09 NOTE — PROGRESS NOTES
Date:  19  RE: Josee Lombardi    : 1985  Estimated Date of Delivery: 19  EGA: 34w0d  OB/GYN: Evelin Damon      Blood glucose log from patient e-mail  Current regimen:  2000 calorie GDM diet with 3 meals/snacks including protein  Pt is vegetarian; protein sources include beans, pea protein, cheese, nuts and nut butter  Self monitoring blood glucose fasting and 2 hours after start of meals with One Touch Verio meter  Plan:  Continue current regimen  Again reminded to complete labs ordered on 19 this week as able      Date due to report next:  Tuesday, 7-15-19    Lourdes Schwab RN BS CDE  Diabetes Educator   [facility]abetes [facility] Pregnancy [facility]

## 2019-07-10 ENCOUNTER — TELEPHONE (OUTPATIENT)
Dept: OBGYN CLINIC | Facility: CLINIC | Age: 34
End: 2019-07-10

## 2019-07-10 NOTE — TELEPHONE ENCOUNTER
OB - 34 1/7 wk - brown spotting with wiping today, no cramping, unrelated to intercourse  (+) FM  Has OB appt 7/11/19  To keep appt as sched & recall if any red bleeding prn/JSW

## 2019-07-11 ENCOUNTER — ROUTINE PRENATAL (OUTPATIENT)
Dept: OBGYN CLINIC | Facility: CLINIC | Age: 34
End: 2019-07-11

## 2019-07-11 VITALS — WEIGHT: 172 LBS | BODY MASS INDEX: 31.46 KG/M2 | DIASTOLIC BLOOD PRESSURE: 61 MMHG | SYSTOLIC BLOOD PRESSURE: 110 MMHG

## 2019-07-11 DIAGNOSIS — Z34.83 PRENATAL CARE, SUBSEQUENT PREGNANCY, THIRD TRIMESTER: Primary | ICD-10-CM

## 2019-07-11 PROCEDURE — PNV: Performed by: NURSE PRACTITIONER

## 2019-07-11 NOTE — LETTER
July 11, 2019     Patient: Dawit Dillon   YOB: 1985   Date of Visit: 7/11/2019       To Whom it May Concern:    Dawit Dillon is under my professional care  She was seen in my office on 7/11/2019  It is recommended that patient be given desk work light duty for the remainder of her pregnancy  If you have any questions or concerns, please don't hesitate to call           Sincerely,          SHARRON Salomon        CC: Dawit Dillon

## 2019-07-11 NOTE — PROGRESS NOTES
Patient is doing well  Denies LOF/Bleeding/Cramping  +FM  Continue fetal kick counts    19 MFM report at 32 2/7 weeks  EFW 4lbs 5 oz (41%)  Vertex  Recommend that the patient be delivered by 41  weeks gestation; however,   if delivery is delayed beyond 40 weeks gestation,  testing should   be initiated at that time  Growth scan at 36 weeks  GBS at next visit    RTO in 2 weeks  Brigido Arianne was seen today for routine prenatal visit      Diagnoses and all orders for this visit:    Prenatal care, subsequent pregnancy, third trimester

## 2019-07-18 ENCOUNTER — DOCUMENTATION (OUTPATIENT)
Dept: PERINATAL CARE | Facility: CLINIC | Age: 34
End: 2019-07-18

## 2019-07-18 NOTE — PROGRESS NOTES
Date:  19  RE: Mookie Miramontes    : 1985  Estimated Date of Delivery: 19  EGA: 35w2d  OB/GYN: Kirti Moe        Blood glucose log from patient e-mail  Current regimen:  2000 calorie GDM diet with 3 meals/snacks including protein  Pt is vegetarian; protein sources include beans, pea protein, cheese, nuts and nut butter  Self monitoring blood glucose fasting and 2 hours after start of meals with One Touch Verio meter  Plan:  Continue GDM diet and SMBG  Stay active if no restriction from your OB, up to 30 minutes a day of walking  Date due to report next:  Tuesday, 19 or sooner if needed       SHARRON Guerra, CDE  Diabetes Educator   Diabetes [facility] Pregnancy Program

## 2019-07-23 ENCOUNTER — DOCUMENTATION (OUTPATIENT)
Dept: PERINATAL CARE | Facility: CLINIC | Age: 34
End: 2019-07-23

## 2019-07-23 NOTE — PROGRESS NOTES
Date:  19  RE: Elin Lopez    : 1985  GUILLAUME: 19  EGA: 36w0d  OB/GYN: Marisol Myrick      Blood glucose log from patient e-mail  Current regimen:  2000 calorie GDM diet with 3 meals/snacks including recommended balance of carbohydrate, protein, and fat  Pt is vegetarian; protein sources include beans, pea protein, cheese, nuts and nut butter  Self monitoring blood glucose fasting and 2 hours after start of meals with One Touch Verio meter  Plan:  Continue current regimen      Date due to report next:  19      Lorrie Le RN BS CDE  Diabetes Educator   Diabetes [facility] Pregnancy Program

## 2019-07-24 PROBLEM — Z3A.36 36 WEEKS GESTATION OF PREGNANCY: Status: ACTIVE | Noted: 2019-04-19

## 2019-07-24 NOTE — PROGRESS NOTES
Chad Cleveland was seen today for an ultrasound for fetal growth  She is a gestational diabetic on diet and reports her blood sugars weekly and they are under good control on diet  She also has a history of a prior LEEP and denies any signs or symptoms of  labor  Her ultrasound today shows normal interval fetal growth and fluid  No further ultrasounds at this time are recommended  Recommend  continued reporting her blood sugars weekly  Those patients who are doing well on diet should start fetal testing with twice weekly NSTs and weekly fluid scans at 40 weeks with plans for delivery by 41 weeks  The majority of time (greater then 50%) was spent on counseling and coordination of care of this patient and /or family member  Approximate face to face time was 10 minutes          Mayte Greenberg MD

## 2019-07-25 ENCOUNTER — ULTRASOUND (OUTPATIENT)
Dept: PERINATAL CARE | Facility: CLINIC | Age: 34
End: 2019-07-25
Payer: COMMERCIAL

## 2019-07-25 ENCOUNTER — ROUTINE PRENATAL (OUTPATIENT)
Dept: OBGYN CLINIC | Facility: CLINIC | Age: 34
End: 2019-07-25

## 2019-07-25 VITALS
SYSTOLIC BLOOD PRESSURE: 107 MMHG | WEIGHT: 172 LBS | HEIGHT: 62 IN | BODY MASS INDEX: 31.65 KG/M2 | HEART RATE: 98 BPM | DIASTOLIC BLOOD PRESSURE: 74 MMHG

## 2019-07-25 VITALS
BODY MASS INDEX: 31.47 KG/M2 | SYSTOLIC BLOOD PRESSURE: 110 MMHG | DIASTOLIC BLOOD PRESSURE: 64 MMHG | WEIGHT: 171 LBS | HEIGHT: 62 IN

## 2019-07-25 DIAGNOSIS — O24.410 DIET CONTROLLED GESTATIONAL DIABETES MELLITUS (GDM) IN THIRD TRIMESTER: Primary | ICD-10-CM

## 2019-07-25 DIAGNOSIS — Z34.83 PRENATAL CARE, SUBSEQUENT PREGNANCY, THIRD TRIMESTER: Primary | ICD-10-CM

## 2019-07-25 DIAGNOSIS — Z36.85 ENCOUNTER FOR ANTENATAL SCREENING FOR STREPTOCOCCUS B: ICD-10-CM

## 2019-07-25 DIAGNOSIS — Z3A.36 36 WEEKS GESTATION OF PREGNANCY: ICD-10-CM

## 2019-07-25 LAB — EXTERNAL GROUP B STREP ANTIGEN: NEGATIVE

## 2019-07-25 PROCEDURE — 99212 OFFICE O/P EST SF 10 MIN: CPT | Performed by: OBSTETRICS & GYNECOLOGY

## 2019-07-25 PROCEDURE — 76816 OB US FOLLOW-UP PER FETUS: CPT | Performed by: OBSTETRICS & GYNECOLOGY

## 2019-07-25 PROCEDURE — PNV: Performed by: NURSE PRACTITIONER

## 2019-07-25 NOTE — LETTER
2019     Claire Pollock MD  [address]  [address]  [address] [place] [address]    Patient: Alma Jain   YOB: 1985   Date of Visit: 2019       Dear Dr Bushra Andre: Thank you for referring Alma Jain to me for evaluation  Below are my notes for this consultation  If you have questions, please do not hesitate to call me  I look forward to following your patient along with you  Sincerely,        Paul Khan MD        CC: No Recipients  Paul Khan MD  2019  8:27 PM  Sign at close encounter   Linda Enrique was seen today for an ultrasound for fetal growth  She is a gestational diabetic on diet and reports her blood sugars weekly and they are under good control on diet  She also has a history of a prior LEEP and denies any signs or symptoms of  labor  Her ultrasound today shows normal interval fetal growth and fluid  No further ultrasounds at this time are recommended  Recommend  continued reporting her blood sugars weekly  Those patients who are doing well on diet should start fetal testing with twice weekly NSTs and weekly fluid scans at 40 weeks with plans for delivery by 41 weeks  The majority of time (greater then 50%) was spent on counseling and coordination of care of this patient and /or family member  Approximate face to face time was 10 minutes          Paul Khan MD

## 2019-07-25 NOTE — PROGRESS NOTES
Patient is doing well  Denies LOF/Bleeding/Cramping  +FM    Seen at  today  Report is pending  Per patient EFW 6 lb 11 oz        Diagnoses and all orders for this visit:    Prenatal care, subsequent pregnancy, third trimester    Encounter for  screening for Streptococcus B  -     Strep Gp B Culture+Rflx

## 2019-08-02 ENCOUNTER — ROUTINE PRENATAL (OUTPATIENT)
Dept: OBGYN CLINIC | Facility: CLINIC | Age: 34
End: 2019-08-02

## 2019-08-02 ENCOUNTER — DOCUMENTATION (OUTPATIENT)
Dept: PERINATAL CARE | Facility: CLINIC | Age: 34
End: 2019-08-02

## 2019-08-02 VITALS — WEIGHT: 172.6 LBS | SYSTOLIC BLOOD PRESSURE: 112 MMHG | BODY MASS INDEX: 31.57 KG/M2 | DIASTOLIC BLOOD PRESSURE: 80 MMHG

## 2019-08-02 DIAGNOSIS — Z34.83 PRENATAL CARE, SUBSEQUENT PREGNANCY, THIRD TRIMESTER: Primary | ICD-10-CM

## 2019-08-02 PROCEDURE — PNV: Performed by: OBSTETRICS & GYNECOLOGY

## 2019-08-02 NOTE — PROGRESS NOTES
Date:  19  RE: Elin Lopez    : 1985  GUILLAUME: 19  EGA: 37w3d  OB/GYN: Marisolannie Myrick    Diet Controlled GDM third trimester      Blood glucose log from patient e-mail  Current regimen:  2000 calorie GDM diet with 3 meals/snacks including recommended balance of carbohydrate, protein, and fat  Pt is vegetarian; protein sources include beans, pea protein, cheese, nuts and nut butter  Self monitoring blood glucose fasting and 2 hours after start of meals with One Touch Verio meter  Plan:  Continue current regimen       : Fetal growth and ARJUN normal    Date due to report next:       Cameron Moe RD, LDN  Diabetes Educator   Diabetes [facility] Pregnancy Program

## 2019-08-02 NOTE — PATIENT INSTRUCTIONS
Positive fetal movement watch for large for gestational age, will do cervical exam on next visit    Denies any problem with contractions or spotting

## 2019-08-02 NOTE — PROGRESS NOTES
Positive fetal movement, gestational diabetic diet control doing well  Positive fetal movement  Return to office in 1 week

## 2019-08-09 ENCOUNTER — ROUTINE PRENATAL (OUTPATIENT)
Dept: OBGYN CLINIC | Facility: CLINIC | Age: 34
End: 2019-08-09

## 2019-08-09 ENCOUNTER — DOCUMENTATION (OUTPATIENT)
Dept: PERINATAL CARE | Facility: CLINIC | Age: 34
End: 2019-08-09

## 2019-08-09 VITALS — BODY MASS INDEX: 31.5 KG/M2 | SYSTOLIC BLOOD PRESSURE: 120 MMHG | DIASTOLIC BLOOD PRESSURE: 60 MMHG | WEIGHT: 172.2 LBS

## 2019-08-09 DIAGNOSIS — Z34.83 PRENATAL CARE, SUBSEQUENT PREGNANCY, THIRD TRIMESTER: Primary | ICD-10-CM

## 2019-08-09 PROCEDURE — PNV: Performed by: OBSTETRICS & GYNECOLOGY

## 2019-08-09 NOTE — PROGRESS NOTES
Doing well review signs symptoms of labor  Turn office in 1 week if undelivered  To continue to watch and monitor her glucose levels

## 2019-08-09 NOTE — PROGRESS NOTES
Date:  19  RE: Marco Diaz    : 1985  GUILLAUME: 19  EGA: 38w3d  OB/GYN: Jessy Tito    Diet Controlled GDM third trimester      Blood glucose log from patient e-mail  Current regimen:  2000 calorie GDM diet with 3 meals/snacks including recommended balance of carbohydrate, protein, and fat  Pt is vegetarian; protein sources include beans, pea protein, cheese, nuts and nut butter  Self monitoring blood glucose fasting and 2 hours after start of meals with One Touch Verio meter  Plan:  Continue current regimen       : Fetal growth and ARJUN normal    Date due to report next:  Tuesday, 8/15    Maria Dolores Shin RD, LDN  Diabetes Educator   Diabetes [facility] Pregnancy Program

## 2019-08-16 ENCOUNTER — ROUTINE PRENATAL (OUTPATIENT)
Dept: OBGYN CLINIC | Facility: CLINIC | Age: 34
End: 2019-08-16

## 2019-08-16 VITALS — WEIGHT: 168.8 LBS | BODY MASS INDEX: 30.87 KG/M2 | DIASTOLIC BLOOD PRESSURE: 70 MMHG | SYSTOLIC BLOOD PRESSURE: 110 MMHG

## 2019-08-16 DIAGNOSIS — Z34.83 PRENATAL CARE, SUBSEQUENT PREGNANCY, THIRD TRIMESTER: Primary | ICD-10-CM

## 2019-08-16 PROCEDURE — PNV: Performed by: OBSTETRICS & GYNECOLOGY

## 2019-08-16 NOTE — PROGRESS NOTES
Urine neg protein, neg glucose  GD (diet) well controlled (FBS 70's, post prandials 's)  Lost mucous plug this am, irreg contrac

## 2019-08-16 NOTE — PATIENT INSTRUCTIONS
The patient was instructed to watch out for signs of labor or rupture membranes return to office as needed

## 2019-08-16 NOTE — PROGRESS NOTES
Irregular contractions loss mucus plug  Some cervical change  Anticipate delivery within the next 48 hours

## 2019-08-17 ENCOUNTER — HOSPITAL ENCOUNTER (INPATIENT)
Facility: HOSPITAL | Age: 34
LOS: 3 days | Discharge: HOME | End: 2019-08-20
Attending: OBSTETRICS & GYNECOLOGY | Admitting: OBSTETRICS & GYNECOLOGY
Payer: COMMERCIAL

## 2019-08-17 ENCOUNTER — ANESTHESIA EVENT (INPATIENT)
Dept: ANESTHESIOLOGY | Facility: HOSPITAL | Age: 34
End: 2019-08-17
Payer: COMMERCIAL

## 2019-08-17 ENCOUNTER — ANESTHESIA (INPATIENT)
Dept: ANESTHESIOLOGY | Facility: HOSPITAL | Age: 34
End: 2019-08-17
Payer: COMMERCIAL

## 2019-08-17 LAB
ABO GROUP BLD: NORMAL
BLD GP AB SCN SERPL QL: NEGATIVE
ERYTHROCYTE [DISTWIDTH] IN BLOOD BY AUTOMATED COUNT: 14.1 % (ref 11.6–15.1)
HCT VFR BLD AUTO: 41.4 % (ref 34.8–46.1)
HGB BLD-MCNC: 13.8 G/DL (ref 11.5–15.4)
MCH RBC QN AUTO: 29.9 PG (ref 26.8–34.3)
MCHC RBC AUTO-ENTMCNC: 33.3 G/DL (ref 31.4–37.4)
MCV RBC AUTO: 90 FL (ref 82–98)
PLATELET # BLD AUTO: 256 THOUSANDS/UL (ref 149–390)
PMV BLD AUTO: 10.9 FL (ref 8.9–12.7)
RBC # BLD AUTO: 4.62 MILLION/UL (ref 3.81–5.12)
RH BLD: NEGATIVE
SPECIMEN EXPIRATION DATE: NORMAL
WBC # BLD AUTO: 13.31 THOUSAND/UL (ref 4.31–10.16)

## 2019-08-17 PROCEDURE — NC001 PR NO CHARGE: Performed by: OBSTETRICS & GYNECOLOGY

## 2019-08-17 PROCEDURE — 86901 BLOOD TYPING SEROLOGIC RH(D): CPT | Performed by: OBSTETRICS & GYNECOLOGY

## 2019-08-17 PROCEDURE — 85027 COMPLETE CBC AUTOMATED: CPT | Performed by: OBSTETRICS & GYNECOLOGY

## 2019-08-17 PROCEDURE — 4A1HXCZ MONITORING OF PRODUCTS OF CONCEPTION, CARDIAC RATE, EXTERNAL APPROACH: ICD-10-PCS | Performed by: OBSTETRICS & GYNECOLOGY

## 2019-08-17 PROCEDURE — 82948 REAGENT STRIP/BLOOD GLUCOSE: CPT

## 2019-08-17 PROCEDURE — 86900 BLOOD TYPING SEROLOGIC ABO: CPT | Performed by: OBSTETRICS & GYNECOLOGY

## 2019-08-17 PROCEDURE — 86850 RBC ANTIBODY SCREEN: CPT | Performed by: OBSTETRICS & GYNECOLOGY

## 2019-08-17 PROCEDURE — 86592 SYPHILIS TEST NON-TREP QUAL: CPT | Performed by: OBSTETRICS & GYNECOLOGY

## 2019-08-17 PROCEDURE — 99214 OFFICE O/P EST MOD 30 MIN: CPT

## 2019-08-17 RX ORDER — SODIUM CHLORIDE 9 MG/ML
50 INJECTION, SOLUTION INTRAVENOUS CONTINUOUS
Status: DISCONTINUED | OUTPATIENT
Start: 2019-08-17 | End: 2019-08-18

## 2019-08-17 RX ORDER — ONDANSETRON 2 MG/ML
4 INJECTION INTRAMUSCULAR; INTRAVENOUS EVERY 6 HOURS PRN
Status: DISCONTINUED | OUTPATIENT
Start: 2019-08-17 | End: 2019-08-18

## 2019-08-17 RX ORDER — OXYTOCIN/RINGER'S LACTATE 30/500 ML
1-30 PLASTIC BAG, INJECTION (ML) INTRAVENOUS
Status: DISCONTINUED | OUTPATIENT
Start: 2019-08-17 | End: 2019-08-20 | Stop reason: HOSPADM

## 2019-08-17 RX ADMIN — Medication 2 MILLI-UNITS/MIN: at 22:54

## 2019-08-17 RX ADMIN — SODIUM CHLORIDE 50 ML/HR: 0.9 INJECTION, SOLUTION INTRAVENOUS at 23:09

## 2019-08-17 NOTE — H&P
H&P Exam - Obstetrics   Devora Homans 28 y o  female MRN: [id number]  Unit/Bed#: LD Triage  Encounter: [id number]      History of Present Illness     Chief Complaint: contractions    HPI:  Devora Homans is a 28 y o  Sammie Filler female with an GUILLAUME of 2019, by Ultrasound at 39w4d weeks gestation who is being admitted for active labor  She states contractions every 5 minutes since last night, normal fetal activity, no vaginal bleeding, no leakage of fluid  This pregnancy has been complicated by G2QBW, normal glucose levels at home, maternal obesity, history of LEEP  Last estimated fetal weight  (2019) 3037g , percentile 60%   GBS negative   RH O-    Contractions: yes  Loss of fluid: no  Vaginal bleeding: no  Fetal movement: yes    She is Dr Sunitha Kirk patient  PREGNANCY COMPLICATIONS:   1) A8KPF  2) RH O-        OB History    Para Term  AB Living   2 0 0 0 1 0   SAB TAB Ectopic Multiple Live Births   1 0 0 0 0      # Outcome Date GA Lbr Josue/2nd Weight Sex Delivery Anes PTL Lv   2 Current            1 SAB                Baby complications/comments: ast estimated fetal weight  (2019) 3037g , percentile 60%     Review of Systems   Constitutional: Negative  HENT: Negative  Eyes: Negative  Respiratory: Negative  Cardiovascular: Negative  Gastrointestinal: Negative  Endocrine: Negative  Genitourinary: Negative  Musculoskeletal: Negative  Allergic/Immunologic: Negative  Neurological: Negative  Hematological: Negative  Psychiatric/Behavioral: Negative            Historical Information   Past Medical History:   Diagnosis Date    Abnormal Pap smear of cervix     GERD (gastroesophageal reflux disease)     Helicobacter pylori (H  pylori) infection     Varicella     as child     Past Surgical History:   Procedure Laterality Date    CERVICAL BIOPSY  W/ LOOP ELECTRODE EXCISION          ME ESOPHAGOGASTRODUODENOSCOPY TRANSORAL DIAGNOSTIC N/A 2016 Procedure: ESOPHAGOGASTRODUODENOSCOPY (EGD); Surgeon: Olayinka Contreras MD;  Location: [facility]; Service: Gastroenterology     Social History   Social History     Substance and Sexual Activity   Alcohol Use Yes    Comment: couple drinks a week     Social History     Substance and Sexual Activity   Drug Use No     Social History     Tobacco Use   Smoking Status Former Smoker    Packs/day: 0 25    Years: 8 00    Pack years: 2 00    Types: Cigarettes    Last attempt to quit: 2017    Years since quittin 6   Smokeless Tobacco Never Used     Family History: non-contributory    Meds/Allergies      Medications Prior to Admission   Medication    B Complex Vitamins (B COMPLEX-B12 PO)    Calcium-Magnesium-Vitamin D 185- MG-MG-UNIT CAPS    Cholecalciferol (VITAMIN D3) 2000 units capsule    Docosahexaenoic Acid (DHA OMEGA 3 PO)    IODINE, KELP, PO    ONETOUCH DELICA LANCETS 83C MISC    ONETOUCH VERIO test strip    Prenatal Multivit-Min-Fe-FA (PRENATAL/IRON) TABS      No Known Allergies    OBJECTIVE:    Vitals: Blood pressure 122/81, pulse 100, temperature 99 1 °F (37 3 °C), temperature source Axillary, resp  rate 18, height 5' 2" (1 575 m), weight 76 2 kg (168 lb), last menstrual period 2018, not currently breastfeeding  Body mass index is 30 73 kg/m²  Physical Exam   Constitutional: She appears well-developed  HENT:   Head: Normocephalic and atraumatic  Neck: Normal range of motion  Cardiovascular: Normal rate, regular rhythm, normal heart sounds and intact distal pulses  Pulmonary/Chest: Effort normal and breath sounds normal    Genitourinary:   Genitourinary Comments: Normal appearing external genitals, no skin lesions in vulvar area  No pain with mobilization  No evidence of vaginal discharge or bleeding               Cervix:  Dilation: 5  Effacement (%): 70  Station: -2  Cervical Characteristics: Mid-position  Membranes:    Fetal heart rate:   Baseline Rate: 135 bpm  Variability: Moderate 6-25 bpm  Accelerations:  At variable times  Decelerations: None  EFW: 8 pounds  Fetal presentation: vertex     [place]:   Contraction Frequency (minutes): 2-3  Contraction Duration (seconds): 50-70  Contraction Quality: Mild    GBS: neg    Prenatal Labs:   Blood Type:   Lab Results   Component Value Date/Time    ABO Grouping O 01/10/2019 10:22 AM     , D (Rh type):   Lab Results   Component Value Date/Time    Rh Factor Negative 01/10/2019 10:22 AM     , Antibody Screen: No results found for: ANTIBODYSCR , HCT/HGB:   Lab Results   Component Value Date/Time    Hematocrit 35 6 06/01/2019 09:25 AM    Hemoglobin 11 7 06/01/2019 09:25 AM      , MCV:   Lab Results   Component Value Date/Time    MCV 94 06/01/2019 09:25 AM      , Platelets:   Lab Results   Component Value Date/Time    Platelets 519 33/05/4620 09:25 AM      , 1 hour Glucola:   Lab Results   Component Value Date/Time    Glucose 195 (H) 06/01/2019 09:25 AM   , 3 hour GTT: No results found for: STLAUZR3VY, Varicella: No results found for: VARICELLAIGG    , Rubella:   Lab Results   Component Value Date/Time    Rubella IgG Quant 33 3 01/10/2019 10:22 AM        , VDRL/RPR:   Lab Results   Component Value Date/Time    RPR Non-Reactive 06/01/2019 09:25 AM      , Urine Culture/Screen:   Lab Results   Component Value Date/Time    Urine Culture  01/10/2019 10:33 AM     >100,000 cfu/ml Gamma Hemolytic Streptococcus NOT Enterococcus       , Urine Drug Screen: No results found for: AMPHETUR, BARBTUR, BDZUR, THCUR, COCAINEUR, METHADONEUR, OPIATEUR, PCPUR, MTHAMUR, ECSTASYUR, TRICYCLICSUR, Hep B:   Lab Results   Component Value Date/Time    Hepatitis B Surface Ag Non-reactive 01/10/2019 10:22 AM     , Hep C: No components found for: HEPCSAG, EXTHEPCSAG   , HIV:   Lab Results   Component Value Date/Time    HIV-1/HIV-2 Ab Non-Reactive 01/10/2019 10:22 AM     , Chlamydia: No results found for: EXTCHLAMYDIA  , Gonorrhea: No results found for: [employer] B Strep:  No results found for: STREPGRPB       Invasive Devices     None                   Assessment/Plan     ASSESSMENT:  Jadiel Campo is a 28 y o   female with an GUILLAUME of 2019,  at 39w4d weeks gestation who is being admitted for active labor  This pregnancy has been complicated by C5AFT, normal glucose levels at home, maternal obesity, history of LEEP  Cervix examination /-2,  Fetal monitory strip,Category I, [place] II leeanna every 2-3 minutes  Last estimated fetal weight  (2019) 3037g , percentile 60%   GBS negative   RH O-    PLAN:  - Admit to Labor and delivery  - CBC, RPR, Blood Type ordered   - GBS - status: no PCN for prophylaxis   - Analgesia and/or epidural at patient request  - Anticipate       Discussed with Dr Miri Donovan MD    This patient will be an INPATIENT  and I certify the anticipated length of stay is >2 Midnights      Izabella Tim MD  2019  7:16 PM

## 2019-08-18 LAB
BASE EXCESS BLDCOV CALC-SCNC: -5 MMOL/L (ref 1–9)
GLUCOSE SERPL-MCNC: 77 MG/DL (ref 65–140)
GLUCOSE SERPL-MCNC: 77 MG/DL (ref 65–140)
GLUCOSE SERPL-MCNC: 82 MG/DL (ref 65–140)
GLUCOSE SERPL-MCNC: 92 MG/DL (ref 65–140)
GLUCOSE SERPL-MCNC: 93 MG/DL (ref 65–140)
HCO3 BLDCOV-SCNC: 20 MMOL/L (ref 12.2–28.6)
OXYHGB MFR BLDCOV: 69.6 %
PCO2 BLDCOV: 37.2 MM HG (ref 27–43)
PH BLDCOV: 7.35 [PH] (ref 7.19–7.49)
PO2 BLDCOV: 28.9 MM HG (ref 15–45)
SAO2 % BLDCOV: 14.9 ML/DL

## 2019-08-18 PROCEDURE — 0HQ9XZZ REPAIR PERINEUM SKIN, EXTERNAL APPROACH: ICD-10-PCS | Performed by: OBSTETRICS & GYNECOLOGY

## 2019-08-18 PROCEDURE — 82805 BLOOD GASES W/O2 SATURATION: CPT | Performed by: OBSTETRICS & GYNECOLOGY

## 2019-08-18 PROCEDURE — 59400 OBSTETRICAL CARE: CPT | Performed by: OBSTETRICS & GYNECOLOGY

## 2019-08-18 PROCEDURE — 82948 REAGENT STRIP/BLOOD GLUCOSE: CPT

## 2019-08-18 RX ORDER — ONDANSETRON 2 MG/ML
4 INJECTION INTRAMUSCULAR; INTRAVENOUS EVERY 8 HOURS PRN
Status: DISCONTINUED | OUTPATIENT
Start: 2019-08-18 | End: 2019-08-20 | Stop reason: HOSPADM

## 2019-08-18 RX ORDER — IBUPROFEN 600 MG/1
600 TABLET ORAL EVERY 4 HOURS PRN
Status: DISCONTINUED | OUTPATIENT
Start: 2019-08-18 | End: 2019-08-20 | Stop reason: HOSPADM

## 2019-08-18 RX ORDER — METHYLERGONOVINE MALEATE 0.2 MG/ML
INJECTION INTRAVENOUS
Status: COMPLETED
Start: 2019-08-18 | End: 2019-08-18

## 2019-08-18 RX ORDER — CARBOPROST TROMETHAMINE 250 UG/ML
INJECTION, SOLUTION INTRAMUSCULAR
Status: DISPENSED
Start: 2019-08-18 | End: 2019-08-18

## 2019-08-18 RX ORDER — METHYLERGONOVINE MALEATE 0.2 MG/ML
0.2 INJECTION INTRAVENOUS ONCE
Status: COMPLETED | OUTPATIENT
Start: 2019-08-18 | End: 2019-08-18

## 2019-08-18 RX ORDER — DIAPER,BRIEF,INFANT-TODD,DISP
1 EACH MISCELLANEOUS AS NEEDED
Status: DISCONTINUED | OUTPATIENT
Start: 2019-08-18 | End: 2019-08-20 | Stop reason: HOSPADM

## 2019-08-18 RX ORDER — ACETAMINOPHEN 325 MG/1
650 TABLET ORAL EVERY 4 HOURS PRN
Status: DISCONTINUED | OUTPATIENT
Start: 2019-08-18 | End: 2019-08-20 | Stop reason: HOSPADM

## 2019-08-18 RX ORDER — OXYTOCIN/RINGER'S LACTATE 30/500 ML
62.5 PLASTIC BAG, INJECTION (ML) INTRAVENOUS
Status: DISPENSED | OUTPATIENT
Start: 2019-08-18 | End: 2019-08-18

## 2019-08-18 RX ORDER — LIDOCAINE HYDROCHLORIDE AND EPINEPHRINE 15; 5 MG/ML; UG/ML
INJECTION, SOLUTION EPIDURAL
Status: COMPLETED | OUTPATIENT
Start: 2019-08-18 | End: 2019-08-18

## 2019-08-18 RX ORDER — METHYLERGONOVINE MALEATE 0.2 MG/ML
INJECTION INTRAVENOUS
Status: DISPENSED
Start: 2019-08-18 | End: 2019-08-18

## 2019-08-18 RX ORDER — ROPIVACAINE HYDROCHLORIDE 2 MG/ML
INJECTION, SOLUTION EPIDURAL; INFILTRATION; PERINEURAL
Status: COMPLETED | OUTPATIENT
Start: 2019-08-18 | End: 2019-08-18

## 2019-08-18 RX ORDER — DOCUSATE SODIUM 100 MG/1
100 CAPSULE, LIQUID FILLED ORAL 2 TIMES DAILY
Status: DISCONTINUED | OUTPATIENT
Start: 2019-08-18 | End: 2019-08-20 | Stop reason: HOSPADM

## 2019-08-18 RX ORDER — MISOPROSTOL 200 UG/1
800 TABLET ORAL ONCE
Status: COMPLETED | OUTPATIENT
Start: 2019-08-18 | End: 2019-08-18

## 2019-08-18 RX ADMIN — LIDOCAINE HYDROCHLORIDE AND EPINEPHRINE 5 ML: 15; 5 INJECTION, SOLUTION EPIDURAL at 00:34

## 2019-08-18 RX ADMIN — Medication 250 MILLI-UNITS/MIN: at 10:44

## 2019-08-18 RX ADMIN — ROPIVACAINE HYDROCHLORIDE 6 ML: 2 INJECTION, SOLUTION EPIDURAL; INFILTRATION at 00:34

## 2019-08-18 RX ADMIN — SODIUM CHLORIDE 50 ML/HR: 0.9 INJECTION, SOLUTION INTRAVENOUS at 03:10

## 2019-08-18 RX ADMIN — WITCH HAZEL 1 PAD: 500 SOLUTION RECTAL; TOPICAL at 11:56

## 2019-08-18 RX ADMIN — HYDROCORTISONE 1 APPLICATION: 1 CREAM TOPICAL at 11:57

## 2019-08-18 RX ADMIN — BENZOCAINE AND LEVOMENTHOL: 200; 5 SPRAY TOPICAL at 11:57

## 2019-08-18 RX ADMIN — TRANEXAMIC ACID 1000 MG: 100 INJECTION, SOLUTION INTRAVENOUS at 10:43

## 2019-08-18 RX ADMIN — DOCUSATE SODIUM 100 MG: 100 CAPSULE, LIQUID FILLED ORAL at 11:56

## 2019-08-18 RX ADMIN — IBUPROFEN 600 MG: 600 TABLET ORAL at 11:56

## 2019-08-18 RX ADMIN — METHYLERGONOVINE MALEATE 0.2 MG: 0.2 INJECTION INTRAVENOUS at 10:29

## 2019-08-18 RX ADMIN — ROPIVACAINE HYDROCHLORIDE: 2 INJECTION, SOLUTION EPIDURAL; INFILTRATION at 00:39

## 2019-08-18 RX ADMIN — MISOPROSTOL 800 MCG: 200 TABLET ORAL at 10:37

## 2019-08-18 RX ADMIN — Medication 62.5 MILLI-UNITS/MIN: at 11:56

## 2019-08-18 RX ADMIN — DOCUSATE SODIUM 100 MG: 100 CAPSULE, LIQUID FILLED ORAL at 18:31

## 2019-08-18 RX ADMIN — METHYLERGONOVINE MALEATE 0.2 MG: 0.2 INJECTION, SOLUTION INTRAMUSCULAR; INTRAVENOUS at 10:29

## 2019-08-18 RX ADMIN — IBUPROFEN 600 MG: 600 TABLET ORAL at 21:59

## 2019-08-18 RX ADMIN — SODIUM CHLORIDE 125 ML/HR: 0.9 INJECTION, SOLUTION INTRAVENOUS at 10:43

## 2019-08-18 RX ADMIN — ROPIVACAINE HYDROCHLORIDE: 2 INJECTION, SOLUTION EPIDURAL; INFILTRATION at 07:19

## 2019-08-18 NOTE — LACTATION NOTE
This note was copied from a baby's chart  Mom states infant did had one feed so far for 5 minutes  Reviewed alerting techniques  Reviewed expected  infant feeding patterns in the first few days  Encouraged feeding on cue and also at least every 3 hours due to risk for hypoglycemia  Discussed sources of supplemental feedings and available alternative feeding methods if needed  Given admission breastfeeding pkat and same reviewed  Encouraged to call for additional assistance as needed

## 2019-08-18 NOTE — PLAN OF CARE
Problem: PAIN - ADULT  Goal: Verbalizes/displays adequate comfort level or baseline comfort level  Description  Interventions:  - Encourage patient to monitor pain and request assistance  - Assess pain using appropriate pain scale  - Administer analgesics based on type and severity of pain and evaluate response  - Implement non-pharmacological measures as appropriate and evaluate response  - Consider cultural and social influences on pain and pain management  - Notify physician/advanced practitioner if interventions unsuccessful or patient reports new pain  Outcome: Progressing     Problem: INFECTION - ADULT  Goal: Absence or prevention of progression during hospitalization  Description  INTERVENTIONS:  - Assess and monitor for signs and symptoms of infection  - Monitor lab/diagnostic results  - Monitor all insertion sites, i e  indwelling lines, tubes, and drains  - Monitor endotracheal if appropriate and nasal secretions for changes in amount and color  - [place] appropriate cooling/warming therapies per order  - Administer medications as ordered  - Instruct and encourage patient and family to use good hand hygiene technique  - Identify and instruct in appropriate isolation precautions for identified infection/condition  Outcome: Progressing  Goal: Absence of fever/infection during neutropenic period  Description  INTERVENTIONS:  - Monitor WBC    Outcome: Progressing     Problem: SAFETY ADULT  Goal: Patient will remain free of falls  Description  INTERVENTIONS:  - Assess patient frequently for physical needs  -  Identify cognitive and physical deficits and behaviors that affect risk of falls    -  [place] fall precautions as indicated by assessment   - Educate patient/family on patient safety including physical limitations  - Instruct patient to call for assistance with activity based on assessment  - Modify environment to reduce risk of injury  - Consider OT/PT consult to assist with strengthening/mobility  Outcome: Progressing  Goal: Maintain or return to baseline ADL function  Description  INTERVENTIONS:  -  Assess patient's ability to carry out ADLs; assess patient's baseline for ADL function and identify physical deficits which impact ability to perform ADLs (bathing, care of mouth/teeth, toileting, grooming, dressing, etc )  - Assess/evaluate cause of self-care deficits   - Assess range of motion  - Assess patient's mobility; develop plan if impaired  - Assess patient's need for assistive devices and provide as appropriate  - Encourage maximum independence but intervene and supervise when necessary  - Involve family in performance of ADLs  - Assess for home care needs following discharge   - Consider OT consult to assist with ADL evaluation and planning for discharge  - Provide patient education as appropriate  Outcome: Progressing  Goal: Maintain or return mobility status to optimal level  Description  INTERVENTIONS:  - Assess patient's baseline mobility status (ambulation, transfers, stairs, etc )    - Identify cognitive and physical deficits and behaviors that affect mobility  - Identify mobility aids required to assist with transfers and/or ambulation (gait belt, sit-to-stand, lift, walker, cane, etc )  - [place] fall precautions as indicated by assessment  - Record patient progress and toleration of activity level on Mobility SBAR; progress patient to next Phase/Stage  - Instruct patient to call for assistance with activity based on assessment  - Consider rehabilitation consult to assist with strengthening/weightbearing, etc   Outcome: Progressing     Problem: DISCHARGE PLANNING  Goal: Discharge to home or other facility with appropriate resources  Description  INTERVENTIONS:  - Identify barriers to discharge w/patient and caregiver  - Arrange for needed discharge resources and transportation as appropriate  - Identify discharge learning needs (meds, wound care, etc )  - Arrange for interpretive services to assist at discharge as needed  - Refer to Case Management Department for coordinating discharge planning if the patient needs post-hospital services based on physician/advanced practitioner order or complex needs related to functional status, cognitive ability, or social support system  Outcome: Progressing     Problem: POSTPARTUM  Goal: Experiences normal postpartum course  Description  INTERVENTIONS:  - Monitor maternal vital signs  - Assess uterine involution and lochia  Outcome: Progressing  Goal: Appropriate maternal -  bonding  Description  INTERVENTIONS:  - Identify family support  - Assess for appropriate maternal/infant bonding   -Encourage maternal/infant bonding opportunities  - Referral to  or  as needed  Outcome: Progressing  Goal: Establishment of infant feeding pattern  Description  INTERVENTIONS:  - Assess breast/bottle feeding  - Refer to lactation as needed  Outcome: Progressing  Goal: Incision(s), wounds(s) or drain site(s) healing without S/S of infection  Description  INTERVENTIONS  - Assess and document risk factors for skin impairment   - Assess and document dressing, incision, wound bed, drain sites and surrounding tissue  - Provide patient/ family education   Outcome: Progressing     Problem: Knowledge Deficit  Goal: Verbalizes understanding of labor plan  Description  Assess patient/family/caregiver's baseline knowledge level and ability to understand information  Provide education via patient/family/caregiver's preferred learning method at appropriate level of understanding  1  Provide teaching at level of understanding  2  Provide teaching via preferred learning method(s)  Outcome: Completed  Goal: Patient/family/caregiver demonstrates understanding of disease process, treatment plan, medications, and discharge instructions  Description  Complete learning assessment and assess knowledge base    Interventions:  - Provide teaching at level of understanding  - Provide teaching via preferred learning methods  Outcome: Completed     Problem: Labor & Delivery  Goal: Manages discomfort  Description  Assess and monitor for signs and symptoms of discomfort  Assess patient's pain level regularly and per hospital policy  Administer medications as ordered  Support use of nonpharmacological methods to help control pain such as distraction, imagery, relaxation, and application of heat and cold  Collaborate with interdisciplinary team and patient to determine appropriate pain management plan  1  Include patient in decisions related to comfort  2  Offer non-pharmacological pain management interventions  3  Report ineffective pain management to physician  Outcome: Completed  Goal: Patient vital signs are stable  Description  1  Assess vital signs - vaginal delivery    Outcome: Completed

## 2019-08-18 NOTE — PLAN OF CARE
Problem: Knowledge Deficit  Goal: Verbalizes understanding of labor plan  Description  Assess patient/family/caregiver's baseline knowledge level and ability to understand information  Provide education via patient/family/caregiver's preferred learning method at appropriate level of understanding  1  Provide teaching at level of understanding  2  Provide teaching via preferred learning method(s)  Outcome: Progressing  Goal: Patient/family/caregiver demonstrates understanding of disease process, treatment plan, medications, and discharge instructions  Description  Complete learning assessment and assess knowledge base  Interventions:  - Provide teaching at level of understanding  - Provide teaching via preferred learning methods  Outcome: Progressing     Problem: Labor & Delivery  Goal: Manages discomfort  Description  Assess and monitor for signs and symptoms of discomfort  Assess patient's pain level regularly and per hospital policy  Administer medications as ordered  Support use of nonpharmacological methods to help control pain such as distraction, imagery, relaxation, and application of heat and cold  Collaborate with interdisciplinary team and patient to determine appropriate pain management plan  1  Include patient in decisions related to comfort  2  Offer non-pharmacological pain management interventions  3  Report ineffective pain management to physician  Outcome: Progressing  Goal: Patient vital signs are stable  Description  1  Assess vital signs - vaginal delivery    Outcome: Progressing     Problem: PAIN - ADULT  Goal: Verbalizes/displays adequate comfort level or baseline comfort level  Description  Interventions:  - Encourage patient to monitor pain and request assistance  - Assess pain using appropriate pain scale  - Administer analgesics based on type and severity of pain and evaluate response  - Implement non-pharmacological measures as appropriate and evaluate response  - Consider cultural and social influences on pain and pain management  - Notify physician/advanced practitioner if interventions unsuccessful or patient reports new pain  Outcome: Progressing     Problem: INFECTION - ADULT  Goal: Absence or prevention of progression during hospitalization  Description  INTERVENTIONS:  - Assess and monitor for signs and symptoms of infection  - Monitor lab/diagnostic results  - Monitor all insertion sites, i e  indwelling lines, tubes, and drains  - Monitor endotracheal if appropriate and nasal secretions for changes in amount and color  - [place] appropriate cooling/warming therapies per order  - Administer medications as ordered  - Instruct and encourage patient and family to use good hand hygiene technique  - Identify and instruct in appropriate isolation precautions for identified infection/condition  Outcome: Progressing  Goal: Absence of fever/infection during neutropenic period  Description  INTERVENTIONS:  - Monitor WBC    Outcome: Progressing     Problem: SAFETY ADULT  Goal: Patient will remain free of falls  Description  INTERVENTIONS:  - Assess patient frequently for physical needs  -  Identify cognitive and physical deficits and behaviors that affect risk of falls    -  [place] fall precautions as indicated by assessment   - Educate patient/family on patient safety including physical limitations  - Instruct patient to call for assistance with activity based on assessment  - Modify environment to reduce risk of injury  - Consider OT/PT consult to assist with strengthening/mobility  Outcome: Progressing  Goal: Maintain or return to baseline ADL function  Description  INTERVENTIONS:  -  Assess patient's ability to carry out ADLs; assess patient's baseline for ADL function and identify physical deficits which impact ability to perform ADLs (bathing, care of mouth/teeth, toileting, grooming, dressing, etc )  - Assess/evaluate cause of self-care deficits   - Assess range of motion  - Assess patient's mobility; develop plan if impaired  - Assess patient's need for assistive devices and provide as appropriate  - Encourage maximum independence but intervene and supervise when necessary  - Involve family in performance of ADLs  - Assess for home care needs following discharge   - Consider OT consult to assist with ADL evaluation and planning for discharge  - Provide patient education as appropriate  Outcome: Progressing  Goal: Maintain or return mobility status to optimal level  Description  INTERVENTIONS:  - Assess patient's baseline mobility status (ambulation, transfers, stairs, etc )    - Identify cognitive and physical deficits and behaviors that affect mobility  - Identify mobility aids required to assist with transfers and/or ambulation (gait belt, sit-to-stand, lift, walker, cane, etc )  - [place] fall precautions as indicated by assessment  - Record patient progress and toleration of activity level on Mobility SBAR; progress patient to next Phase/Stage  - Instruct patient to call for assistance with activity based on assessment  - Consider rehabilitation consult to assist with strengthening/weightbearing, etc   Outcome: Progressing     Problem: DISCHARGE PLANNING  Goal: Discharge to home or other facility with appropriate resources  Description  INTERVENTIONS:  - Identify barriers to discharge w/patient and caregiver  - Arrange for needed discharge resources and transportation as appropriate  - Identify discharge learning needs (meds, wound care, etc )  - Arrange for interpretive services to assist at discharge as needed  - Refer to Case Management Department for coordinating discharge planning if the patient needs post-hospital services based on physician/advanced practitioner order or complex needs related to functional status, cognitive ability, or social support system  Outcome: Progressing

## 2019-08-18 NOTE — OB LABOR/OXYTOCIN SAFETY PROGRESS
Oxytocin Safety Progress Check Note - Sharyle Blow 28 y o  female MRN: [id number]    Unit/Bed#: -01 Encounter: [id number]    OB History    Para Term  AB Living   2 0 0 0 1 0   SAB TAB Ectopic Multiple Live Births   1 0 0 0 0     Gestational Age: 39w5d  Dose (michelle-units/min) Oxytocin: 16 michelle-units/min  Contraction Frequency (minutes): 1-2  Contraction Quality: Not applicable  Tachysystole: No   Dilation: Lip/rim (Comment)        Effacement (%): 100  Station: 1  Baseline Rate: 140 bpm  Fetal Heart Rate: 133 BPM  FHR Category: Category I          Notes/comments: Dr Jaida Mayorga aware              Emmie Conteh MD 2019 7:53 AM

## 2019-08-18 NOTE — ANESTHESIA PREPROCEDURE EVALUATION
Review of Systems/Medical History  Patient summary reviewed        Cardiovascular  Hyperlipidemia,    Pulmonary  Negative pulmonary ROS        GI/Hepatic  Negative GI/hepatic ROS   GERD ,        Negative  ROS        Endo/Other  Negative endo/other ROS Diabetes ,      GYN  Negative gynecology ROS          Hematology  Negative hematology ROS      Musculoskeletal  Negative musculoskeletal ROS        Neurology  Negative neurology ROS      Psychology   Negative psychology ROS Anxiety,              Physical Exam    Airway    Mallampati score: I  TM Distance: >3 FB  Neck ROM: full     Dental       Cardiovascular  Cardiovascular exam normal    Pulmonary  Pulmonary exam normal     Other Findings        Anesthesia Plan  ASA Score- 2     Anesthesia Type- epidural with ASA Monitors  Additional Monitors:   Airway Plan:         Plan Factors-    Induction- intravenous  Postoperative Plan-     Informed Consent- Anesthetic plan and risks discussed with patient  I personally reviewed this patient with the CRNA  Discussed and agreed on the Anesthesia Plan with the CRNA  Winston Fuller

## 2019-08-18 NOTE — OB LABOR/OXYTOCIN SAFETY PROGRESS
Oxytocin Safety Progress Check Note - Jadiel Campo 28 y o  female MRN: [id number]    Unit/Bed#: -01 Encounter: [id number]    OB History    Para Term  AB Living   2 0 0 0 1 0   SAB TAB Ectopic Multiple Live Births   1 0 0 0 0     Gestational Age: 39w5d  Dose (michelle-units/min) Oxytocin: 16 michelle-units/min  Contraction Frequency (minutes): 1 5-3  Contraction Quality: Moderate, Strong  Tachysystole: No   Dilation: 9        Effacement (%): 90  Station: -1  Baseline Rate: 140 bpm  Fetal Heart Rate: 136 BPM  FHR Category: Category I          Notes/comments: Patient comfortable with Epidural   Category I   We will recheck her in 1 hour               Hipolito Franz DO 2019 5:26 AM

## 2019-08-18 NOTE — OB LABOR/OXYTOCIN SAFETY PROGRESS
Labor Progress Note - Abdulaziz Gavel 28 y o  female MRN: [id number]    Unit/Bed#: -01 Encounter: [id number]    OB History    Para Term  AB Living   2 0 0 0 1 0   SAB TAB Ectopic Multiple Live Births   1 0 0 0 0     Gestational Age: 43w3d     Contraction Frequency (minutes): 2-3 minutes  Contraction Quality: Moderate  Tachysystole: No   Dilation: 5        Effacement (%): 80  Station: -2  Baseline Rate: 120 bpm  Fetal Heart Rate: 136 BPM  FHR Category: Category I          Notes/comments:   Intact membranes    Patient without significant cervical change we will start her in pitocin   FHT: category I, ctx q 2-3 min  Will recheck pt in 2 hours, sooner if patient uncomfortable            Nida Sawyer MD 2019 9:45 PM

## 2019-08-18 NOTE — L&D DELIVERY NOTE
DELIVERY NOTE  Rylee Leigh 28 y o  female MRN: [id number]  Unit/Bed#: -01 Encounter: [id number]    Obstetrician:    Dr Kimmy Young    Assistant:   Dr Maksim Pelletier    Pre-Delivery Diagnosis:   Patient Active Problem List   Diagnosis    Hyperlipidemia    Anxiety    Dyspepsia    GERD without esophagitis    36 weeks gestation of pregnancy    Diet controlled gestational diabetes mellitus (GDM) in third trimester         Post-Delivery Diagnosis:   Same as above - Delivered  1st degree laceration  Postpartum hemorrhage      Procedure:  Spontaneous vaginal delivery  Repair none and 1st degree spontaneous laceration        Anesthesia:  epidural    Specimens:   Cord blood obtained   Placenta; normal appearing, central insertion, intact   Arterial and venous blood gases (below)     Gases:  Umbilical Cord Venous Blood Gas:  Results from last 7 days   Lab Units 19  1024   PH COV  7 348   PCO2 COV mm HG 37 2   HCO3 COV mmol/L 20 0   BASE EXC COV mmol/L -5 0*   O2 CT CD VB mL/dL 14 9   O2 HGB, VENOUS CORD % 99 2     Umbilical Cord Arterial Blood Gas:        Quantitative Blood Loss:   1626 mL           Complications:    Shoulder dystocia  Postpartum hemorrhage    Brief Description of Labor Course:  Rylee Leigh is a 28 y o  [place] female at 39w5d who was admitted to L&D for labor  Her labor course was notable for augmentation with Pitocin at 2254 and she was artificially ruptured at 0240  She progressed to complete at 0805 , pushed for 130 min, and delivered a healthy  at 80  Description of Delivery:   With  the assistance of maternal expulsive forces, the fetal vertex delivered spontaneously  A nuchal cord was not noted  A shoulder dystocia was noted with the anterior left shoulder at 1018  Tran maneuver was done followed quickly by suprapubic pressures and the left shoulder was delivered atraumatically with gentle downward traction   The contralateral arm was delivered with gentle upward traction  The remainder of the fetus delivered spontaneously at 80, resulting in a viable female   Upon delivery, the infant was placed on the mothers abdomen and the cord was clamped and cut after 30 seconds  The  was noted to have good tone and cry spontaneously  There was no evidence of injury  The  was passed off to  staff for evaluation  Umbilical cord blood and umbilical artery and venous gases were collected and sent to the lab  An intact placenta was delivered spontaneously at 1024 using fundal massage and gentle cord traction and was noted to have a centrally-inserted 3-vessel cord  Active management of the third stage of labor was undertaken with IV pitocin at 250milliunits/min  Inspection of the perineum, vagina, labia, cervix, and urethra revealed a 1st degree and vaginal laceration  Bleeding was noted to not be under control  A bimanual exam was performed and the uterus was noted to be boggy  Pitocin was run wide open  Bimanual massage was continued and clots were expressed  At 1029 methergine 0 2mg was given in the right thigh  Bleeding was still noted and 800mg of cytotec was given at 1037  Bleeding continued and TXA was given at 1043 along with another bag of Pitocin at 1044  Bleeding was then noted to be under control 2 minutes after TXA was given   Outcome:  Living  with APGARS 9 (1 min) and 9 (5 min)   weight: pending    Perineal Inspection/Laceration Repair  Inspection of the perineum, vagina, labia, cervix, and urethra revealed a 1st degree and vaginal laceration, which was repaired in standard fashion with 3-0 Vicryl rapide  Patient was comfortable with epidural at that time  The laceration showed good tissue reapproximation and hemostasis  At the conclusion of the delivery, all needle, sponge, and instrument counts were noted to be correct   Patient tolerated the procedure well and was allowed to recover in labor and delivery room with family and  before being transferred to the post-partum floor  Conclusion:  Mother and baby are currently recovering nicely in stable condition  Attending Supervision:   Dr Awilda Fowler was present for the entire procedure      Pradeep Irvin MD  PGY-1 OB/GYN   2019 2:01 PM

## 2019-08-18 NOTE — OB LABOR/OXYTOCIN SAFETY PROGRESS
Oxytocin Safety Progress Check Note - Tersandra Coffee 28 y o  female MRN: [id number]    Unit/Bed#: -01 Encounter: [id number]    OB History    Para Term  AB Living   2 0 0 0 1 0   SAB TAB Ectopic Multiple Live Births   1 0 0 0 0     Gestational Age: 39w5d  Dose (michelle-units/min) Oxytocin: 6 michelle-units/min  Contraction Frequency (minutes): 2-3  Contraction Quality: Moderate, Strong  Tachysystole: No   Dilation: 5-6        Effacement (%): 90  Station: -2  Baseline Rate: 145 bpm  Fetal Heart Rate: 136 BPM  FHR Category: Category I          Notes/comments:   She would like the epidural now   Pt making good cervical change to 5 5cm     FHT: category I, ctx q2-3min  Will recheck pt in 2 hours, sooner if patient uncomfortable            Sherly Finch MD 2019 12:12 AM

## 2019-08-18 NOTE — ANESTHESIA PROCEDURE NOTES
Epidural Block    Patient location during procedure: OB  Start time: 8/18/2019 12:34 AM  Reason for block: procedure for pain and at surgeon's request  Staffing  Anesthesiologist: Oliva Armenta MD  Performed: anesthesiologist   Preanesthetic Checklist  Completed: patient identified, site marked, surgical consent, pre-op evaluation, timeout performed, IV checked, risks and benefits discussed and monitors and equipment checked  Epidural  Patient position: sitting  Prep: Betadine  Patient monitoring: frequent blood pressure checks and continuous pulse ox  Approach: midline  Location: lumbar (1-5)  Injection technique: JAMILAH air  Needle  Needle type: Tuohy   Needle gauge: 18 G  Catheter type: side hole  Catheter size: 20 G  Catheter at skin depth: 11 cm  Test dose: negativelidocaine 1 5% with epinephrine 1:200,000 test dose, 5 mL  ropivacaine (NAROPIN) 0 2% epidural injection, 6 mL  Assessment  Sensory level: [id number] aspiration for CSF, negative aspiration for heme and no paresthesia on injection  patient tolerated the procedure well with no immediate complications

## 2019-08-18 NOTE — OB LABOR/OXYTOCIN SAFETY PROGRESS
Oxytocin Safety Progress Check Note - kIe Ala 28 y o  female MRN: [id number]    Unit/Bed#: -01 Encounter: [id number]    OB History    Para Term  AB Living   2 0 0 0 1 0   SAB TAB Ectopic Multiple Live Births   1 0 0 0 0     Gestational Age: 39w5d  Dose (michelle-units/min) Oxytocin: 10 michelle-units/min  Contraction Frequency (minutes): 1 5-3  Contraction Quality: Strong  Tachysystole: No   Dilation: 5-6        Effacement (%): 90  Station: -2  Baseline Rate: 125 bpm  Fetal Heart Rate: 136 BPM  FHR Category: Category I          Notes/comments:   Pt comfortable with epidural    AROM at this check,  with moderate meconium  Still in 5 5     FHT: category I, ctx q2-3min  Will recheck pt in 2 hours, sooner if patient uncomfortable          Nievesbarbra Suarez MD 2019 2:52 AM

## 2019-08-19 LAB
ABO GROUP BLD: NORMAL
BLD GP AB SCN SERPL QL: NEGATIVE
ERYTHROCYTE [DISTWIDTH] IN BLOOD BY AUTOMATED COUNT: 14.4 % (ref 11.6–15.1)
FETAL CELL SCN BLD QL ROSETTE: NEGATIVE
HCT VFR BLD AUTO: 28.9 % (ref 34.8–46.1)
HGB BLD-MCNC: 9.4 G/DL (ref 11.5–15.4)
MCH RBC QN AUTO: 29.5 PG (ref 26.8–34.3)
MCHC RBC AUTO-ENTMCNC: 32.5 G/DL (ref 31.4–37.4)
MCV RBC AUTO: 91 FL (ref 82–98)
PLATELET # BLD AUTO: 224 THOUSANDS/UL (ref 149–390)
PMV BLD AUTO: 10.4 FL (ref 8.9–12.7)
RBC # BLD AUTO: 3.19 MILLION/UL (ref 3.81–5.12)
RH BLD: NEGATIVE
RPR SER QL: NORMAL
WBC # BLD AUTO: 14.73 THOUSAND/UL (ref 4.31–10.16)

## 2019-08-19 PROCEDURE — 3E0234Z INTRODUCTION OF SERUM, TOXOID AND VACCINE INTO MUSCLE, PERCUTANEOUS APPROACH: ICD-10-PCS | Performed by: OBSTETRICS & GYNECOLOGY

## 2019-08-19 PROCEDURE — 86901 BLOOD TYPING SEROLOGIC RH(D): CPT | Performed by: OBSTETRICS & GYNECOLOGY

## 2019-08-19 PROCEDURE — 85027 COMPLETE CBC AUTOMATED: CPT | Performed by: OBSTETRICS & GYNECOLOGY

## 2019-08-19 PROCEDURE — 86850 RBC ANTIBODY SCREEN: CPT | Performed by: OBSTETRICS & GYNECOLOGY

## 2019-08-19 PROCEDURE — 86900 BLOOD TYPING SEROLOGIC ABO: CPT | Performed by: OBSTETRICS & GYNECOLOGY

## 2019-08-19 PROCEDURE — 85461 HEMOGLOBIN FETAL: CPT | Performed by: OBSTETRICS & GYNECOLOGY

## 2019-08-19 PROCEDURE — 99024 POSTOP FOLLOW-UP VISIT: CPT | Performed by: OBSTETRICS & GYNECOLOGY

## 2019-08-19 RX ADMIN — IBUPROFEN 600 MG: 600 TABLET ORAL at 15:01

## 2019-08-19 RX ADMIN — HUMAN RHO(D) IMMUNE GLOBULIN 300 MCG: 300 INJECTION, SOLUTION INTRAMUSCULAR at 15:53

## 2019-08-19 RX ADMIN — IBUPROFEN 600 MG: 600 TABLET ORAL at 07:44

## 2019-08-19 RX ADMIN — DOCUSATE SODIUM 100 MG: 100 CAPSULE, LIQUID FILLED ORAL at 18:00

## 2019-08-19 RX ADMIN — DOCUSATE SODIUM 100 MG: 100 CAPSULE, LIQUID FILLED ORAL at 08:28

## 2019-08-19 NOTE — PLAN OF CARE
Problem: PAIN - ADULT  Goal: Verbalizes/displays adequate comfort level or baseline comfort level  Description  Interventions:  - Encourage patient to monitor pain and request assistance  - Assess pain using appropriate pain scale  - Administer analgesics based on type and severity of pain and evaluate response  - Implement non-pharmacological measures as appropriate and evaluate response  - Consider cultural and social influences on pain and pain management  - Notify physician/advanced practitioner if interventions unsuccessful or patient reports new pain  Outcome: Progressing     Problem: INFECTION - ADULT  Goal: Absence or prevention of progression during hospitalization  Description  INTERVENTIONS:  - Assess and monitor for signs and symptoms of infection  - Monitor lab/diagnostic results  - Monitor all insertion sites, i e  indwelling lines, tubes, and drains  - Monitor endotracheal if appropriate and nasal secretions for changes in amount and color  - [place] appropriate cooling/warming therapies per order  - Administer medications as ordered  - Instruct and encourage patient and family to use good hand hygiene technique  - Identify and instruct in appropriate isolation precautions for identified infection/condition  Outcome: Progressing  Goal: Absence of fever/infection during neutropenic period  Description  INTERVENTIONS:  - Monitor WBC    Outcome: Progressing     Problem: SAFETY ADULT  Goal: Patient will remain free of falls  Description  INTERVENTIONS:  - Assess patient frequently for physical needs  -  Identify cognitive and physical deficits and behaviors that affect risk of falls    -  [place] fall precautions as indicated by assessment   - Educate patient/family on patient safety including physical limitations  - Instruct patient to call for assistance with activity based on assessment  - Modify environment to reduce risk of injury  - Consider OT/PT consult to assist with strengthening/mobility  Outcome: Progressing  Goal: Maintain or return to baseline ADL function  Description  INTERVENTIONS:  -  Assess patient's ability to carry out ADLs; assess patient's baseline for ADL function and identify physical deficits which impact ability to perform ADLs (bathing, care of mouth/teeth, toileting, grooming, dressing, etc )  - Assess/evaluate cause of self-care deficits   - Assess range of motion  - Assess patient's mobility; develop plan if impaired  - Assess patient's need for assistive devices and provide as appropriate  - Encourage maximum independence but intervene and supervise when necessary  - Involve family in performance of ADLs  - Assess for home care needs following discharge   - Consider OT consult to assist with ADL evaluation and planning for discharge  - Provide patient education as appropriate  Outcome: Progressing  Goal: Maintain or return mobility status to optimal level  Description  INTERVENTIONS:  - Assess patient's baseline mobility status (ambulation, transfers, stairs, etc )    - Identify cognitive and physical deficits and behaviors that affect mobility  - Identify mobility aids required to assist with transfers and/or ambulation (gait belt, sit-to-stand, lift, walker, cane, etc )  - [place] fall precautions as indicated by assessment  - Record patient progress and toleration of activity level on Mobility SBAR; progress patient to next Phase/Stage  - Instruct patient to call for assistance with activity based on assessment  - Consider rehabilitation consult to assist with strengthening/weightbearing, etc   Outcome: Progressing     Problem: DISCHARGE PLANNING  Goal: Discharge to home or other facility with appropriate resources  Description  INTERVENTIONS:  - Identify barriers to discharge w/patient and caregiver  - Arrange for needed discharge resources and transportation as appropriate  - Identify discharge learning needs (meds, wound care, etc )  - Arrange for interpretive services to assist at discharge as needed  - Refer to Case Management Department for coordinating discharge planning if the patient needs post-hospital services based on physician/advanced practitioner order or complex needs related to functional status, cognitive ability, or social support system  Outcome: Progressing     Problem: POSTPARTUM  Goal: Experiences normal postpartum course  Description  INTERVENTIONS:  - Monitor maternal vital signs  - Assess uterine involution and lochia  Outcome: Progressing  Goal: Appropriate maternal -  bonding  Description  INTERVENTIONS:  - Identify family support  - Assess for appropriate maternal/infant bonding   -Encourage maternal/infant bonding opportunities  - Referral to  or  as needed  Outcome: Progressing  Goal: Establishment of infant feeding pattern  Description  INTERVENTIONS:  - Assess breast/bottle feeding  - Refer to lactation as needed  Outcome: Progressing  Goal: Incision(s), wounds(s) or drain site(s) healing without S/S of infection  Description  INTERVENTIONS  - Assess and document risk factors for skin impairment   - Assess and document dressing, incision, wound bed, drain sites and surrounding tissue  - Provide patient/ family education   Outcome: Progressing

## 2019-08-19 NOTE — PROGRESS NOTES
Progress Note - OB/GYN   Dawit Dillon 28 y o  female MRN: [id number]  Unit/Bed#: -01 Encounter: [id number]    Assessment:  Post partum Day #1 s/p , stable, baby in nursery    Plan:  1) Continue routine post partum care   Encourage ambulation   Encourage breastfeeding   Anticipate discharge tomorrow     Subjective/Objective   Chief Complaint:     Post delivery  Patient is doing well  Lochia WNL  Pain well controlled  Subjective:     Pain: yes, cramping, improved with meds  Tolerating PO: yes  Voiding: yes  Flatus: yes  BM: no  Ambulating: yes  Breastfeeding:  yes  Chest pain: no  Shortness of breath: no  Leg pain: no  Lochia: minimal    Objective:     Vitals: /66 (BP Location: Right arm)   Pulse 88   Temp 97 6 °F (36 4 °C) (Oral)   Resp 18   Ht 5' 2" (1 575 m)   Wt 76 2 kg (168 lb)   LMP 2018   SpO2 97%   Breastfeeding? Yes   BMI 30 73 kg/m²       Intake/Output Summary (Last 24 hours) at 2019 0633  Last data filed at 2019 1608  Gross per 24 hour   Intake --   Output 2926 ml   Net -2926 ml       Lab Results   Component Value Date    WBC 13 31 (H) 2019    HGB 13 8 2019    HCT 41 4 2019    MCV 90 2019     2019       Physical Exam:     Gen: AAOx3, NAD  CV: RRR  Lungs: CTA b/l  Abd: Soft, non-tender, non-distended, no rebound or guarding  Uterine fundus firm and non-tender, 1 cm below the umbilicus     Ext: Non tender    Nelson Ochoa MD  2019  6:33 AM

## 2019-08-19 NOTE — UTILIZATION REVIEW
Notification of Maternity Inpatient Admission/Maternity Inpatient Authorization Request  This is a Notification of Maternity Inpatient Admission/Maternity Inpatient Authorization Request to our facility [facility] [address]  Please be advised that this patient is currently in our facility under Inpatient Status  Below you will find the Birth/[place] Summary, Attending Physician and Facilitys information including NPI#  and contact information for the Utilization Review Department where the patient is receiving care services  Facility: [facility] [address]  Address: [address] [place] [address]  Phone: [phone] Tax ID: [id number]  NPI: [id number]  MEDICARE ID: [id number]    Place of Service Code: 24   Place of Service Name: Inpatient Hospital  Presentation Date & Time: 2019  6:02 PM  Inpatient Admission Date & Time: 19 1480  Discharge Date & Time: No discharge date for patient encounter  Discharge Disposition (if discharged): Home/Self Care  Attending Physician & NPI: Alexandro Phelps [[id number]]  Attending Physician:  ELIZABETH Edwarsd  Specialty- Obstetrics and Gynecology  [facility] ID- [id number]  [address]  Phone 1: [phone]  Fax: [phone]  Mother of  Information: Leonel Garnica   MRN: [id number] YOB: 1985   Mother's Admitting Diagnosis: 44 weeks gestation of pregnancy [Z3A 39]  Estimated Date of Delivery: 19  Type of Delivery: Vaginal, Spontaneous    Delivering clinician: Kyleigh Rodriguez   OB History        2    Para   1    Term   1       0    AB   1    Living   1       SAB   1    TAB   0    Ectopic   0    Multiple   0    Live Births   1                Name & MRN:   Information for the patient's :  Ann Marie Sims Girl Kathy Rodriguez) [[id number]]      Delivery Information:  Sex: female  Delivered 2019 10:19 AM by Vaginal, Spontaneous; Gestational Age: 38w11d    Price Measurements:  Weight: 8 lb 3 oz (3714 g); Height: 19"    APGAR 1 minute 5 minutes 10 minutes   Totals: 9 9      Thank you,  [address]  Utilization Review Department  Phone: [phone]; Fax [phone]  ATTENTION: Please call with any questions or concerns to [phone]  and carefully follow the prompts so that you are directed to the right person  Send all requests for admission clinical reviews, approved or denied determinations and any other requests to fax [phone]   All voicemails are confidential

## 2019-08-19 NOTE — DISCHARGE INSTR - ACTIVITY
For a 10 minute 37 second long video on [employer] you may watch about optimum latching and positioning, you may look up key words on Google:     Keywords:   Global  attaching your   Health  Baby  at    Media  the   Breast     Or follow the link below:  [email or web address]

## 2019-08-20 VITALS
SYSTOLIC BLOOD PRESSURE: 114 MMHG | HEART RATE: 83 BPM | RESPIRATION RATE: 20 BRPM | OXYGEN SATURATION: 96 % | BODY MASS INDEX: 30.91 KG/M2 | TEMPERATURE: 98.1 F | WEIGHT: 168 LBS | HEIGHT: 62 IN | DIASTOLIC BLOOD PRESSURE: 63 MMHG

## 2019-08-20 PROCEDURE — 99024 POSTOP FOLLOW-UP VISIT: CPT | Performed by: OBSTETRICS & GYNECOLOGY

## 2019-08-20 RX ORDER — DIAPER,BRIEF,INFANT-TODD,DISP
1 EACH MISCELLANEOUS AS NEEDED
Qty: 30 G | Refills: 0 | Status: CANCELLED | OUTPATIENT
Start: 2019-08-20

## 2019-08-20 RX ORDER — IBUPROFEN 600 MG/1
600 TABLET ORAL EVERY 6 HOURS PRN
Qty: 30 TABLET | Refills: 2 | Status: SHIPPED | OUTPATIENT
Start: 2019-08-20 | End: 2021-07-28

## 2019-08-20 RX ORDER — DOCUSATE SODIUM 100 MG/1
100 CAPSULE, LIQUID FILLED ORAL 2 TIMES DAILY
Qty: 10 CAPSULE | Refills: 0 | Status: CANCELLED
Start: 2019-08-20

## 2019-08-20 RX ORDER — ACETAMINOPHEN 325 MG/1
650 TABLET ORAL EVERY 4 HOURS PRN
Qty: 30 TABLET | Refills: 0 | Status: CANCELLED
Start: 2019-08-20

## 2019-08-20 RX ADMIN — IBUPROFEN 600 MG: 600 TABLET ORAL at 06:56

## 2019-08-20 NOTE — LACTATION NOTE
This note was copied from a baby's chart  Makilesly Bazan reports that breast feeding is going well  Infant is at 3 8% weight loss  Encouraged offering both breasts at a feeding and offering feeding more frequently based on feeding patterns observed  Offered to assist with scheduling at [facility]  Glynn Bazan declined at this time  Contact information provided  Glynn Bazan is independently positioning and latching infant with 10/10 LATCH    Met with mother to go over feeding log since birth for the first week  Emphasized 8 or more (12) feedings in a 24 hour period, what to expect for the number of diapers per day of life and the progression of properties of the  stooling pattern  Discussed s/s that breastfeeding is going well after day 4 and when to get help from a pediatrician or lactation support person after day 4  Booklet included Breast Pumping Instructions, When You Go Back to Work or School, and Breastfeeding Resources for after discharge including access to the number for the [employer]  Discussed s/s engorgement and how to manage with medications and cool compresses as well as s/s mastitis and when to contact physician  Encouraged parents to call for assistance, questions, and concerns about breastfeeding  Extension provided

## 2019-08-20 NOTE — PLAN OF CARE
Problem: PAIN - ADULT  Goal: Verbalizes/displays adequate comfort level or baseline comfort level  Description  Interventions:  - Encourage patient to monitor pain and request assistance  - Assess pain using appropriate pain scale  - Administer analgesics based on type and severity of pain and evaluate response  - Implement non-pharmacological measures as appropriate and evaluate response  - Consider cultural and social influences on pain and pain management  - Notify physician/advanced practitioner if interventions unsuccessful or patient reports new pain  Outcome: Progressing     Problem: INFECTION - ADULT  Goal: Absence or prevention of progression during hospitalization  Description  INTERVENTIONS:  - Assess and monitor for signs and symptoms of infection  - Monitor lab/diagnostic results  - Monitor all insertion sites, i e  indwelling lines, tubes, and drains  - Monitor endotracheal if appropriate and nasal secretions for changes in amount and color  - [place] appropriate cooling/warming therapies per order  - Administer medications as ordered  - Instruct and encourage patient and family to use good hand hygiene technique  - Identify and instruct in appropriate isolation precautions for identified infection/condition  Outcome: Progressing  Goal: Absence of fever/infection during neutropenic period  Description  INTERVENTIONS:  - Monitor WBC    Outcome: Progressing     Problem: SAFETY ADULT  Goal: Patient will remain free of falls  Description  INTERVENTIONS:  - Assess patient frequently for physical needs  -  Identify cognitive and physical deficits and behaviors that affect risk of falls    -  [place] fall precautions as indicated by assessment   - Educate patient/family on patient safety including physical limitations  - Instruct patient to call for assistance with activity based on assessment  - Modify environment to reduce risk of injury  - Consider OT/PT consult to assist with strengthening/mobility  Outcome: Progressing  Goal: Maintain or return to baseline ADL function  Description  INTERVENTIONS:  -  Assess patient's ability to carry out ADLs; assess patient's baseline for ADL function and identify physical deficits which impact ability to perform ADLs (bathing, care of mouth/teeth, toileting, grooming, dressing, etc )  - Assess/evaluate cause of self-care deficits   - Assess range of motion  - Assess patient's mobility; develop plan if impaired  - Assess patient's need for assistive devices and provide as appropriate  - Encourage maximum independence but intervene and supervise when necessary  - Involve family in performance of ADLs  - Assess for home care needs following discharge   - Consider OT consult to assist with ADL evaluation and planning for discharge  - Provide patient education as appropriate  Outcome: Progressing  Goal: Maintain or return mobility status to optimal level  Description  INTERVENTIONS:  - Assess patient's baseline mobility status (ambulation, transfers, stairs, etc )    - Identify cognitive and physical deficits and behaviors that affect mobility  - Identify mobility aids required to assist with transfers and/or ambulation (gait belt, sit-to-stand, lift, walker, cane, etc )  - [place] fall precautions as indicated by assessment  - Record patient progress and toleration of activity level on Mobility SBAR; progress patient to next Phase/Stage  - Instruct patient to call for assistance with activity based on assessment  - Consider rehabilitation consult to assist with strengthening/weightbearing, etc   Outcome: Progressing     Problem: DISCHARGE PLANNING  Goal: Discharge to home or other facility with appropriate resources  Description  INTERVENTIONS:  - Identify barriers to discharge w/patient and caregiver  - Arrange for needed discharge resources and transportation as appropriate  - Identify discharge learning needs (meds, wound care, etc )  - Arrange for interpretive services to assist at discharge as needed  - Refer to Case Management Department for coordinating discharge planning if the patient needs post-hospital services based on physician/advanced practitioner order or complex needs related to functional status, cognitive ability, or social support system  Outcome: Progressing     Problem: POSTPARTUM  Goal: Experiences normal postpartum course  Description  INTERVENTIONS:  - Monitor maternal vital signs  - Assess uterine involution and lochia  Outcome: Progressing  Goal: Appropriate maternal -  bonding  Description  INTERVENTIONS:  - Identify family support  - Assess for appropriate maternal/infant bonding   -Encourage maternal/infant bonding opportunities  - Referral to  or  as needed  Outcome: Progressing  Goal: Establishment of infant feeding pattern  Description  INTERVENTIONS:  - Assess breast/bottle feeding  - Refer to lactation as needed  Outcome: Progressing  Goal: Incision(s), wounds(s) or drain site(s) healing without S/S of infection  Description  INTERVENTIONS  - Assess and document risk factors for skin impairment   - Assess and document dressing, incision, wound bed, drain sites and surrounding tissue  - Provide patient/ family education   Outcome: Progressing

## 2019-08-20 NOTE — DISCHARGE INSTRUCTIONS
Vaginal Delivery   AMBULATORY CARE:   What you need to know about vaginal delivery:  A vaginal delivery occurs when your baby is born through your vagina (birth canal)  How to prepare for vaginal delivery: You will not be able to eat while you are in active labor  Your healthcare provider can give you medicines for pain relief if you chose to have them  You may need medicine to induce (start) your labor if your labor is not moving forward  You may need to move in bed, stand, or walk to help your baby move into position for birth  What will happen during vaginal delivery:   · You can move into several positions during delivery  You can lie on your back, have your feet up in stirrups, or squat  You may feel pressure on your rectum  This pressure is caused by the movement of your baby's head down the birth canal  You may feel the urge to push  Your healthcare provider will tell you when to push, and guide your baby out of the birth canal  Healthcare providers may use forceps or suction to help deliver your baby  You may also need an episiotomy (incision) to make the vaginal opening larger  This will make more room for your baby  · After your baby is born, your healthcare provider will put clamps on the cord that connects your baby to the placenta  The cord is then cut  Your uterus continues to contract after delivery to push out the placenta  Your healthcare provider may close your episiotomy incision or any tears with stitches  What will happen after vaginal delivery:   · Healthcare providers will examine your baby  You may be able to hold your baby soon after he or she is born  After healthcare providers have checked that you and your baby are okay, you may be taken to another room  · A healthcare provider may massage your abdomen several times to make your uterus firm  This can be uncomfortable   You may have abdominal pains for up to 3 days after you give birth because your uterus is still leeanna  The contractions help release blood from inside your uterus so it shrinks back to its normal size  These contractions may hurt more while you breastfeed your baby  · Your healthcare provider may suggest you get out of bed to sit in a chair or walk  Activity can help prevent blood clots  · You may be able to go home within 24 to 48 hours after delivery  If you need support at home, ask your healthcare provider about home visits by another healthcare provider  This healthcare provider can help you learn about breastfeeding, bottle feeding, baby care, and perineum care  Follow up with your healthcare provider:  Most women need to return 6 weeks after a vaginal delivery  Ask your healthcare provider how to care for your wounds or stitches, if you have them  Write down your questions so you remember to ask them during your visits  Seek care immediately if:   · Your leg feels warm, tender, and painful  It may look swollen and red  · You have a fever  · You are urinating very little, or not at all  · You have heavy vaginal bleeding that fills 1 or more sanitary pads in 1 hour  · You feel weak, dizzy, or faint  Contact your healthcare provider if:   · Your abdominal or perineal pain does not go away, or gets worse  · You feel depressed  · You have questions or concerns about your condition or care  Medicines:  · NSAIDs , such as ibuprofen, help decrease swelling, pain, and fever  This medicine is available with or without a doctor's order  NSAIDs can cause stomach bleeding or kidney problems in certain people  If you take blood thinner medicine, always ask your healthcare provider if NSAIDs are safe for you  Always read the medicine label and follow directions  · Stool softeners  make it easier for you to have a bowel movement  You may need this medicine to treat or prevent constipation  · Take your medicine as directed    Contact your healthcare provider if you think your medicine is not helping or if you have side effects  Tell him or her if you are allergic to any medicine  Keep a list of the medicines, vitamins, and herbs you take  Include the amounts, and when and why you take them  Bring the list or the pill bottles to follow-up visits  Carry your medicine list with you in case of an emergency  Activity:  Rest as much as possible  Try to keep all activities short  You may be able to do some exercise soon after you have your baby  Talk with your healthcare provider before you start exercising  If you work outside the home, ask when you can return to your job  Kegel exercises:  Kegel exercises may help your vaginal and rectal muscles heal faster  You can do Kegel exercises by tightening and relaxing the muscles around your vagina  Kegel exercises help make the muscles stronger  Breast care:  When your milk comes in, your breasts may feel full and hard  Ask how to care for your breasts, even if you are not breastfeeding  Constipation:  You may have constipation for a period of time after you have your baby  Do not try to push the bowel movement out if it is too hard  High-fiber foods and extra liquids can help you prevent constipation  Examples of high-fiber foods are fruit and bran  Prune juice and water are good liquids to drink  You may also be told to take over-the-counter fiber and stool softener medicines  Take these items as directed  Ask how to prevent or treat hemorrhoids  Perineum care: Your perineum is the area between your vagina and anus  Keep the area clean and dry  This will help it heal and prevent infection  Wash the area gently with soap and water when you bathe or shower  Rinse your perineum with warm water after you urinate or have a bowel movement  Your healthcare provider may suggest you use a warm sitz bath to help decrease pain  To take a sitz bath, fill a bathtub with 4 to 6 inches of warm water   You may also use a sitz bath pan that fits inside the toilet  Sit in the sitz bath for 20 minutes  Do this 2 to 3 times a day, or as directed  The warm water can help decrease pain and swelling  Vaginal discharge: You will have vaginal discharge, called lochia, after your delivery  The lochia is red or dark brown with clots for 1 to 3 days after the birth  The amount will decrease and turn pale pink or brown for 3 to 10 days  It will turn white or yellow on the 10th or 14th day  Lochia is usually gone within 3 weeks  Use a sanitary pad rather than a tampon to prevent a vaginal infection  You will have lochia for up to 3 weeks after your baby is born  Monthly periods: Your period may start again within 7 to 9 weeks after your baby is born  If you are breastfeeding, it may take longer for your period to start again  You can still get pregnant again even though you do not have your monthly period  Talk with your healthcare provider about a birth control method if you do not want to get pregnant  Mood changes: Many new mothers have some kind of mood changes after delivery  Some of these changes occur because of lack of sleep, hormone changes, and caring for a new baby  Some mood changes can be more serious, such as postpartum depression  Talk with your healthcare provider if you feel unable to care for yourself or your baby  Sexual activity:  Do not have sex until your healthcare provider says it is okay  You may notice you have a decreased desire for sex, or sex may be painful  You may need to use a vaginal lubricant (gel) to help make sex more comfortable  © 2017 [employer]  Information is for End User's use only and may not be sold, redistributed or otherwise used for commercial purposes  All illustrations and images included in CareNotes® are the copyrighted property of [employer] , [employer]  or Yash Dye  The above information is an  only  It is not intended as medical advice for individual conditions or treatments   Talk to your doctor, nurse or pharmacist before following any medical regimen to see if it is safe and effective for you

## 2019-08-20 NOTE — PROGRESS NOTES
Postpartum Progress Note       PROCEDURE: Spontaneous Vaginal Delivery    SUBJECTIVE:    Pain: yes, relieved with Motrin and Tylenol    Tolerating Oral Intake: yes     Voiding: yes    Flatus: yes    Bowel Movement: yes    Ambulating: yes    Breastfeeding: yes    Chest Pain: no    Shortness of Breath: no    Leg Pain/Discomfort: no    Lochia: moderate    Other: Pt says she is feeling good  She has had no f/c, n/v/d, SOB, lightheadedness or palpitations  She would like to be shown how to use her breast pump before she goes home today        OBJECTIVE:    Vitals:    08/19/19 0822 08/19/19 1155 08/19/19 1603 08/20/19 0022   BP: 108/59 114/60 109/70 (!) 101/48   BP Location: Right arm Right arm  Right arm   Pulse: 98 84 94 79   Resp: 18 18 18 16   Temp: 97 8 °F (36 6 °C) 98 2 °F (36 8 °C) 97 6 °F (36 4 °C) 98 5 °F (36 9 °C)   TempSrc: Oral Oral Oral Oral   SpO2:    96%   Weight:       Height:            General: No Acute Distress     Cardiovascular: Regular, Rate and Rhythm, no murmur, gallop or rub     Lungs: Clear to Auscultation Bilaterally, no wheezing, rhonchi or rales     Abdomen: Soft, non-distended, non-tender, no rebound, guarding or CVA tenderness     Fundus: Firm & Non-Tender     Fundal Location:    -1 cm below the umbilicus    Lower Extremities: Non-Tender    LABS / TESTS / MEDICATION:      Admission on 08/17/2019   Component Date Value    ABO Grouping 08/17/2019 O     Rh Factor 08/17/2019 Negative     Antibody Screen 08/17/2019 Negative     Specimen Expiration Date 08/17/2019 [id number]     WBC 08/17/2019 13 31*    RBC 08/17/2019 4 62     Hemoglobin 08/17/2019 13 8     Hematocrit 08/17/2019 41 4     MCV 08/17/2019 90     MCH 08/17/2019 29 9     MCHC 08/17/2019 33 3     RDW 08/17/2019 14 1     Platelets 29/88/2482 256     MPV 08/17/2019 10 9     RPR 08/17/2019 Non-Reactive     POC Glucose 08/18/2019 93     POC Glucose 08/18/2019 92     pH, Cord Emerson 08/18/2019 7 348     pCO2, Cord Emerson 08/18/2019 37 2     pO2, Cord Emerson 2019 28 9     HCO3, Cord Emerson 2019 20 0    [facility], Cord Emerson 2019 -5 0*    O2 Cont, Cord Emerson 2019 14 9     O2 HGB,VENOUS CORD 2019 69 6     POC Glucose 2019 77     POC Glucose 2019 77     Strep Group B Ag 2019 Negative     POC Glucose 2019 82     WBC 2019 14 73*    RBC 2019 3 19*    Hemoglobin 2019 9 4*    Hematocrit 2019 28 9*    MCV 2019 91     MCH 2019 29 5     MCHC 2019 32 5     RDW 2019 14 4     Platelets  224     MPV 2019 10 4     ABO Grouping 2019 O     Rh Factor 2019 Negative     Antibody Screen 2019 Negative     Fetal Bleed Screen 2019 Negative        Current Facility-Administered Medications   Medication Dose Route Frequency    acetaminophen (TYLENOL) tablet 650 mg  650 mg Oral Q4H PRN    benzocaine-menthol-lanolin-aloe (DERMOPLAST) 20-0 5 % topical spray   Topical TID PRN    docusate sodium (COLACE) capsule 100 mg  100 mg Oral BID    hydrocortisone 1 % cream 1 application  1 application Topical PRN    ibuprofen (MOTRIN) tablet 600 mg  600 mg Oral Q4H PRN    ondansetron (ZOFRAN) injection 4 mg  4 mg Intravenous Q8H PRN    oxytocin (PITOCIN) 30 Units in lactated ringers 500 mL infusion  1-30 michelle-units/min Intravenous Titrated    ropivacaine 0 2% PCEA   Epidural Continuous    witch hazel-glycerin (TUCKS) topical pad 1 pad  1 pad Topical PRN       ASSESSMENT AND PLAN:   Postpartum day # 2   Status post:   1  Continue Routine Postpartum Care  2  Encourage Ambulation  3  Advance diet as tolerated  4  Patient is stable to go home      Signature/Title: Sofia Goodson MD  Date: 2019  Time: 6:27 AM

## 2019-08-21 NOTE — UTILIZATION REVIEW
Discharge Summary - OB/GYN   Mallika Clements 28 y o  female MRN: [id number]  Unit/Bed#: -01 Encounter: [id number]        Admission Date: 2019      Discharge Date: 2019     Admitting Diagnosis:   1  Pregnancy at 39w5d     Discharge Diagnosis:   1  Same, delivered  2  Post partum hemorrhage        Procedures: spontaneous vaginal delivery     Attending: Maggie Bernabe MD     Hospital Course:      Mallika Clements is a 28 y o   at 39w5d wks who was initially admitted for labor       She delivered a viable female  on 19 at 80  Weight 8lbs 3oz via spontaneous vaginal delivery  Apgars were 9 (1 min) and 9 (5 min)   was transferred to the  nursery  Patient tolerated the procedure well and was transferred to recovery in stable condition       Her post-partum course was complicated by post partum hemorrhage with blood loss of 1626  Pitocin was ran with open lines  Bimanual massage was performed, Methergine 0 2mg was given, and Cytotec was given in the preceding order  Control of bleeding was gained 2 minutes after giving TXA with another bag of Pitocin  She had a 1st degree vaginal laceration  It was repaired with good alignment and hemostasis  Her post-partum pain was well controlled with oral analgesics      On day of discharge, she was ambulating and able to reasonably perform all ADLs  She was voiding and had appropriate bowel function  Pain was well controlled  She was discharged home on post-partum day #2 without complications   Patient was instructed to follow up with her OB as an outpatient and was given appropriate warnings to call provider if she develops signs of infection or uncontrolled pain      Complications: none apparent     Condition at discharge: stable      Discharge instructions/Information to patient and family:   See after visit summary for information provided to patient and family        Provisions for Follow-Up Care:  See after visit summary for information related to follow-up care and any pertinent home health orders        Disposition: Home     Planned Readmission: No     Discharge Medications:   For a complete list of the patient's medications, please refer to her med rec

## 2019-08-27 ENCOUNTER — OFFICE VISIT (OUTPATIENT)
Dept: POSTPARTUM | Facility: CLINIC | Age: 34
End: 2019-08-27
Payer: COMMERCIAL

## 2019-08-27 VITALS — DIASTOLIC BLOOD PRESSURE: 76 MMHG | SYSTOLIC BLOOD PRESSURE: 120 MMHG

## 2019-08-27 DIAGNOSIS — Z71.89 ENCOUNTER FOR BREAST FEEDING COUNSELING: Primary | ICD-10-CM

## 2019-08-27 PROCEDURE — 99404 PREV MED CNSL INDIV APPRX 60: CPT | Performed by: PEDIATRICS

## 2019-08-27 NOTE — PATIENT INSTRUCTIONS
Continue to feed at the breast on demand  In a few weeks, if you choose to pump your milk for bottle feeding, Cycle your pump through stimulation and expression mode several times in a session to stimulate several let downs  Use hands on pumping and hand expression to increase your output  Maintain your pump as recommended  Pumping too frequently in addition to nursing on demand may increase supply too much  Occasional pumping or pumping whenever a feeding at the breast is missed should be enough  When feeding your expressed milk, use paced bottle feeding  This method is less stressful for your baby, prevents overfeeding and protects the breastfeeding relationship  Please call with any questions or concerns

## 2019-08-27 NOTE — PROGRESS NOTES
INITIAL BREAST FEEDING EVALUATION    Informant/Relationship: Pratik Urban and her , Kevon Oneill    Discussion of General Lactation Issues: Pratik Urban has some nipple discomfort but otherwise feels breastfeeding is going well  Initially latch was very uncomfortable  She has questions about why [address] for such long periods of time  She also has questions about pumping  Infant is 5days old today   History:  Fertility Problem:yes - 5 years of attempts and one miscarriage prior to this pregnancy  No testing or treatment  Breast changes:yes - Nipples and areola got larger and darker, breasts got ray  : yes - labor augmented with pitocin due to FTP  Full term:yes - 39 5/7 weeks   labor:no  First nursing/attempt < 1 hour after birth:yes - baby was able to latch  Skin to skin following delivery:yes - until after the first feeding    Breast changes after delivery:yes - milk came in on day 4  Rooming in (infant in room with mother with exception of procedures, eg  Circumcision: No went to the nursery one night  Blood sugar issues:no  NICU stay:no  Jaundice:no  Phototherapy:no  Supplement given: (list supplement and method used as well as reason(s):no    Past Medical History:   Diagnosis Date    Abnormal Pap smear of cervix     GERD (gastroesophageal reflux disease)     Helicobacter pylori (H  pylori) infection     Varicella     as child         Current Outpatient Medications:     B Complex Vitamins (B COMPLEX-B12 PO), Take by mouth daily, Disp: , Rfl:     Calcium-Magnesium-Vitamin D 185- MG-MG-UNIT CAPS, Take by mouth, Disp: , Rfl:     Cholecalciferol (VITAMIN D3) 2000 units capsule, Take 2,000 Units by mouth daily, Disp: , Rfl:     Docosahexaenoic Acid (DHA OMEGA 3 PO), Take by mouth, Disp: , Rfl:     ibuprofen (MOTRIN) 600 mg tablet, Take 1 tablet (600 mg total) by mouth every 6 (six) hours as needed for mild pain or moderate pain (cramping), Disp: 30 tablet, Rfl: 2   IODINE, KELP, PO, Take by mouth, Disp: , Rfl:     Prenatal Multivit-Min-Fe-FA (PRENATAL/IRON) TABS, Take by mouth, Disp: , Rfl:     No Known Allergies    Social History     Substance and Sexual Activity   Drug Use No       Social History Former smoker    Interval Breastfeeding History:    Frequency of breast feeding: Every 1 5-3 hours  Parents will wake her up at 3 hours if she does not wake herself  Does mother feel breastfeeding is effective: Yes  Does infant appear satisfied after nursing:Yes  Stooling pattern normal: Yes  Urinating frequently:Yes  Using shield or shells: No    Alternative/Artificial Feedings:   Bottle: No  Cup: No  Syringe/Finger: No           Formula Type: none                     Amount: n/a            Breast Milk:                      Amount: none            Frequency Q 1 5-3 Hr between feedings  Elimination Problems: No      Equipment:  Nipple Shield             Type: none             Size: n/a             Frequency of Use: n/a  Pump            Type: Spectra S2            Frequency of Use: None currently  Shells            Type: none            Frequency of use: n/a    Equipment Problems: no    Mom:  Breast: Large pendulous symmetrical breasts  Nipple Assessment in General: Normal: elongated/eraser, no discoloration and no damage noted  Tiny scab on the face of the right nipple  Mother's Awareness of Feeding Cues                 Recognizes: Yes                  Verbalizes: Yes  Support System: FOB, extended family  History of Breastfeeding: none  Changes/Stressors/Violence: Ronnie Diaz is concerned that David Padilla may not be getting everything she needs from the breast   Concerns/Goals: Ronnie Diaz would like to [facility] for an extended period  Problems with Mom: None    Physical Exam   Constitutional: She is oriented to person, place, and time  She appears well-developed and well-nourished  HENT:   Head: Normocephalic and atraumatic  Neck: Normal range of motion  Neck supple     Cardiovascular: Normal rate, regular rhythm, normal heart sounds and intact distal pulses  Pulmonary/Chest: Effort normal and breath sounds normal    Musculoskeletal: Normal range of motion  She exhibits no edema  Neurological: She is alert and oriented to person, place, and time  Skin: Skin is warm and dry  Psychiatric: She has a normal mood and affect  Her behavior is normal  Judgment and thought content normal        Infant:  Behaviors: Alert  Color: Pink  Birth weight: 3714gram  Current weight: 3715gram    Problems with infant: None      General Appearance:  Alert, active, no distress                            Head:  Normocephalic, AFOF, sutures opposed                            Eyes:   Conjunctiva clear, no drainage                            Ears:   Normally placed, no anomolies                           Nose:   no drainage or erythema                          Mouth:  No lesions  High palate  Tongue extends beyond the lower lip, lateralizes well and tip elevates to mid mouth  Complete cupping of my finger while sucking with peristalsis originating at the tip of the tongue  Neck:  Supple, symmetrical, trachea midline                Respiratory:  No grunting, flaring, retractions, breath sounds clear and equal           Cardiovascular:  Regular rate and rhythm  No murmur  Adequate perfusion/capillary refill  Femoral pulse present                  Abdomen:    Soft, non-tender, no masses, bowel sounds present, no HSM            Genitourinary:  Normal female genitalia, anus patent                         Spine:   No abnormalities noted       Musculoskeletal:   Full range of motion         Skin/Hair/Nails:   Skin warm, dry, and intact, no rashes or abnormal dyspigmentation or lesions               Neurologic:   No abnormal movement, tone appropriate for gestational age    [place] Latch:  Efficiency:               Lips Flanged: Yes              Depth of latch: wide              Audible Swallow:  Yes Visible Milk: Yes              Wide Open/ Asymmetrical: Yes              Suck Swallow Cycle: Breathing: unlabored, Coordinated: yes  Nipple Assessment after latch: Normal: elongated/eraser, no discoloration and no damage noted  Latch Problems: None  Roosevelt Figueroa needed no assistance achieving an effective, comfortable latch without difficulty  She nursed effectively until she was content  Position:  Infant's Ergonomics/Body               Body Alignment: Yes               Head Supported: Yes               Close to Mom's body/ Lifted/ Supported: Yes               Mom's Ergonomics/Body: Yes                           Supported: Yes                           Sitting Back: Yes                           Brings Baby to her breast: Yes  Positioning Problems: None      Handouts:   Paced bottle feeding, Hands on pumping and Hand expression    Education:  Reviewed Alternative/Artificial Feedings: Discussed and demonstrated paced bottle feeding at parents' request  Reviewed Equipment: Discussed the use and features of the Spectra S2 and the elements of hands on pumping  Plan:  On demand feeding at the breast   Effective hands on pumping as desired  Recommend waiting until after baby is 2 weeks old if she continues to nurse well  Paced bottle feeding of expressed milk as desired  I have spent 90 minutes with Patient and family today in which greater than 50% of this time was spent in counseling/coordination of care regarding Patient and family education

## 2019-08-28 LAB — PLACENTA IN STORAGE: NORMAL

## 2019-09-10 ENCOUNTER — POSTPARTUM VISIT (OUTPATIENT)
Dept: OBGYN CLINIC | Facility: CLINIC | Age: 34
End: 2019-09-10

## 2019-09-10 VITALS — WEIGHT: 148.6 LBS | HEIGHT: 62 IN | BODY MASS INDEX: 27.34 KG/M2

## 2019-09-10 PROCEDURE — 99024 POSTOP FOLLOW-UP VISIT: CPT | Performed by: OBSTETRICS & GYNECOLOGY

## 2019-09-10 NOTE — PROGRESS NOTES
3 wk postpartum appt:   SAB1  19 SAVD (Dr Jazmin Krishnan)  8 lbs 3 oz  Pit augment, shoulder dystocia    Lochia - brown spotting  Normal bowel & bladder habits  Taking prenatal vits w/ dha  Breastfeeding q 2-3 hrs - good latch, good milk supply  Ped - Dr Leopold Place - gaining weight - next appt 19  Some sleep deprivation  Completed [place] Dep scale (score = 4)  Plans to stay home until beg next yr  HGB = 9 4 - recom evie sequels - no lightheadedness or dizziness (boggy uterus after del)  1st degree perineal lac & bilat vag lac w/ repair  Had Rhogam during preg & after delivery  Had gest diabetes (diet control) - ordered FBS & 2 hr post glucose ([facility]) - to be done after 6 wk pp appt  Birth control - undecided - discussed IUD, Depo Provera  Postpartum packet given

## 2019-10-01 ENCOUNTER — POSTPARTUM VISIT (OUTPATIENT)
Dept: OBGYN CLINIC | Facility: CLINIC | Age: 34
End: 2019-10-01

## 2019-10-01 VITALS
WEIGHT: 146 LBS | HEIGHT: 62 IN | SYSTOLIC BLOOD PRESSURE: 90 MMHG | BODY MASS INDEX: 26.87 KG/M2 | DIASTOLIC BLOOD PRESSURE: 60 MMHG

## 2019-10-01 PROBLEM — O24.410 DIET CONTROLLED GESTATIONAL DIABETES MELLITUS (GDM) IN THIRD TRIMESTER: Status: RESOLVED | Noted: 2019-06-27 | Resolved: 2019-10-01

## 2019-10-01 PROBLEM — Z3A.36 36 WEEKS GESTATION OF PREGNANCY: Status: RESOLVED | Noted: 2019-04-19 | Resolved: 2019-10-01

## 2019-10-01 PROCEDURE — 99024 POSTOP FOLLOW-UP VISIT: CPT | Performed by: OBSTETRICS & GYNECOLOGY

## 2019-10-01 NOTE — PATIENT INSTRUCTIONS
Well 6 weeks after delivery no issues nursing is going well infant's doing well    To use condoms for contraception examination within normal limits return to office in 6 months for yearly exam

## 2019-10-01 NOTE — PROGRESS NOTES
This is a 41-year-old white female approximately 6 weeks status post vaginal delivery  She is doing well with nursing no problem postpartum depression or baby blues  She is happy with her weight loss  Infant is thriving and doing well plans using condoms for contraception  Examination of the breasts the abdomen and pelvic exam all within normal limits    All restrictions listed return to office in 6 months for yearly exam

## 2019-10-06 NOTE — PROGRESS NOTES
I have reviewed the notes, assessments, and/or procedures performed by Donis Saint, RN, IBCLC, I concur with her/his documentation of Sharyle Blow

## 2019-12-27 ENCOUNTER — TRANSCRIBE ORDERS (OUTPATIENT)
Dept: ADMINISTRATIVE | Age: 34
End: 2019-12-27

## 2019-12-27 ENCOUNTER — APPOINTMENT (OUTPATIENT)
Dept: LAB | Age: 34
End: 2019-12-27
Attending: PREVENTIVE MEDICINE

## 2019-12-27 DIAGNOSIS — Z02.1 ENCOUNTER FOR PRE-EMPLOYMENT HEALTH SCREENING EXAMINATION: Primary | ICD-10-CM

## 2019-12-27 DIAGNOSIS — Z02.1 ENCOUNTER FOR PRE-EMPLOYMENT HEALTH SCREENING EXAMINATION: ICD-10-CM

## 2019-12-27 PROCEDURE — 86480 TB TEST CELL IMMUN MEASURE: CPT

## 2019-12-27 PROCEDURE — 86787 VARICELLA-ZOSTER ANTIBODY: CPT

## 2019-12-27 PROCEDURE — 36415 COLL VENOUS BLD VENIPUNCTURE: CPT

## 2019-12-27 PROCEDURE — 86765 RUBEOLA ANTIBODY: CPT

## 2019-12-27 PROCEDURE — 86762 RUBELLA ANTIBODY: CPT

## 2019-12-27 PROCEDURE — 86735 MUMPS ANTIBODY: CPT

## 2019-12-28 LAB — RUBV IGG SERPL IA-ACNC: 27.3 IU/ML

## 2019-12-30 LAB
GAMMA INTERFERON BACKGROUND BLD IA-ACNC: 0.06 IU/ML
M TB IFN-G BLD-IMP: NEGATIVE
M TB IFN-G CD4+ BCKGRND COR BLD-ACNC: -0.01 IU/ML
M TB IFN-G CD4+ BCKGRND COR BLD-ACNC: 0 IU/ML
MITOGEN IGNF BCKGRD COR BLD-ACNC: >10 IU/ML

## 2019-12-31 LAB
MEV IGG SER QL: NORMAL
MUV IGG SER QL: NORMAL
VZV IGG SER IA-ACNC: NORMAL

## 2020-09-30 ENCOUNTER — TELEPHONE (OUTPATIENT)
Dept: GASTROENTEROLOGY | Facility: CLINIC | Age: 35
End: 2020-09-30

## 2020-09-30 NOTE — TELEPHONE ENCOUNTER
----- Message from Jones Nicole MD sent at 9/30/2020  1:03 PM EDT -----  Regarding: Please schedule colonoscopy  Hi Gerri Santiago,    Please schedule Ronda Holliday for colonoscopy at [facility] for rectal bleeding       Thank you,    Pamela Calles

## 2020-10-01 ENCOUNTER — PREP FOR PROCEDURE (OUTPATIENT)
Dept: GASTROENTEROLOGY | Facility: CLINIC | Age: 35
End: 2020-10-01

## 2020-10-01 DIAGNOSIS — K62.5 RECTAL BLEEDING: Primary | ICD-10-CM

## 2020-10-01 NOTE — TELEPHONE ENCOUNTER
Colonoscopy scheduled on 10/21/20 with Dr Lopez at [facility]  Patient will call me back tomorrow to confirm

## 2020-10-05 ENCOUNTER — OFFICE VISIT (OUTPATIENT)
Dept: OBGYN CLINIC | Facility: CLINIC | Age: 35
End: 2020-10-05
Payer: COMMERCIAL

## 2020-10-05 VITALS
BODY MASS INDEX: 25.98 KG/M2 | HEIGHT: 62 IN | TEMPERATURE: 98.6 F | WEIGHT: 141.2 LBS | DIASTOLIC BLOOD PRESSURE: 60 MMHG | SYSTOLIC BLOOD PRESSURE: 96 MMHG

## 2020-10-05 DIAGNOSIS — N60.02 BILATERAL BREAST CYSTS: Primary | ICD-10-CM

## 2020-10-05 DIAGNOSIS — N60.01 BILATERAL BREAST CYSTS: Primary | ICD-10-CM

## 2020-10-05 PROCEDURE — 99214 OFFICE O/P EST MOD 30 MIN: CPT | Performed by: OBSTETRICS & GYNECOLOGY

## 2020-10-21 ENCOUNTER — HOSPITAL ENCOUNTER (EMERGENCY)
Facility: HOSPITAL | Age: 35
Discharge: HOME | End: 2020-10-21
Attending: EMERGENCY MEDICINE | Admitting: EMERGENCY MEDICINE
Payer: COMMERCIAL

## 2020-10-21 ENCOUNTER — APPOINTMENT (EMERGENCY)
Dept: RADIOLOGY | Facility: HOSPITAL | Age: 35
End: 2020-10-21
Payer: COMMERCIAL

## 2020-10-21 VITALS
HEART RATE: 86 BPM | WEIGHT: 140 LBS | RESPIRATION RATE: 18 BRPM | TEMPERATURE: 97.4 F | BODY MASS INDEX: 25.76 KG/M2 | DIASTOLIC BLOOD PRESSURE: 59 MMHG | SYSTOLIC BLOOD PRESSURE: 120 MMHG | OXYGEN SATURATION: 98 % | HEIGHT: 62 IN

## 2020-10-21 DIAGNOSIS — R07.81 PLEURITIC PAIN: ICD-10-CM

## 2020-10-21 DIAGNOSIS — R00.2 PALPITATIONS: ICD-10-CM

## 2020-10-21 DIAGNOSIS — G89.29 CHRONIC RIGHT-SIDED THORACIC BACK PAIN: Primary | ICD-10-CM

## 2020-10-21 DIAGNOSIS — M54.6 CHRONIC RIGHT-SIDED THORACIC BACK PAIN: Primary | ICD-10-CM

## 2020-10-21 LAB
ANION GAP SERPL CALCULATED.3IONS-SCNC: 4 MMOL/L (ref 4–13)
ATRIAL RATE: 81 BPM
BUN SERPL-MCNC: 6 MG/DL (ref 5–25)
CALCIUM SERPL-MCNC: 8.7 MG/DL (ref 8.3–10.1)
CHLORIDE SERPL-SCNC: 107 MMOL/L (ref 100–108)
CO2 SERPL-SCNC: 30 MMOL/L (ref 21–32)
CREAT SERPL-MCNC: 0.62 MG/DL (ref 0.6–1.3)
GFR SERPL CREATININE-BSD FRML MDRD: 116 ML/MIN/1.73SQ M
GLUCOSE SERPL-MCNC: 99 MG/DL (ref 65–140)
P AXIS: 78 DEGREES
POTASSIUM SERPL-SCNC: 3.9 MMOL/L (ref 3.5–5.3)
PR INTERVAL: 180 MS
QRS AXIS: 85 DEGREES
QRSD INTERVAL: 86 MS
QT INTERVAL: 346 MS
QTC INTERVAL: 401 MS
SODIUM SERPL-SCNC: 141 MMOL/L (ref 136–145)
T WAVE AXIS: 81 DEGREES
VENTRICULAR RATE: 81 BPM

## 2020-10-21 PROCEDURE — 71046 X-RAY EXAM CHEST 2 VIEWS: CPT

## 2020-10-21 PROCEDURE — 93010 ELECTROCARDIOGRAM REPORT: CPT | Performed by: INTERNAL MEDICINE

## 2020-10-21 PROCEDURE — 80048 BASIC METABOLIC PNL TOTAL CA: CPT | Performed by: EMERGENCY MEDICINE

## 2020-10-21 PROCEDURE — 99284 EMERGENCY DEPT VISIT MOD MDM: CPT | Performed by: EMERGENCY MEDICINE

## 2020-10-21 PROCEDURE — 36415 COLL VENOUS BLD VENIPUNCTURE: CPT | Performed by: EMERGENCY MEDICINE

## 2020-10-21 PROCEDURE — 93005 ELECTROCARDIOGRAM TRACING: CPT

## 2020-10-21 PROCEDURE — 99284 EMERGENCY DEPT VISIT MOD MDM: CPT

## 2020-10-21 RX ORDER — IBUPROFEN 800 MG/1
800 TABLET ORAL DAILY
Qty: 10 TABLET | Refills: 0 | Status: ON HOLD | OUTPATIENT
Start: 2020-10-21 | End: 2021-10-14

## 2020-11-05 ENCOUNTER — HOSPITAL ENCOUNTER (OUTPATIENT)
Dept: MAMMOGRAPHY | Facility: CLINIC | Age: 35
Discharge: HOME | End: 2020-11-05
Payer: COMMERCIAL

## 2020-11-05 ENCOUNTER — HOSPITAL ENCOUNTER (OUTPATIENT)
Dept: ULTRASOUND IMAGING | Facility: CLINIC | Age: 35
Discharge: HOME | End: 2020-11-05
Payer: COMMERCIAL

## 2020-11-05 VITALS — BODY MASS INDEX: 25.76 KG/M2 | TEMPERATURE: 97 F | WEIGHT: 140 LBS | HEIGHT: 62 IN

## 2020-11-05 DIAGNOSIS — N60.02 BILATERAL BREAST CYSTS: ICD-10-CM

## 2020-11-05 DIAGNOSIS — N60.01 BILATERAL BREAST CYSTS: ICD-10-CM

## 2020-11-05 PROCEDURE — G0279 TOMOSYNTHESIS, MAMMO: HCPCS

## 2020-11-05 PROCEDURE — 77066 DX MAMMO INCL CAD BI: CPT

## 2020-11-05 PROCEDURE — 76642 ULTRASOUND BREAST LIMITED: CPT

## 2020-12-08 ENCOUNTER — TELEMEDICINE (OUTPATIENT)
Dept: INTERNAL MEDICINE CLINIC | Facility: CLINIC | Age: 35
End: 2020-12-08
Payer: COMMERCIAL

## 2020-12-08 DIAGNOSIS — G89.29 CHRONIC RIGHT-SIDED THORACIC BACK PAIN: Primary | ICD-10-CM

## 2020-12-08 DIAGNOSIS — M54.6 CHRONIC RIGHT-SIDED THORACIC BACK PAIN: Primary | ICD-10-CM

## 2020-12-08 PROCEDURE — 99213 OFFICE O/P EST LOW 20 MIN: CPT | Performed by: INTERNAL MEDICINE

## 2020-12-11 ENCOUNTER — OFFICE VISIT (OUTPATIENT)
Dept: INTERNAL MEDICINE CLINIC | Facility: CLINIC | Age: 35
End: 2020-12-11
Payer: COMMERCIAL

## 2020-12-11 VITALS
HEIGHT: 62 IN | WEIGHT: 140 LBS | BODY MASS INDEX: 25.76 KG/M2 | OXYGEN SATURATION: 99 % | SYSTOLIC BLOOD PRESSURE: 116 MMHG | RESPIRATION RATE: 18 BRPM | DIASTOLIC BLOOD PRESSURE: 74 MMHG | HEART RATE: 77 BPM | TEMPERATURE: 98.1 F

## 2020-12-11 DIAGNOSIS — E66.3 OVERWEIGHT: ICD-10-CM

## 2020-12-11 DIAGNOSIS — G89.29 CHRONIC RIGHT-SIDED THORACIC BACK PAIN: Primary | ICD-10-CM

## 2020-12-11 DIAGNOSIS — M54.6 CHRONIC RIGHT-SIDED THORACIC BACK PAIN: Primary | ICD-10-CM

## 2020-12-11 PROCEDURE — 99214 OFFICE O/P EST MOD 30 MIN: CPT | Performed by: INTERNAL MEDICINE

## 2020-12-11 RX ORDER — MELOXICAM 15 MG/1
15 TABLET ORAL DAILY
Qty: 30 TABLET | Refills: 5 | Status: SHIPPED | OUTPATIENT
Start: 2020-12-11 | End: 2021-01-20 | Stop reason: ALTCHOICE

## 2020-12-15 ENCOUNTER — TELEPHONE (OUTPATIENT)
Dept: INTERNAL MEDICINE CLINIC | Facility: CLINIC | Age: 35
End: 2020-12-15

## 2020-12-18 ENCOUNTER — TELEPHONE (OUTPATIENT)
Dept: INTERNAL MEDICINE CLINIC | Facility: CLINIC | Age: 35
End: 2020-12-18

## 2020-12-18 DIAGNOSIS — E78.5 HYPERLIPIDEMIA, UNSPECIFIED HYPERLIPIDEMIA TYPE: Primary | ICD-10-CM

## 2020-12-18 DIAGNOSIS — Z00.00 HEALTHCARE MAINTENANCE: ICD-10-CM

## 2020-12-19 ENCOUNTER — LAB (OUTPATIENT)
Dept: LAB | Facility: CLINIC | Age: 35
End: 2020-12-19
Payer: COMMERCIAL

## 2020-12-19 DIAGNOSIS — Z00.00 HEALTHCARE MAINTENANCE: ICD-10-CM

## 2020-12-19 DIAGNOSIS — E78.5 HYPERLIPIDEMIA, UNSPECIFIED HYPERLIPIDEMIA TYPE: ICD-10-CM

## 2020-12-19 LAB
ALBUMIN SERPL BCP-MCNC: 4.6 G/DL (ref 3.5–5)
ALP SERPL-CCNC: 60 U/L (ref 46–116)
ALT SERPL W P-5'-P-CCNC: 14 U/L (ref 12–78)
ANION GAP SERPL CALCULATED.3IONS-SCNC: 8 MMOL/L (ref 4–13)
AST SERPL W P-5'-P-CCNC: 12 U/L (ref 5–45)
BASOPHILS # BLD AUTO: 0.04 THOUSANDS/ΜL (ref 0–0.1)
BASOPHILS NFR BLD AUTO: 1 % (ref 0–1)
BILIRUB SERPL-MCNC: 0.53 MG/DL (ref 0.2–1)
BILIRUB UR QL STRIP: NEGATIVE
BUN SERPL-MCNC: 11 MG/DL (ref 5–25)
CALCIUM SERPL-MCNC: 9.3 MG/DL (ref 8.3–10.1)
CHLORIDE SERPL-SCNC: 101 MMOL/L (ref 100–108)
CHOLEST SERPL-MCNC: 203 MG/DL (ref 50–200)
CLARITY UR: CLEAR
CO2 SERPL-SCNC: 30 MMOL/L (ref 21–32)
COLOR UR: YELLOW
CREAT SERPL-MCNC: 0.69 MG/DL (ref 0.6–1.3)
EOSINOPHIL # BLD AUTO: 0.08 THOUSAND/ΜL (ref 0–0.61)
EOSINOPHIL NFR BLD AUTO: 1 % (ref 0–6)
ERYTHROCYTE [DISTWIDTH] IN BLOOD BY AUTOMATED COUNT: 12 % (ref 11.6–15.1)
GFR SERPL CREATININE-BSD FRML MDRD: 112 ML/MIN/1.73SQ M
GLUCOSE P FAST SERPL-MCNC: 96 MG/DL (ref 65–99)
GLUCOSE UR STRIP-MCNC: NEGATIVE MG/DL
HCT VFR BLD AUTO: 45.4 % (ref 34.8–46.1)
HDLC SERPL-MCNC: 64 MG/DL
HGB BLD-MCNC: 14.8 G/DL (ref 11.5–15.4)
HGB UR QL STRIP.AUTO: NEGATIVE
IMM GRANULOCYTES # BLD AUTO: 0.02 THOUSAND/UL (ref 0–0.2)
IMM GRANULOCYTES NFR BLD AUTO: 0 % (ref 0–2)
KETONES UR STRIP-MCNC: NEGATIVE MG/DL
LDLC SERPL CALC-MCNC: 127 MG/DL (ref 0–100)
LEUKOCYTE ESTERASE UR QL STRIP: NEGATIVE
LYMPHOCYTES # BLD AUTO: 2.47 THOUSANDS/ΜL (ref 0.6–4.47)
LYMPHOCYTES NFR BLD AUTO: 36 % (ref 14–44)
MCH RBC QN AUTO: 31 PG (ref 26.8–34.3)
MCHC RBC AUTO-ENTMCNC: 32.6 G/DL (ref 31.4–37.4)
MCV RBC AUTO: 95 FL (ref 82–98)
MONOCYTES # BLD AUTO: 0.47 THOUSAND/ΜL (ref 0.17–1.22)
MONOCYTES NFR BLD AUTO: 7 % (ref 4–12)
NEUTROPHILS # BLD AUTO: 3.87 THOUSANDS/ΜL (ref 1.85–7.62)
NEUTS SEG NFR BLD AUTO: 55 % (ref 43–75)
NITRITE UR QL STRIP: NEGATIVE
NONHDLC SERPL-MCNC: 139 MG/DL
NRBC BLD AUTO-RTO: 0 /100 WBCS
PH UR STRIP.AUTO: 8 [PH]
PLATELET # BLD AUTO: 367 THOUSANDS/UL (ref 149–390)
PMV BLD AUTO: 9.6 FL (ref 8.9–12.7)
POTASSIUM SERPL-SCNC: 4 MMOL/L (ref 3.5–5.3)
PROT SERPL-MCNC: 8.1 G/DL (ref 6.4–8.2)
PROT UR STRIP-MCNC: NEGATIVE MG/DL
RBC # BLD AUTO: 4.77 MILLION/UL (ref 3.81–5.12)
SODIUM SERPL-SCNC: 139 MMOL/L (ref 136–145)
SP GR UR STRIP.AUTO: 1.02 (ref 1–1.03)
TRIGL SERPL-MCNC: 60 MG/DL
UROBILINOGEN UR QL STRIP.AUTO: 0.2 E.U./DL
WBC # BLD AUTO: 6.95 THOUSAND/UL (ref 4.31–10.16)

## 2020-12-19 PROCEDURE — 80053 COMPREHEN METABOLIC PANEL: CPT

## 2020-12-19 PROCEDURE — 36415 COLL VENOUS BLD VENIPUNCTURE: CPT

## 2020-12-19 PROCEDURE — 85025 COMPLETE CBC W/AUTO DIFF WBC: CPT

## 2020-12-19 PROCEDURE — 81003 URINALYSIS AUTO W/O SCOPE: CPT

## 2020-12-19 PROCEDURE — 80061 LIPID PANEL: CPT

## 2020-12-21 ENCOUNTER — IMMUNIZATIONS (OUTPATIENT)
Dept: FAMILY MEDICINE CLINIC | Facility: HOSPITAL | Age: 35
End: 2020-12-21
Payer: COMMERCIAL

## 2020-12-21 DIAGNOSIS — Z23 ENCOUNTER FOR IMMUNIZATION: ICD-10-CM

## 2020-12-21 PROCEDURE — 91300 SARS-COV-2 / COVID-19 MRNA VACCINE (PFIZER-BIONTECH) 30 MCG: CPT

## 2020-12-21 PROCEDURE — 0001A SARS-COV-2 / COVID-19 MRNA VACCINE (PFIZER-BIONTECH) 30 MCG: CPT

## 2021-01-04 ENCOUNTER — PREP FOR PROCEDURE (OUTPATIENT)
Dept: GASTROENTEROLOGY | Facility: CLINIC | Age: 36
End: 2021-01-04

## 2021-01-04 ENCOUNTER — TELEPHONE (OUTPATIENT)
Dept: GASTROENTEROLOGY | Facility: CLINIC | Age: 36
End: 2021-01-04

## 2021-01-04 DIAGNOSIS — R19.7 DIARRHEA, UNSPECIFIED TYPE: ICD-10-CM

## 2021-01-04 DIAGNOSIS — R10.13 DYSPEPSIA: Primary | ICD-10-CM

## 2021-01-04 DIAGNOSIS — R14.0 ABDOMINAL DISTENTION: Primary | ICD-10-CM

## 2021-01-04 RX ORDER — SODIUM, POTASSIUM,MAG SULFATES 17.5-3.13G
1 SOLUTION, RECONSTITUTED, ORAL ORAL ONCE
Qty: 1 BOTTLE | Refills: 0 | Status: SHIPPED | OUTPATIENT
Start: 2021-01-04 | End: 2021-01-07 | Stop reason: ALTCHOICE

## 2021-01-06 ENCOUNTER — ANESTHESIA EVENT (OUTPATIENT)
Dept: GASTROENTEROLOGY | Facility: HOSPITAL | Age: 36
End: 2021-01-06

## 2021-01-07 ENCOUNTER — EVALUATION (OUTPATIENT)
Dept: PHYSICAL THERAPY | Facility: CLINIC | Age: 36
End: 2021-01-07
Payer: COMMERCIAL

## 2021-01-07 ENCOUNTER — HOSPITAL ENCOUNTER (OUTPATIENT)
Dept: GASTROENTEROLOGY | Facility: HOSPITAL | Age: 36
Setting detail: OUTPATIENT SURGERY
Discharge: HOME | End: 2021-01-07
Attending: INTERNAL MEDICINE
Payer: COMMERCIAL

## 2021-01-07 ENCOUNTER — ANESTHESIA (OUTPATIENT)
Dept: GASTROENTEROLOGY | Facility: HOSPITAL | Age: 36
End: 2021-01-07

## 2021-01-07 VITALS
RESPIRATION RATE: 18 BRPM | SYSTOLIC BLOOD PRESSURE: 102 MMHG | HEART RATE: 83 BPM | TEMPERATURE: 97.9 F | OXYGEN SATURATION: 100 % | DIASTOLIC BLOOD PRESSURE: 51 MMHG

## 2021-01-07 VITALS — HEART RATE: 101 BPM

## 2021-01-07 DIAGNOSIS — R19.7 DIARRHEA, UNSPECIFIED TYPE: ICD-10-CM

## 2021-01-07 DIAGNOSIS — R14.0 ABDOMINAL DISTENTION: ICD-10-CM

## 2021-01-07 DIAGNOSIS — M54.6 CHRONIC RIGHT-SIDED THORACIC BACK PAIN: ICD-10-CM

## 2021-01-07 DIAGNOSIS — M54.12 CERVICAL RADICULOPATHY: Primary | ICD-10-CM

## 2021-01-07 DIAGNOSIS — G89.29 CHRONIC RIGHT-SIDED THORACIC BACK PAIN: ICD-10-CM

## 2021-01-07 LAB
EXT PREGNANCY TEST URINE: NEGATIVE
EXT. CONTROL: NORMAL

## 2021-01-07 PROCEDURE — 43239 EGD BIOPSY SINGLE/MULTIPLE: CPT | Performed by: INTERNAL MEDICINE

## 2021-01-07 PROCEDURE — 97112 NEUROMUSCULAR REEDUCATION: CPT | Performed by: PHYSICAL THERAPIST

## 2021-01-07 PROCEDURE — 88305 TISSUE EXAM BY PATHOLOGIST: CPT | Performed by: PATHOLOGY

## 2021-01-07 PROCEDURE — 97162 PT EVAL MOD COMPLEX 30 MIN: CPT | Performed by: PHYSICAL THERAPIST

## 2021-01-07 PROCEDURE — 97110 THERAPEUTIC EXERCISES: CPT | Performed by: PHYSICAL THERAPIST

## 2021-01-07 PROCEDURE — 45385 COLONOSCOPY W/LESION REMOVAL: CPT | Performed by: INTERNAL MEDICINE

## 2021-01-07 PROCEDURE — 81025 URINE PREGNANCY TEST: CPT | Performed by: INTERNAL MEDICINE

## 2021-01-07 RX ORDER — PROPOFOL 10 MG/ML
INJECTION, EMULSION INTRAVENOUS AS NEEDED
Status: DISCONTINUED | OUTPATIENT
Start: 2021-01-07 | End: 2021-01-07

## 2021-01-07 RX ORDER — SODIUM CHLORIDE 9 MG/ML
125 INJECTION, SOLUTION INTRAVENOUS CONTINUOUS
Status: DISCONTINUED | OUTPATIENT
Start: 2021-01-07 | End: 2021-01-11 | Stop reason: HOSPADM

## 2021-01-07 RX ORDER — LIDOCAINE HYDROCHLORIDE 10 MG/ML
INJECTION, SOLUTION EPIDURAL; INFILTRATION; INTRACAUDAL; PERINEURAL AS NEEDED
Status: DISCONTINUED | OUTPATIENT
Start: 2021-01-07 | End: 2021-01-07

## 2021-01-07 RX ADMIN — PROPOFOL 50 MG: 10 INJECTION, EMULSION INTRAVENOUS at 08:05

## 2021-01-07 RX ADMIN — PROPOFOL 50 MG: 10 INJECTION, EMULSION INTRAVENOUS at 08:07

## 2021-01-07 RX ADMIN — PROPOFOL 25 MG: 10 INJECTION, EMULSION INTRAVENOUS at 08:10

## 2021-01-07 RX ADMIN — PROPOFOL 50 MG: 10 INJECTION, EMULSION INTRAVENOUS at 08:02

## 2021-01-07 RX ADMIN — PROPOFOL 25 MG: 10 INJECTION, EMULSION INTRAVENOUS at 08:16

## 2021-01-07 RX ADMIN — PROPOFOL 25 MG: 10 INJECTION, EMULSION INTRAVENOUS at 08:19

## 2021-01-07 RX ADMIN — PROPOFOL 25 MG: 10 INJECTION, EMULSION INTRAVENOUS at 08:12

## 2021-01-07 RX ADMIN — PROPOFOL 50 MG: 10 INJECTION, EMULSION INTRAVENOUS at 08:06

## 2021-01-07 RX ADMIN — SODIUM CHLORIDE: 0.9 INJECTION, SOLUTION INTRAVENOUS at 07:10

## 2021-01-07 RX ADMIN — PROPOFOL 50 MG: 10 INJECTION, EMULSION INTRAVENOUS at 08:03

## 2021-01-07 RX ADMIN — PROPOFOL 50 MG: 10 INJECTION, EMULSION INTRAVENOUS at 08:08

## 2021-01-07 RX ADMIN — PROPOFOL 25 MG: 10 INJECTION, EMULSION INTRAVENOUS at 08:09

## 2021-01-07 RX ADMIN — PROPOFOL 25 MG: 10 INJECTION, EMULSION INTRAVENOUS at 08:14

## 2021-01-07 RX ADMIN — PROPOFOL 25 MG: 10 INJECTION, EMULSION INTRAVENOUS at 08:20

## 2021-01-07 RX ADMIN — PROPOFOL 25 MG: 10 INJECTION, EMULSION INTRAVENOUS at 08:11

## 2021-01-07 RX ADMIN — PROPOFOL 25 MG: 10 INJECTION, EMULSION INTRAVENOUS at 08:15

## 2021-01-07 RX ADMIN — PROPOFOL 25 MG: 10 INJECTION, EMULSION INTRAVENOUS at 08:30

## 2021-01-07 RX ADMIN — PROPOFOL 25 MG: 10 INJECTION, EMULSION INTRAVENOUS at 08:18

## 2021-01-07 RX ADMIN — PROPOFOL 25 MG: 10 INJECTION, EMULSION INTRAVENOUS at 08:27

## 2021-01-07 RX ADMIN — PROPOFOL 25 MG: 10 INJECTION, EMULSION INTRAVENOUS at 08:24

## 2021-01-07 RX ADMIN — PROPOFOL 25 MG: 10 INJECTION, EMULSION INTRAVENOUS at 08:13

## 2021-01-07 RX ADMIN — PROPOFOL 25 MG: 10 INJECTION, EMULSION INTRAVENOUS at 08:17

## 2021-01-07 RX ADMIN — PROPOFOL 50 MG: 10 INJECTION, EMULSION INTRAVENOUS at 08:04

## 2021-01-07 RX ADMIN — SODIUM CHLORIDE: 0.9 INJECTION, SOLUTION INTRAVENOUS at 08:23

## 2021-01-07 RX ADMIN — PROPOFOL 25 MG: 10 INJECTION, EMULSION INTRAVENOUS at 08:21

## 2021-01-07 RX ADMIN — LIDOCAINE HYDROCHLORIDE 70 MG: 10 INJECTION, SOLUTION EPIDURAL; INFILTRATION; INTRACAUDAL; PERINEURAL at 08:02

## 2021-01-07 NOTE — ANESTHESIA PREPROCEDURE EVALUATION
Procedure:  EGD  COLONOSCOPY    Relevant Problems   CARDIO   (+) Hyperlipidemia      GI/HEPATIC   (+) GERD without esophagitis      MUSCULOSKELETAL   (+) Chronic right-sided thoracic back pain      NEURO/PSYCH   (+) Anxiety   (+) Chronic right-sided thoracic back pain      Other   (+) Dyspepsia      Smoker (not today)  Right sided mid back pain after labor, around 15 months ago  No neuropathy symptoms      Physical Exam    Airway    Mallampati score: II  TM Distance: >3 FB  Neck ROM: full     Dental   No notable dental hx     Cardiovascular      Pulmonary      Other Findings        Anesthesia Plan  ASA Score- 2     Anesthesia Type- IV sedation with anesthesia with ASA Monitors  Additional Monitors:   Airway Plan:           Plan Factors-    Chart reviewed  EKG reviewed  Existing labs reviewed  Patient summary reviewed  Patient is a current smoker  Patient did not smoke on day of surgery  Induction- intravenous  Postoperative Plan-     Informed Consent- Anesthetic plan and risks discussed with patient  I personally reviewed this patient with the CRNA  Discussed and agreed on the Anesthesia Plan with the CRNA  Dania Whittington

## 2021-01-07 NOTE — ANESTHESIA POSTPROCEDURE EVALUATION
Post-Op Assessment Note    CV Status:  Stable    Pain management: adequate     Mental Status:  Alert and awake   Hydration Status:  Euvolemic   PONV Controlled:  Controlled   Airway Patency:  Patent      Post Op Vitals Reviewed: Yes      Staff: CRNA         No complications documented      /56 (01/07/21 0839)    Temp      Pulse 86 (01/07/21 0839)   Resp 18 (01/07/21 0839)    SpO2 100 % (01/07/21 0839)

## 2021-01-07 NOTE — PROGRESS NOTES
PT Evaluation     Today's date: 2021  Patient name: Miriam Whitman  : 1985  MRN: [id number]  Referring provider: Maryln Hamman, DO  Dx:   Encounter Diagnosis     ICD-10-CM    1  Cervical radiculopathy  M54 12    2  Chronic right-sided thoracic back pain  M54 6 Ambulatory referral to Physical Therapy    G89 29                   Assessment  Assessment details: Pt presents with signs and symptoms synonymous of admitting diagnosis as well as referral symptom of C6-7 for scapular pain and possible referral of C1-3 for Cervicogenic based HA's that appeared to be abolished with postural and ext based intervention  Pt presents with pain, decreased strength, decreased range, flexibility, decreased joint mobility as well as tolerance to activity and postural awareness  Pt would benefit skilled PT intervention in order to address these impairments in order to be able to perform all desired activities with minimal to nil symptom exacerbation  Thank you very much for this referral      Impairments: abnormal or restricted ROM, activity intolerance, impaired physical strength, lacks appropriate home exercise program, pain with function, poor posture  and poor body mechanics  Understanding of Dx/Px/POC: good   Prognosis: good    Goals  STG 4 Weeks:  Decrease pain at worst to 7  Improve range to improve CS range by 10 all planes TS to min  Improve strength to UE 4/5, DNF 20"  Independent with HEP  LTG 8 Weeks:  Decrease pain at worst to 4  Improve range to by 15 CS all planes from IE      Improve strength to DNF 30"  Able to perform all desired activities with minimal to nil symptom exacerbation      Plan  Patient would benefit from: skilled physical therapy  Planned modality interventions: cryotherapy, TENS and thermotherapy: hydrocollator packs  Planned therapy interventions: joint mobilization, abdominal trunk stabilization, manual therapy, neuromuscular re-education, patient education, postural training, prosthetic fitting/training, strengthening, stretching, therapeutic activities, therapeutic exercise, therapeutic training, transfer training, home exercise program, graded motor, graded exercise, graded activity, functional ROM exercises and flexibility  Frequency: 2x week  Duration in weeks: 10  Treatment plan discussed with: patient        Subjective Evaluation    History of Present Illness  Date of onset: 1/7/2021  Mechanism of injury: Pt is a 38 yofemale who is RHD presents today stating that she developed R sided glute to R knee tingling sensation s/p birth of daughter in August of 2019 via natural birth  She indicates it occurred 4-6 months and then stopped  Pt reports about 3 months ago developed insidious onset of R shoulder blade to the side of her spine  Pt does report a popping shoulder blade, but denies any pain associated with it and has had for several years  Pt reports that the symptoms that there is a tingle and worsens to a dull ache that occurs bending forward and when standing for extended periods of time 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  She states that normally it fluctuates between 0/10, and can get up to a 7/10 but one incident back in October she did go to the ER due to pain  Pt reports X-rays, blood work, and diagnosis of pleuritis  Pt reports that when she does have pain, medication doesn't assist, but topical analgesic does  Pt denies any symptoms into legs, but does have symptoms into her R > L head at least weekly with pain that travels from R neck down to under her shoulder blade  Pt denies have symptoms into arms, no change in bowel or bladder  Pt reports the Head and neck pain can go into both sides in to her forehead  Pt will take medication when her symptoms of this presentation occur  Pt reports occasional tail bone pain    Pt met with her family physician who referred her to PT, she is also following up with her GI doctor for imagery and return of biopsy results from a GI test   Pt reports difficulty falling asleep at night, she denies waking up due to pain  Pt also associates pain intensifies with her anxiety and stress  Pt reports goals are to be able to improve her Torso/Thoracic pain, as well as head and neck pain  Pt indicates having difficulty with recreational exercise, procedures at work, or changing her daughter's diaper  Quality of life: good    Pain  Current pain ratin  At best pain ratin  At worst pain rating: 10  Quality: needle-like, pulling and dull ache  Relieving factors: relaxation (tiger bomb   )  Aggravating factors: lifting and overhead activity (bending forward  )  Progression: no change      Diagnostic Tests  X-ray: normal  MRI studies: normal  Treatments  Previous treatment: medication  Patient Goals  Patient goals for therapy: increased motion, increased strength, return to sport/leisure activities and decreased pain          Objective     Active Range of Motion   Cervical/Thoracic Spine       Cervical    Flexion: 40 degrees   Extension: 55 degrees      Left lateral flexion: 40 degrees      Right lateral flexion: 25 degrees     with pain  Left rotation: 75 degrees  Right rotation: 60 degrees    with pain    Thoracic    Flexion:  Restriction level: minimal  Extension:  Restriction level: maximal  Left lateral flexion:  Restriction level: minimal  Right lateral flexion:  Restriction level: minimal  Left rotation:  Restriction level: maximal  Right rotation:  Restriction level: maximal    Additional Active Range of Motion Details  Forward head, rounded shoulders  B/L Sensation intact to light touch C3,4,5,6,7,8,T1,T2  Shoulder strength  L Flex 4 Abd 4- ER 5 IR 5  R Flex 4 Abd 4- ER 5 IR 5  Postural correction R inferior shoulder blade  Abolished  UE screen strong and painless  Repeated motion   Flex  Retraction nil  R shoulder blade worse  Same  Abolished  Ext  SB R  SB L  Joint Mobility:  G1 C5-T1, G2 C2-5      Palpation: mild Pain at C5-6-7   STs  DNF  11"  (-) Sharp Denisa, Alar Lig intact  Precautions: Nil      Manuals 1/7            Supine PA C2-7  G2-4             Prone TS 1-12 G2-4                                       Neuro Re-Ed             Postural Ed 10 min            Tband Row + Ext   G/RTB          Tband ER   RTB                                                              Ther Ex             CS retraction 4 x 10            CS retraction OP progression?               Scap Add 10" x 10            Scaption             Supine CS retraction             SA punches             TS + CS chair bolster Ext   6"                         Ther Activity             OH lifting 2-5#                          Gait Training                                       Modalities             Prone HP to TS/CS prn

## 2021-01-07 NOTE — ADDENDUM NOTE
Addendum  created 01/07/21 0949 by Alissa Fishman CRNA    Flowsheet accepted, Intraprocedure Flowsheets edited

## 2021-01-08 ENCOUNTER — HOSPITAL ENCOUNTER (EMERGENCY)
Facility: HOSPITAL | Age: 36
Discharge: HOME | End: 2021-01-09
Attending: EMERGENCY MEDICINE | Admitting: EMERGENCY MEDICINE
Payer: COMMERCIAL

## 2021-01-08 ENCOUNTER — APPOINTMENT (EMERGENCY)
Dept: ULTRASOUND IMAGING | Facility: HOSPITAL | Age: 36
End: 2021-01-08
Payer: COMMERCIAL

## 2021-01-08 ENCOUNTER — APPOINTMENT (EMERGENCY)
Dept: RADIOLOGY | Facility: HOSPITAL | Age: 36
End: 2021-01-08
Payer: COMMERCIAL

## 2021-01-08 VITALS
TEMPERATURE: 98 F | SYSTOLIC BLOOD PRESSURE: 136 MMHG | DIASTOLIC BLOOD PRESSURE: 77 MMHG | HEART RATE: 74 BPM | RESPIRATION RATE: 18 BRPM | OXYGEN SATURATION: 98 %

## 2021-01-08 DIAGNOSIS — R10.10 PAIN OF UPPER ABDOMEN: Primary | ICD-10-CM

## 2021-01-08 LAB
ALBUMIN SERPL BCP-MCNC: 4.3 G/DL (ref 3.5–5)
ALP SERPL-CCNC: 60 U/L (ref 46–116)
ALT SERPL W P-5'-P-CCNC: 17 U/L (ref 12–78)
ANION GAP SERPL CALCULATED.3IONS-SCNC: 8 MMOL/L (ref 4–13)
AST SERPL W P-5'-P-CCNC: 12 U/L (ref 5–45)
BASOPHILS # BLD AUTO: 0.04 THOUSANDS/ΜL (ref 0–0.1)
BASOPHILS NFR BLD AUTO: 0 % (ref 0–1)
BILIRUB SERPL-MCNC: 0.22 MG/DL (ref 0.2–1)
BILIRUB UR QL STRIP: ABNORMAL
BUN SERPL-MCNC: 8 MG/DL (ref 5–25)
CALCIUM SERPL-MCNC: 9 MG/DL (ref 8.3–10.1)
CHLORIDE SERPL-SCNC: 102 MMOL/L (ref 100–108)
CLARITY UR: CLEAR
CO2 SERPL-SCNC: 28 MMOL/L (ref 21–32)
COLOR UR: ABNORMAL
CREAT SERPL-MCNC: 0.58 MG/DL (ref 0.6–1.3)
EOSINOPHIL # BLD AUTO: 0.19 THOUSAND/ΜL (ref 0–0.61)
EOSINOPHIL NFR BLD AUTO: 2 % (ref 0–6)
ERYTHROCYTE [DISTWIDTH] IN BLOOD BY AUTOMATED COUNT: 11.9 % (ref 11.6–15.1)
GFR SERPL CREATININE-BSD FRML MDRD: 119 ML/MIN/1.73SQ M
GLUCOSE SERPL-MCNC: 102 MG/DL (ref 65–140)
GLUCOSE UR STRIP-MCNC: NEGATIVE MG/DL
HCT VFR BLD AUTO: 38.5 % (ref 34.8–46.1)
HGB BLD-MCNC: 12.6 G/DL (ref 11.5–15.4)
HGB UR QL STRIP.AUTO: ABNORMAL
HOLD SPECIMEN: NORMAL
IMM GRANULOCYTES # BLD AUTO: 0.04 THOUSAND/UL (ref 0–0.2)
IMM GRANULOCYTES NFR BLD AUTO: 0 % (ref 0–2)
KETONES UR STRIP-MCNC: ABNORMAL MG/DL
LEUKOCYTE ESTERASE UR QL STRIP: ABNORMAL
LIPASE SERPL-CCNC: 115 U/L (ref 73–393)
LYMPHOCYTES # BLD AUTO: 3.69 THOUSANDS/ΜL (ref 0.6–4.47)
LYMPHOCYTES NFR BLD AUTO: 36 % (ref 14–44)
MCH RBC QN AUTO: 30.9 PG (ref 26.8–34.3)
MCHC RBC AUTO-ENTMCNC: 32.7 G/DL (ref 31.4–37.4)
MCV RBC AUTO: 94 FL (ref 82–98)
MONOCYTES # BLD AUTO: 0.68 THOUSAND/ΜL (ref 0.17–1.22)
MONOCYTES NFR BLD AUTO: 7 % (ref 4–12)
NEUTROPHILS # BLD AUTO: 5.72 THOUSANDS/ΜL (ref 1.85–7.62)
NEUTS SEG NFR BLD AUTO: 55 % (ref 43–75)
NITRITE UR QL STRIP: NEGATIVE
NRBC BLD AUTO-RTO: 0 /100 WBCS
PH UR STRIP.AUTO: 6 [PH] (ref 4.5–8)
PLATELET # BLD AUTO: 326 THOUSANDS/UL (ref 149–390)
PMV BLD AUTO: 9.3 FL (ref 8.9–12.7)
POTASSIUM SERPL-SCNC: 3.8 MMOL/L (ref 3.5–5.3)
PROT SERPL-MCNC: 7.6 G/DL (ref 6.4–8.2)
PROT UR STRIP-MCNC: ABNORMAL MG/DL
RBC # BLD AUTO: 4.08 MILLION/UL (ref 3.81–5.12)
SODIUM SERPL-SCNC: 138 MMOL/L (ref 136–145)
SP GR UR STRIP.AUTO: 1.02 (ref 1–1.03)
UROBILINOGEN UR QL STRIP.AUTO: 0.2 E.U./DL
WBC # BLD AUTO: 10.36 THOUSAND/UL (ref 4.31–10.16)

## 2021-01-08 PROCEDURE — 93005 ELECTROCARDIOGRAM TRACING: CPT

## 2021-01-08 PROCEDURE — 81001 URINALYSIS AUTO W/SCOPE: CPT

## 2021-01-08 PROCEDURE — 80053 COMPREHEN METABOLIC PANEL: CPT | Performed by: EMERGENCY MEDICINE

## 2021-01-08 PROCEDURE — 76705 ECHO EXAM OF ABDOMEN: CPT

## 2021-01-08 PROCEDURE — 99285 EMERGENCY DEPT VISIT HI MDM: CPT | Performed by: EMERGENCY MEDICINE

## 2021-01-08 PROCEDURE — 71046 X-RAY EXAM CHEST 2 VIEWS: CPT

## 2021-01-08 PROCEDURE — 99284 EMERGENCY DEPT VISIT MOD MDM: CPT

## 2021-01-08 PROCEDURE — 85025 COMPLETE CBC W/AUTO DIFF WBC: CPT | Performed by: EMERGENCY MEDICINE

## 2021-01-08 PROCEDURE — 36415 COLL VENOUS BLD VENIPUNCTURE: CPT

## 2021-01-08 PROCEDURE — 87086 URINE CULTURE/COLONY COUNT: CPT

## 2021-01-08 PROCEDURE — 83690 ASSAY OF LIPASE: CPT | Performed by: EMERGENCY MEDICINE

## 2021-01-08 RX ORDER — KETOROLAC TROMETHAMINE 30 MG/ML
15 INJECTION, SOLUTION INTRAMUSCULAR; INTRAVENOUS ONCE
Status: COMPLETED | OUTPATIENT
Start: 2021-01-08 | End: 2021-01-09

## 2021-01-09 ENCOUNTER — TELEPHONE (OUTPATIENT)
Dept: GASTROENTEROLOGY | Facility: CLINIC | Age: 36
End: 2021-01-09

## 2021-01-09 DIAGNOSIS — R10.13 DYSPEPSIA: Primary | ICD-10-CM

## 2021-01-09 LAB
BACTERIA UR QL AUTO: ABNORMAL /HPF
NON-SQ EPI CELLS URNS QL MICRO: ABNORMAL /HPF
RBC #/AREA URNS AUTO: ABNORMAL /HPF
WBC #/AREA URNS AUTO: ABNORMAL /HPF

## 2021-01-09 PROCEDURE — 96374 THER/PROPH/DIAG INJ IV PUSH: CPT

## 2021-01-09 RX ORDER — OMEPRAZOLE 20 MG/1
20 CAPSULE, DELAYED RELEASE ORAL DAILY
Qty: 90 CAPSULE | Refills: 3 | Status: SHIPPED | OUTPATIENT
Start: 2021-01-09 | End: 2021-07-28

## 2021-01-09 RX ADMIN — HYOSCYAMINE SULFATE 0.12 MG: 0.12 TABLET SUBLINGUAL at 00:20

## 2021-01-09 RX ADMIN — KETOROLAC TROMETHAMINE 15 MG: 30 INJECTION, SOLUTION INTRAMUSCULAR at 00:20

## 2021-01-09 NOTE — ED PROVIDER NOTES
History  Chief Complaint   Patient presents with    Abdominal Pain     Pt presents to the ED with c/o upper abdominal pain that radiates to her chest  Pt also c/o mid back pain  This is a 38 y o  old female who presents to the ED for evaluation of abdominal pain  Two days of upper abdominal pain that radiates into her back and up into her chest   Social with nausea  Postprandial feels when she gets home from work  He does feel weak with it  Had endoscopy and colonoscopy yesterday  The pain to get worse after that however not acutely  It has been gradually getting worse  History of diarrhea, none recently  No fever chills  No cough congestion or runny nose  No shortness of breath  No known COVID exposures  Prior to Admission Medications   Prescriptions Last Dose Informant Patient Reported? Taking?    B Complex Vitamins (B COMPLEX-B12 PO)  Self Yes No   Sig: Take by mouth daily   Calcium-Magnesium-Vitamin D 185- MG-MG-UNIT CAPS  Self Yes No   Sig: Take by mouth   Cholecalciferol (VITAMIN D3) 2000 units capsule  Self Yes No   Sig: Take 2,000 Units by mouth daily   Docosahexaenoic Acid (DHA OMEGA 3 PO)  Self Yes No   Sig: Take by mouth   Multiple Vitamins-Minerals (MULTIVITAMIN GUMMIES ADULTS PO)  Self Yes No   Sig: Take by mouth daily   ibuprofen (MOTRIN) 600 mg tablet   No No   Sig: Take 1 tablet (600 mg total) by mouth every 6 (six) hours as needed for mild pain or moderate pain (cramping)   Patient not taking: Reported on 8/27/2019   ibuprofen (MOTRIN) 800 mg tablet   No No   Sig: Take 1 tablet (800 mg total) by mouth daily for 10 doses   meloxicam (MOBIC) 15 mg tablet   No No   Sig: Take 1 tablet (15 mg total) by mouth daily      Facility-Administered Medications: None     Past Medical History:   Diagnosis Date    Abnormal Pap smear of cervix     GERD (gastroesophageal reflux disease)     Helicobacter pylori (H  pylori) infection     Varicella     as child     Past Surgical History: Procedure Laterality Date    CERVICAL BIOPSY  W/ LOOP ELECTRODE EXCISION          CT ESOPHAGOGASTRODUODENOSCOPY TRANSORAL DIAGNOSTIC N/A 2016    Procedure: ESOPHAGOGASTRODUODENOSCOPY (EGD); Surgeon: Katherine Espinoza MD;  Location: [facility]; Service: Gastroenterology     Family History   Problem Relation Age of Onset    Depression Father     Hyperlipidemia Father     Depression Brother     Mental illness Family         anxiety    Cancer Maternal Grandfather         bile duct cancer/stomach ca    Hyperlipidemia Mother     No Known Problems Maternal Grandmother     Heart disease Paternal Grandfather         MI    No Known Problems Daughter      I have reviewed and agree with the history as documented  E-Cigarette/Vaping     E-Cigarette/Vaping Substances     Social History     Tobacco Use    Smoking status: Former Smoker     Packs/day: 0 25     Years: 8 00     Pack years: 2 00     Types: Cigarettes     Quit date: 2017     Years since quittin 0    Smokeless tobacco: Never Used   Substance Use Topics    Alcohol use: Yes     Frequency: 2-3 times a week     Drinks per session: 1 or 2     Binge frequency: Never     Comment: social    Drug use: No     Review of Systems   Constitutional: Negative for chills, fatigue, fever and unexpected weight change  HENT: Negative for congestion, rhinorrhea and sore throat  Eyes: Negative for redness and visual disturbance  Respiratory: Negative for cough and shortness of breath  Cardiovascular: Negative for chest pain and leg swelling  Gastrointestinal: Positive for abdominal pain and nausea  Negative for constipation, diarrhea and vomiting  Endocrine: Negative for cold intolerance and heat intolerance  Genitourinary: Negative for dysuria, frequency and urgency  Musculoskeletal: Negative for back pain  Skin: Negative for rash  Neurological: Negative for dizziness, syncope and numbness     All other systems reviewed and are negative  Physical Exam  Physical Exam  Vitals signs and nursing note reviewed  Constitutional:       General: She is not in acute distress  Appearance: She is well-developed  She is not diaphoretic  HENT:      Head: Normocephalic and atraumatic  Nose: Nose normal       Mouth/Throat:      Pharynx: No oropharyngeal exudate  Eyes:      Conjunctiva/sclera: Conjunctivae normal       Pupils: Pupils are equal, round, and reactive to light  Neck:      Musculoskeletal: Normal range of motion and neck supple  Cardiovascular:      Rate and Rhythm: Normal rate and regular rhythm  Heart sounds: Normal heart sounds  No murmur  No gallop  Pulmonary:      Effort: Pulmonary effort is normal       Breath sounds: Normal breath sounds  No wheezing  Chest:      Chest wall: No tenderness  Abdominal:      General: Bowel sounds are normal  There is no distension  Palpations: Abdomen is soft  Tenderness: There is abdominal tenderness (mild) in the epigastric area  There is no guarding or rebound  Musculoskeletal: Normal range of motion  General: No tenderness or deformity  Lymphadenopathy:      Cervical: No cervical adenopathy  Skin:     General: Skin is warm and dry  Findings: No erythema or rash  Neurological:      Mental Status: She is alert and oriented to person, place, and time  Cranial Nerves: No cranial nerve deficit         Vital Signs  ED Triage Vitals [01/08/21 2000]   Temperature Pulse Respirations Blood Pressure SpO2   98 °F (36 7 °C) 74 18 136/77 98 %      Temp Source Heart Rate Source Patient Position - Orthostatic VS BP Location FiO2 (%)   Oral Monitor -- Right arm --      Pain Score       8         ED Medications  Medications   ketorolac (TORADOL) injection 15 mg (15 mg Intravenous Given 1/9/21 0020)   hyoscyamine (LEVSIN/SL) SL tablet 0 125 mg (0 125 mg Sublingual Given 1/9/21 0020)       Diagnostic Studies  Results Reviewed     Procedure Component Value Units Date/Time    Urine Microscopic [[id number]]  (Abnormal) Collected: 01/08/21 2333    Lab Status: Final result Specimen: Urine Updated: 01/09/21 0005     RBC, UA Innumerable /hpf      WBC, UA       Field obscured, unable to enumerate     /hpf     Epithelial Cells Occasional /hpf      Bacteria, UA Occasional /hpf     Urine culture [[id number]] Collected: 01/08/21 2333    Lab Status:  In process Specimen: Urine Updated: 01/09/21 0005    Urine Macroscopic, POC [[id number]]  (Abnormal) Collected: 01/08/21 2333    Lab Status: Final result Specimen: Urine Updated: 01/08/21 2334     Color, UA Red     Clarity, UA Clear     pH, UA 6 0     Leukocytes, UA Trace     Nitrite, UA Negative     Protein,  (2+) mg/dl      Glucose, UA Negative mg/dl      Ketones, UA 15 (1+) mg/dl      Urobilinogen, UA 0 2 E U /dl      Bilirubin, UA Interference- unable to analyze     Blood, UA Large     Specific Porter, UA 1 020    Narrative:      CLINITEK RESULT    Comprehensive metabolic panel [[id number]]  (Abnormal) Collected: 01/08/21 2006    Lab Status: Final result Specimen: Blood from Arm, Left Updated: 01/08/21 2036     Sodium 138 mmol/L      Potassium 3 8 mmol/L      Chloride 102 mmol/L      CO2 28 mmol/L      ANION GAP 8 mmol/L      BUN 8 mg/dL      Creatinine 0 58 mg/dL      Glucose 102 mg/dL      Calcium 9 0 mg/dL      AST 12 U/L      ALT 17 U/L      Alkaline Phosphatase 60 U/L      Total Protein 7 6 g/dL      Albumin 4 3 g/dL      Total Bilirubin 0 22 mg/dL      eGFR 119 ml/min/1 73sq m     Narrative:      [facility] guidelines for Chronic Kidney Disease (CKD):     Stage 1 with normal or high GFR (GFR > 90 mL/min/1 73 square meters)    Stage 2 Mild CKD (GFR = 60-89 mL/min/1 73 square meters)    Stage 3A Moderate CKD (GFR = 45-59 mL/min/1 73 square meters)    Stage 3B Moderate CKD (GFR = 30-44 mL/min/1 73 square meters)    Stage 4 Severe CKD (GFR = 15-29 mL/min/1 73 square meters)    Stage 5 End Stage CKD (GFR <15 mL/min/1 73 square meters)  Note: GFR calculation is accurate only with a steady state creatinine    Lipase [[id number]]  (Normal) Collected: 01/08/21 2006    Lab Status: Final result Specimen: Blood from Arm, Left Updated: 01/08/21 2036     Lipase 115 u/L     CBC and differential [[id number]]  (Abnormal) Collected: 01/08/21 2006    Lab Status: Final result Specimen: Blood from Arm, Left Updated: 01/08/21 2015     WBC 10 36 Thousand/uL      RBC 4 08 Million/uL      Hemoglobin 12 6 g/dL      Hematocrit 38 5 %      MCV 94 fL      MCH 30 9 pg      MCHC 32 7 g/dL      RDW 11 9 %      MPV 9 3 fL      Platelets 473 Thousands/uL      nRBC 0 /100 WBCs      Neutrophils Relative 55 %      Immat GRANS % 0 %      Lymphocytes Relative 36 %      Monocytes Relative 7 %      Eosinophils Relative 2 %      Basophils Relative 0 %      Neutrophils Absolute 5 72 Thousands/µL      Immature Grans Absolute 0 04 Thousand/uL      Lymphocytes Absolute 3 69 Thousands/µL      Monocytes Absolute 0 68 Thousand/µL      Eosinophils Absolute 0 19 Thousand/µL      Basophils Absolute 0 04 Thousands/µL         XR chest 2 views   ED Interpretation by Teddy Hernandez MD (01/09 0018)   Chest xray shows no evidence of focal consolidation, pleural effusion, pneumothorax, or other acute pulmonary pathology as interpreted by me  Final Result by Franca Funez DO (01/09 0009)      No acute cardiopulmonary disease is seen  Workstation performed: [id number]         US right upper quadrant   ED Interpretation by Teddy Hernandez MD (01/09 0019)   Normal gallbladder, as interpreted by me      Final Result by Crissy Patterson MD (01/09 0003)      No sonographic evidence of acute cholecystitis  Negative sonographic Vincent's        Workstation performed: [id number]           Procedures  ECG 12 Lead Documentation Only    Date/Time: 1/8/2021 11:06 PM  Performed by: Teddy Hernandez MD  Authorized by: Teddy Hernandez MD     Indications / Diagnosis:  Abdominal pain  ECG reviewed by me, the ED Provider: yes    Patient location:  ED  Comments:      Normal sinus rhythm, rate 75, normal axis, normal intervals, no ST-T wave changes  No previous EKG available for comparison  ED Course  ED Course as of Jan 09 0029   Milind Lezama Jan 08, 2021   2308 US at bedside taking patient now  2359 CXR ?abnromal  Called radiology to ask for read  Pending US read as well  A/P: This is a 38 y o  female who presents to the ED for evaluation of abd pain  Upper  PE can be excluded  No peritoneal signs, doubt perf based on exam  Labs reviewed, unremarkable  EKG OK, pericarditis ruled out  Etiolgy unclear  RUQ US, pain control  Reevaluate  Wells Score - PE  (0) Clinical signs and symptoms of DVT (+3)  (0) PE is #1 diagnosis, or equally likely (+3)  (0) Heart rate greater than 100 (+1 5)  (0) Immobilization at least 3 days, or surgery in the past 4 weeks (+1 5)  (0) Previous objectively diagnosed PE or DVT (+1 5)  (0) Hemoptysis (+1)  (0) Malignancy with treatment within 6 months or palliative (+1)  Today's Score: 0  Scoring: >6: High Probability, 2-6: Moderate Probability, <2: Low Probability    PERC Rule for PE      Most Recent Value   PERC Rule for PE   Age >=50  0 Filed at: 01/08/2021 2217   HR >=100  0 Filed at: 01/08/2021 2217   O2 Sat on room air < 95%  0 Filed at: 01/08/2021 2217   History of PE or DVT  0 Filed at: 01/08/2021 2217   Recent trauma or surgery  0 Filed at: 01/08/2021 2217   Hemoptysis  0 Filed at: 01/08/2021 2217   Exogenous estrogen  0 Filed at: 01/08/2021 2217   Unilateral leg swelling  0 Filed at: 01/08/2021 2217   PERC Rule for PE Results  0 Filed at: 01/08/2021 2217        Radiologist has read the chest x-ray and ultrasound showing no acute abnormalities  Lengthy discussion with the patient the etiology is unclear  The postprandial aspect could suggest GI etiology others no evidence of gastritis or other inflammation    Recommend she gets a food journal for follow-up gastroenterology  PPI if she wishes  Patient does have cervical radiculopathy, it is possible that this could be referred pain in for cervical radiculopathy does include the nerve root for the phrenic nerve and this could be more diaphragmatic in etiology since it is epigastric and around to the back on the right side  I personally discussed return precautions with this patient and family  I provided the patient with written discharge instructions and particularly highlighted specific areas of interest to this patient, including but not limited to: medications for symptom managment, follow up recommendations, and return precautions  Patient and family are in agreement with this plan as outlined above  MDM    Disposition  Final diagnoses:   Pain of upper abdomen     Time reflects when diagnosis was documented in both MDM as applicable and the Disposition within this note     Time User Action Codes Description Comment    1/9/2021 12:26 AM Eric Unger Add [R10 10] Pain of upper abdomen       ED Disposition     ED Disposition Condition Date/Time Comment    Discharge Stable Sat Jan 9, 2021 12:25 AM Vinny Gearing discharge to home/self care  Follow-up Information     Follow up With Specialties Details Why [address] [facility], DO Internal Medicine   [address] [place] [address] [facility], DO Gastroenterology   [address]  [phone]           Patient's Medications   Discharge Prescriptions    No medications on file     No discharge procedures on file      PDMP Review     None        ED Provider  Electronically Signed by           Ramo Gross MD  01/09/21 3004

## 2021-01-10 LAB
BACTERIA UR CULT: NORMAL
BACTERIA UR CULT: NORMAL

## 2021-01-11 LAB
ATRIAL RATE: 79 BPM
P AXIS: 64 DEGREES
PR INTERVAL: 184 MS
QRS AXIS: 69 DEGREES
QRSD INTERVAL: 84 MS
QT INTERVAL: 354 MS
QTC INTERVAL: 396 MS
T WAVE AXIS: 64 DEGREES
VENTRICULAR RATE: 75 BPM

## 2021-01-11 PROCEDURE — 93010 ELECTROCARDIOGRAM REPORT: CPT | Performed by: INTERNAL MEDICINE

## 2021-01-12 ENCOUNTER — IMMUNIZATIONS (OUTPATIENT)
Dept: FAMILY MEDICINE CLINIC | Facility: HOSPITAL | Age: 36
End: 2021-01-12

## 2021-01-12 DIAGNOSIS — Z23 ENCOUNTER FOR IMMUNIZATION: ICD-10-CM

## 2021-01-12 PROCEDURE — 0002A SARS-COV-2 / COVID-19 MRNA VACCINE (PFIZER-BIONTECH) 30 MCG: CPT

## 2021-01-12 PROCEDURE — 91300 SARS-COV-2 / COVID-19 MRNA VACCINE (PFIZER-BIONTECH) 30 MCG: CPT

## 2021-01-13 ENCOUNTER — OFFICE VISIT (OUTPATIENT)
Dept: PHYSICAL THERAPY | Facility: CLINIC | Age: 36
End: 2021-01-13
Payer: COMMERCIAL

## 2021-01-13 DIAGNOSIS — M54.12 CERVICAL RADICULOPATHY: Primary | ICD-10-CM

## 2021-01-13 DIAGNOSIS — M54.6 CHRONIC RIGHT-SIDED THORACIC BACK PAIN: ICD-10-CM

## 2021-01-13 DIAGNOSIS — G89.29 CHRONIC RIGHT-SIDED THORACIC BACK PAIN: ICD-10-CM

## 2021-01-13 PROCEDURE — 97140 MANUAL THERAPY 1/> REGIONS: CPT | Performed by: PHYSICAL THERAPIST

## 2021-01-13 PROCEDURE — 97112 NEUROMUSCULAR REEDUCATION: CPT | Performed by: PHYSICAL THERAPIST

## 2021-01-13 PROCEDURE — 97110 THERAPEUTIC EXERCISES: CPT | Performed by: PHYSICAL THERAPIST

## 2021-01-13 NOTE — PROGRESS NOTES
Daily Note     Today's date: 2021  Patient name: Roxy Katz  : 1985  MRN: [id number]  Referring provider: Patria Pickering DO  Dx:   Encounter Diagnosis     ICD-10-CM    1  Cervical radiculopathy  M54 12    2  Chronic right-sided thoracic back pain  M54 6     G89 29                   Subjective: Pt presents today stating feeling better  Shoulder blade pain less intense, neck feeling better, has purchased supportive bra wear to help  Objective: See treatment diary below      Assessment: Proceeded with outlined activities  Demonstrating excellent recall and autonomy with HEP  Continue to progress with resistance training as able  Precautions: Nil      Manuals            Supine PA C2-7  G2-4  5           Prone TS - G2-4  5                                     Neuro Re-Ed             Postural Ed 10 min            Tband Row + Ext   G/RTB          Tband ER   RTB                                                              Ther Ex             CS retraction 4 x 10 4 x 10           CS retraction OP progression? Scap Add 10" x 10 10" x 10            Scaption  2 x 10            Supine CS retraction  2 x 10           SA punches  2 x 10           TS + CS chair bolster Ext   6" 2  x10                        Ther Activity             OH lifting 2-5#                          Gait Training                                       Modalities             Prone HP to TS/CS prn  10 min prone to TS

## 2021-01-19 ENCOUNTER — OFFICE VISIT (OUTPATIENT)
Dept: PHYSICAL THERAPY | Facility: CLINIC | Age: 36
End: 2021-01-19
Payer: COMMERCIAL

## 2021-01-19 DIAGNOSIS — M54.12 CERVICAL RADICULOPATHY: Primary | ICD-10-CM

## 2021-01-19 DIAGNOSIS — M54.6 CHRONIC RIGHT-SIDED THORACIC BACK PAIN: ICD-10-CM

## 2021-01-19 DIAGNOSIS — G89.29 CHRONIC RIGHT-SIDED THORACIC BACK PAIN: ICD-10-CM

## 2021-01-19 PROCEDURE — 97110 THERAPEUTIC EXERCISES: CPT

## 2021-01-19 PROCEDURE — 97112 NEUROMUSCULAR REEDUCATION: CPT

## 2021-01-19 NOTE — PROGRESS NOTES
Daily Note     Today's date: 2021  Patient name: Kathy Andino  : 1985  MRN: [id number]  Referring provider: Georgina Lea DO  Dx:   Encounter Diagnosis     ICD-10-CM    1  Cervical radiculopathy  M54 12    2  Chronic right-sided thoracic back pain  M54 6     G89 29                   Subjective: Pt reports soreness prior to session due to getting acupuncture yesterday  Objective: See treatment diary below      Assessment: Pt able to progress with exercises today, was given tband for HEP, demo good understanding  Precautions: Nil      Manuals           Supine PA C2-7  G2-4  5           Prone TS 1- G2-4  5                                     Neuro Re-Ed             Postural Ed 10 min            Tband Row + Ext   GTB/RTB 2x10          Tband ER   RTB 20x          Pallof Press   RTB 10x ea          Quadreped alt UE/LE   20x          Debora pose    L 10"10x          TA PB press   5"20x          Ther Ex             CS retraction 4 x 10 4 x 10 4x10          CS retraction OP progression? Scap Add 10" x 10 10" x 10  10" x 10           Scaption  2 x 10  2x10          Supine CS retraction  2 x 10           SA punches  2 x 10 2x10          TS + CS chair bolster Ext   6" 2  x10 6" 2x10          Scap ret   5"20x          Ther Activity             OH lifting 2-5#                          Gait Training                                       Modalities             Prone HP to TS/CS prn  10 min prone to TS   Home

## 2021-01-20 ENCOUNTER — OFFICE VISIT (OUTPATIENT)
Dept: OBGYN CLINIC | Facility: CLINIC | Age: 36
End: 2021-01-20
Payer: COMMERCIAL

## 2021-01-20 VITALS
BODY MASS INDEX: 24.62 KG/M2 | SYSTOLIC BLOOD PRESSURE: 140 MMHG | HEIGHT: 62 IN | WEIGHT: 133.8 LBS | DIASTOLIC BLOOD PRESSURE: 80 MMHG

## 2021-01-20 DIAGNOSIS — Z01.419 ENCOUNTER FOR ANNUAL ROUTINE GYNECOLOGICAL EXAMINATION: Primary | ICD-10-CM

## 2021-01-20 PROCEDURE — 99395 PREV VISIT EST AGE 18-39: CPT | Performed by: OBSTETRICS & GYNECOLOGY

## 2021-01-20 NOTE — PROGRESS NOTES
The patient is here for a yearly  She is new to our office  The patient is not due for a pap smear  pap normal HPV neg, Gc/chlamydia neg 1/15/19, pap normal 2/13/18  The patient transferred from Dr Spring Jiang office  The patient has regular periods  The patient has back pain on her right side on and off since 8/2019  She also has a tingling sensations  No bladder pain  The patient has pain beneath her right breast  The patient feels a lump beneath her breast      No bowel or vaginal problems

## 2021-01-20 NOTE — PROGRESS NOTES
Assessment/Plan:  Normal breast and gyn exam  History of right lower breast pain  Normal mammogram 2020  History of LEEP procedure   Normal Pap smear with a negative HPV 11/19  History of postpartum hemorrhage  Plans to conceive this year  Received COVID vaccine x2    Plan:  Continue healthy diet and exercise  Recommend prenatal vitamin extra folic acid and vitamin D3  Attempt to get a bra properly fitted  Try to conceive  Subjective:      Patient ID: Miriam Whitman is a 38 y o  female presents to the office as a new patient to our practice for yearly examination  Patient has had right breast pain at 6:00 a m  [facility] Patient recently got a new bra a with less discomfort  Patient had a normal mammogram 2020  This mammogram did not show any abnormalities when targeted in the area of concern  Patient has regular menstrual cycles monthly and denies any pelvic pain  Patient had a spontaneous vaginal every November 15, 2019  This was followed by postpartum hemorrhage with blood loss estimated at 1625cc  Her hemorrhage was controlled with IV Pitocin Methergine Cytotec and resolved 2 minutes following a dose of TXA  Her post partum course was unremarkable  Patient states that she has had persistent right upper mid back pain since her delivery and is now undergoing physical therapy with some relief of her discomfort  Patient did have an epidural at the time of delivery  Patient had a LEEP procedure  followed by normal Pap smears  Her last Pap smear in  was normal with negative HPV  Review of Systems   Constitutional: Negative  Negative for fatigue, fever and unexpected weight change  HENT: Negative  Eyes: Negative  Respiratory: Negative  Negative for chest tightness, shortness of breath, wheezing and stridor  Cardiovascular: Negative  Negative for chest pain, palpitations and leg swelling  Gastrointestinal: Negative    Negative for abdominal pain, blood in stool, diarrhea, nausea, rectal pain and vomiting  Endocrine: Negative  Genitourinary: Negative for dysuria, frequency, vaginal bleeding, vaginal discharge and vaginal pain  Occasional right breast pain at 6:00 a m  Musculoskeletal: Positive for back pain  Skin: Negative  Allergic/Immunologic: Negative  Neurological: Negative  Hematological: Negative  Psychiatric/Behavioral: Negative  All other systems reviewed and are negative  Objective:      /80 (BP Location: Left arm, Patient Position: Sitting, Cuff Size: Standard)   Ht 5' 2" (1 575 m)   Wt 60 7 kg (133 lb 12 8 oz)   LMP 01/07/2021 (Exact Date)   BMI 24 47 kg/m²          Physical Exam  Constitutional:       Appearance: She is well-developed  HENT:      Head: Normocephalic and atraumatic  Neck:      Musculoskeletal: Normal range of motion and neck supple  Thyroid: No thyromegaly  Trachea: No tracheal deviation  Cardiovascular:      Rate and Rhythm: Normal rate and regular rhythm  Heart sounds: Normal heart sounds  Pulmonary:      Effort: Pulmonary effort is normal  No respiratory distress  Breath sounds: Normal breath sounds  No stridor  No wheezing or rales  Chest:      Chest wall: No tenderness  Breasts: Breasts are symmetrical          Right: No inverted nipple, mass, nipple discharge, skin change or tenderness  Left: No inverted nipple, mass, nipple discharge, skin change or tenderness  Comments: Area a breast concern  No skin changes no masses no axillary nodes no nipple discharge  Abdominal:      General: Bowel sounds are normal  There is no distension  Palpations: Abdomen is soft  There is no mass  Tenderness: There is no abdominal tenderness  There is no guarding or rebound  Hernia: No hernia is present  There is no hernia in the left inguinal area     Genitourinary:     Labia:         Right: No rash, tenderness, lesion or injury  Left: No rash, tenderness, lesion or injury  Vagina: Normal  No signs of injury and foreign body  No vaginal discharge, erythema, tenderness or bleeding  Cervix: No cervical motion tenderness, discharge or friability  Uterus: Not deviated, not enlarged, not fixed and not tender  Adnexa:         Right: No mass, tenderness or fullness  Left: No mass, tenderness or fullness  Rectum: No mass, anal fissure, external hemorrhoid or internal hemorrhoid  Comments: Normal urethra  Normal Dougherty's glands  No uterine prolapse  No cystocele or rectocele  Lymphadenopathy:      Lower Body: No right inguinal adenopathy  No left inguinal adenopathy  Skin:     General: Skin is warm and dry  Neurological:      Mental Status: She is alert and oriented to person, place, and time  Psychiatric:         Behavior: Behavior normal          Thought Content:  Thought content normal          Judgment: Judgment normal

## 2021-01-21 ENCOUNTER — OFFICE VISIT (OUTPATIENT)
Dept: PHYSICAL THERAPY | Facility: CLINIC | Age: 36
End: 2021-01-21
Payer: COMMERCIAL

## 2021-01-21 DIAGNOSIS — M54.6 CHRONIC RIGHT-SIDED THORACIC BACK PAIN: ICD-10-CM

## 2021-01-21 DIAGNOSIS — M54.12 CERVICAL RADICULOPATHY: Primary | ICD-10-CM

## 2021-01-21 DIAGNOSIS — G89.29 CHRONIC RIGHT-SIDED THORACIC BACK PAIN: ICD-10-CM

## 2021-01-21 PROCEDURE — 97110 THERAPEUTIC EXERCISES: CPT

## 2021-01-21 PROCEDURE — 97140 MANUAL THERAPY 1/> REGIONS: CPT | Performed by: PHYSICAL THERAPIST

## 2021-01-21 PROCEDURE — 97112 NEUROMUSCULAR REEDUCATION: CPT

## 2021-01-21 NOTE — PROGRESS NOTES
Daily Note     Today's date: 2021  Patient name: Vinny Sagastume  : 1985  MRN: [id number]  Referring provider: Sanjiv Ruiz DO  Dx:   Encounter Diagnosis     ICD-10-CM    1  Cervical radiculopathy  M54 12    2  Chronic right-sided thoracic back pain  M54 6     G89 29                   Subjective: patient denies a change in symptoms since last treatment session  Remain sore from a combination of PT and acupuncture  Objective: See treatment diary below      Assessment: Tolerated treatment fair  Patient exhibited good technique with therapeutic exercises  Updated HEP and issued Tbands  [phone]   Plan: Continue per plan of care  Precautions: Nil      Manuals          Supine PA C2-7  G2-4  5  5- TS         Prone TS - G2-4  5  5- TS                                   Neuro Re-Ed             Postural Ed 10 min            Tband Row + Ext   GTB/RTB 2x10 GTB 2 x 10          Tband ER   RTB 20x RTBx 10          Pallof Press   RTB 10x ea RTB 2 x 10          Quadreped alt UE/LE   20x          Debora pose    L 10"10x          TA PB press   5"20x 5" x 20         Ther Ex             CS retraction 4 x 10 4 x 10 4x10 4 x 10          CS retraction OP progression? Scap Add 10" x 10 10" x 10  10" x 10  10" x 10          Scaption  2 x 10  2x10 2 x 10          Supine CS retraction  2 x 10  2 x 10          SA punches  2 x 10 2x10 2 x 10          TS + CS chair bolster Ext   6" 2  x10 6" 2x10 6" x 20          Scap ret   5"20x          Ther Activity             OH lifting 2-5#                          Gait Training                                       Modalities             Prone HP to TS/CS prn  10 min prone to TS   Home home

## 2021-01-26 ENCOUNTER — APPOINTMENT (EMERGENCY)
Dept: RADIOLOGY | Facility: HOSPITAL | Age: 36
End: 2021-01-26
Payer: COMMERCIAL

## 2021-01-26 ENCOUNTER — HOSPITAL ENCOUNTER (EMERGENCY)
Facility: HOSPITAL | Age: 36
Discharge: HOME | End: 2021-01-26
Attending: EMERGENCY MEDICINE | Admitting: EMERGENCY MEDICINE
Payer: COMMERCIAL

## 2021-01-26 ENCOUNTER — TELEPHONE (OUTPATIENT)
Dept: OBGYN CLINIC | Facility: CLINIC | Age: 36
End: 2021-01-26

## 2021-01-26 VITALS
BODY MASS INDEX: 24.33 KG/M2 | WEIGHT: 133 LBS | TEMPERATURE: 98.1 F | SYSTOLIC BLOOD PRESSURE: 123 MMHG | DIASTOLIC BLOOD PRESSURE: 84 MMHG | OXYGEN SATURATION: 97 % | HEART RATE: 93 BPM | RESPIRATION RATE: 18 BRPM

## 2021-01-26 DIAGNOSIS — R10.9 ABDOMINAL PAIN: Primary | ICD-10-CM

## 2021-01-26 DIAGNOSIS — R11.0 NAUSEA: ICD-10-CM

## 2021-01-26 DIAGNOSIS — M54.9 BACK PAIN: ICD-10-CM

## 2021-01-26 LAB
ALBUMIN SERPL BCP-MCNC: 4.4 G/DL (ref 3.5–5)
ALP SERPL-CCNC: 68 U/L (ref 46–116)
ALT SERPL W P-5'-P-CCNC: 20 U/L (ref 12–78)
ANION GAP SERPL CALCULATED.3IONS-SCNC: 2 MMOL/L (ref 4–13)
AST SERPL W P-5'-P-CCNC: 13 U/L (ref 5–45)
BACTERIA UR QL AUTO: ABNORMAL /HPF
BASOPHILS # BLD AUTO: 0.03 THOUSANDS/ΜL (ref 0–0.1)
BASOPHILS NFR BLD AUTO: 0 % (ref 0–1)
BILIRUB SERPL-MCNC: 0.26 MG/DL (ref 0.2–1)
BILIRUB UR QL STRIP: NEGATIVE
BUN SERPL-MCNC: 4 MG/DL (ref 5–25)
CALCIUM SERPL-MCNC: 9.4 MG/DL (ref 8.3–10.1)
CHLORIDE SERPL-SCNC: 103 MMOL/L (ref 100–108)
CLARITY UR: CLEAR
CO2 SERPL-SCNC: 34 MMOL/L (ref 21–32)
COLOR UR: YELLOW
CREAT SERPL-MCNC: 0.52 MG/DL (ref 0.6–1.3)
EOSINOPHIL # BLD AUTO: 0.18 THOUSAND/ΜL (ref 0–0.61)
EOSINOPHIL NFR BLD AUTO: 2 % (ref 0–6)
ERYTHROCYTE [DISTWIDTH] IN BLOOD BY AUTOMATED COUNT: 12 % (ref 11.6–15.1)
EXT PREG TEST URINE: NEGATIVE
EXT. CONTROL ED NAV: NORMAL
GFR SERPL CREATININE-BSD FRML MDRD: 123 ML/MIN/1.73SQ M
GLUCOSE SERPL-MCNC: 105 MG/DL (ref 65–140)
GLUCOSE UR STRIP-MCNC: NEGATIVE MG/DL
HCT VFR BLD AUTO: 42.3 % (ref 34.8–46.1)
HGB BLD-MCNC: 13.7 G/DL (ref 11.5–15.4)
HGB UR QL STRIP.AUTO: NEGATIVE
HYALINE CASTS #/AREA URNS LPF: ABNORMAL /LPF
IMM GRANULOCYTES # BLD AUTO: 0.03 THOUSAND/UL (ref 0–0.2)
IMM GRANULOCYTES NFR BLD AUTO: 0 % (ref 0–2)
KETONES UR STRIP-MCNC: NEGATIVE MG/DL
LEUKOCYTE ESTERASE UR QL STRIP: ABNORMAL
LIPASE SERPL-CCNC: 101 U/L (ref 73–393)
LYMPHOCYTES # BLD AUTO: 4.17 THOUSANDS/ΜL (ref 0.6–4.47)
LYMPHOCYTES NFR BLD AUTO: 41 % (ref 14–44)
MCH RBC QN AUTO: 30.6 PG (ref 26.8–34.3)
MCHC RBC AUTO-ENTMCNC: 32.4 G/DL (ref 31.4–37.4)
MCV RBC AUTO: 95 FL (ref 82–98)
MONOCYTES # BLD AUTO: 0.6 THOUSAND/ΜL (ref 0.17–1.22)
MONOCYTES NFR BLD AUTO: 6 % (ref 4–12)
NEUTROPHILS # BLD AUTO: 5.24 THOUSANDS/ΜL (ref 1.85–7.62)
NEUTS SEG NFR BLD AUTO: 51 % (ref 43–75)
NITRITE UR QL STRIP: NEGATIVE
NON-SQ EPI CELLS URNS QL MICRO: ABNORMAL /HPF
NRBC BLD AUTO-RTO: 0 /100 WBCS
PH UR STRIP.AUTO: 7.5 [PH] (ref 4.5–8)
PLATELET # BLD AUTO: 341 THOUSANDS/UL (ref 149–390)
PMV BLD AUTO: 9.4 FL (ref 8.9–12.7)
POTASSIUM SERPL-SCNC: 3.5 MMOL/L (ref 3.5–5.3)
PROT SERPL-MCNC: 7.9 G/DL (ref 6.4–8.2)
PROT UR STRIP-MCNC: NEGATIVE MG/DL
RBC # BLD AUTO: 4.47 MILLION/UL (ref 3.81–5.12)
RBC #/AREA URNS AUTO: ABNORMAL /HPF
SODIUM SERPL-SCNC: 139 MMOL/L (ref 136–145)
SP GR UR STRIP.AUTO: 1.01 (ref 1–1.03)
UROBILINOGEN UR QL STRIP.AUTO: 0.2 E.U./DL
WBC # BLD AUTO: 10.25 THOUSAND/UL (ref 4.31–10.16)
WBC #/AREA URNS AUTO: ABNORMAL /HPF

## 2021-01-26 PROCEDURE — 83690 ASSAY OF LIPASE: CPT | Performed by: EMERGENCY MEDICINE

## 2021-01-26 PROCEDURE — 99284 EMERGENCY DEPT VISIT MOD MDM: CPT | Performed by: EMERGENCY MEDICINE

## 2021-01-26 PROCEDURE — 99284 EMERGENCY DEPT VISIT MOD MDM: CPT

## 2021-01-26 PROCEDURE — 81025 URINE PREGNANCY TEST: CPT | Performed by: EMERGENCY MEDICINE

## 2021-01-26 PROCEDURE — G1004 CDSM NDSC: HCPCS

## 2021-01-26 PROCEDURE — 96374 THER/PROPH/DIAG INJ IV PUSH: CPT

## 2021-01-26 PROCEDURE — 85025 COMPLETE CBC W/AUTO DIFF WBC: CPT | Performed by: EMERGENCY MEDICINE

## 2021-01-26 PROCEDURE — 74177 CT ABD & PELVIS W/CONTRAST: CPT

## 2021-01-26 PROCEDURE — 81001 URINALYSIS AUTO W/SCOPE: CPT

## 2021-01-26 PROCEDURE — 80053 COMPREHEN METABOLIC PANEL: CPT | Performed by: EMERGENCY MEDICINE

## 2021-01-26 PROCEDURE — 36415 COLL VENOUS BLD VENIPUNCTURE: CPT | Performed by: EMERGENCY MEDICINE

## 2021-01-26 RX ORDER — DICYCLOMINE HCL 20 MG
20 TABLET ORAL 2 TIMES DAILY
Qty: 20 TABLET | Refills: 0 | Status: SHIPPED | OUTPATIENT
Start: 2021-01-26 | End: 2021-07-28

## 2021-01-26 RX ORDER — MAGNESIUM HYDROXIDE/ALUMINUM HYDROXICE/SIMETHICONE 120; 1200; 1200 MG/30ML; MG/30ML; MG/30ML
30 SUSPENSION ORAL ONCE
Status: COMPLETED | OUTPATIENT
Start: 2021-01-26 | End: 2021-01-26

## 2021-01-26 RX ORDER — ONDANSETRON 2 MG/ML
4 INJECTION INTRAMUSCULAR; INTRAVENOUS ONCE
Status: COMPLETED | OUTPATIENT
Start: 2021-01-26 | End: 2021-01-26

## 2021-01-26 RX ORDER — ONDANSETRON 4 MG/1
4 TABLET, ORALLY DISINTEGRATING ORAL EVERY 6 HOURS PRN
Qty: 20 TABLET | Refills: 0 | Status: SHIPPED | OUTPATIENT
Start: 2021-01-26 | End: 2021-07-28

## 2021-01-26 RX ADMIN — ONDANSETRON 4 MG: 2 INJECTION INTRAMUSCULAR; INTRAVENOUS at 18:06

## 2021-01-26 RX ADMIN — IOHEXOL 100 ML: 350 INJECTION, SOLUTION INTRAVENOUS at 18:53

## 2021-01-26 RX ADMIN — ALUMINUM HYDROXIDE, MAGNESIUM HYDROXIDE, AND SIMETHICONE 30 ML: 200; 200; 20 SUSPENSION ORAL at 18:06

## 2021-01-26 NOTE — ED ATTENDING ATTESTATION
1/26/2021  IArias MD, saw and evaluated the patient  I have discussed the patient with the resident/non-physician practitioner and agree with the resident's/non-physician practitioner's findings, Plan of Care, and MDM as documented in the resident's/non-physician practitioner's note, except where noted  All available labs and Radiology studies were reviewed  I was present for key portions of any procedure(s) performed by the resident/non-physician practitioner and I was immediately available to provide assistance  At this point I agree with the current assessment done in the Emergency Department    I have conducted an independent evaluation of this patient a history and physical is as follows:  Back and abd pain     Had RUQ US  Several weeks ago      Had UGI and colonoscopy for the symptoms  All unremarkable   Using ibuprophen   No fever no blood in stool    Nausea no vomiting     ED Course         Critical Care Time  Procedures

## 2021-01-26 NOTE — Clinical Note
Angela Sim was seen and treated in our emergency department on 1/26/2021  Diagnosis:     Koko Nye    She may return on this date: 01/28/2021         If you have any questions or concerns, please don't hesitate to call        Stephie Cassidy MD    ______________________________           _______________          _______________  Hospital Representative                              Date                                Time

## 2021-01-27 ENCOUNTER — APPOINTMENT (OUTPATIENT)
Dept: PHYSICAL THERAPY | Facility: CLINIC | Age: 36
End: 2021-01-27
Payer: COMMERCIAL

## 2021-01-27 NOTE — ED PROVIDER NOTES
Final Diagnosis:  1  Abdominal pain    2  Nausea    3  Back pain         Chief Complaint   Patient presents with    Abdominal Pain     pt reports mid abdominal pain that worsened over the last two weeks, worse when standing up, sometimes worse when she eats, pain wraps around to back, denies V/C; reprots some diarrhea and nausea        ASSESSMENT + PLAN:   - Nursing note reviewed  1  Abdominal pain  -patient with negative right upper quadrant ultrasound, endoscopy, colonoscopy  -tender mid abdomen to left upper quadrant  -will obtain CT abdomen pelvis, UA, basic labs to evaluate for metabolic derangement  -IV antiemetics and Maalox  -negative workup  -well the patient follow-up with her primary care doctor and likely GI      Final Dispo   Patient was reassessed  Vital signs stable  Patient and/or family given discharge instructions and return precautions  Patient and/or family was reassured  The patient and/or family vocalizes understanding  Answered all of the patient's and/or family's questions  Will follow up with PCP  Patient and/or family are agreeable to the plan  Medications   ondansetron (ZOFRAN) injection 4 mg (4 mg Intravenous Given 1/26/21 1806)   aluminum-magnesium hydroxide-simethicone (MYLANTA) oral suspension 30 mL (30 mL Oral Given 1/26/21 1806)   iohexol (OMNIPAQUE) 350 MG/ML injection (SINGLE-DOSE) 100 mL (100 mL Intravenous Given 1/26/21 1853)     Time reflects when diagnosis was documented in both MDM as applicable and the Disposition within this note     Time User Action Codes Description Comment    1/26/2021  7:25 PM Erenest Ringer Add [R10 9] Abdominal pain     1/26/2021  7:25 PM Erenest Ringer Add [R11 0] Nausea     1/26/2021  7:25 PM Erenest Ringer Add [M54 9] Back pain       ED Disposition     ED Disposition Condition Date/Time Comment    Discharge Stable Tue Jan 26, 2021  7:25 PM Janiya Reed discharge to home/self care              Follow-up Information     Follow up With Specialties Details Why Contact Info Additional [address] [facility],  Internal Medicine Call   [address]  2nd [facility] [address]       [facility] [place] [facility] Call   [address] [phone]  Priscila Beckvalerie Hortonm Darrian [address] [facility] [place], [place] [facility], [address], [place], [place], [address]        Discharge Medication List as of 1/26/2021  7:26 PM      START taking these medications    Details   dicyclomine (BENTYL) 20 mg tablet Take 1 tablet (20 mg total) by mouth 2 (two) times a day, Starting Tue 1/26/2021, Normal      ondansetron (ZOFRAN-ODT) 4 mg disintegrating tablet Take 1 tablet (4 mg total) by mouth every 6 (six) hours as needed for nausea or vomiting, Starting Tue 1/26/2021, Normal         CONTINUE these medications which have NOT CHANGED    Details   B Complex Vitamins (B COMPLEX-B12 PO) Take by mouth daily, Historical Med      Calcium-Magnesium-Vitamin D 185- MG-MG-UNIT CAPS Take by mouth, Historical Med      Cholecalciferol (VITAMIN D3) 2000 units capsule Take 2,000 Units by mouth daily, Historical Med      Docosahexaenoic Acid (DHA OMEGA 3 PO) Take by mouth, Historical Med      ibuprofen (MOTRIN) 600 mg tablet Take 1 tablet (600 mg total) by mouth every 6 (six) hours as needed for mild pain or moderate pain (cramping), Starting Tue 8/20/2019, Print      Multiple Vitamins-Minerals (MULTIVITAMIN GUMMIES ADULTS PO) Take by mouth daily, Historical Med      omeprazole (PriLOSEC) 20 mg delayed release capsule Take 1 capsule (20 mg total) by mouth daily, Starting Sat 1/9/2021, Normal           No discharge procedures on file  Prior to Admission Medications   Prescriptions Last Dose Informant Patient Reported? Taking?    B Complex Vitamins (B COMPLEX-B12 PO)  Self Yes No   Sig: Take by mouth daily   Calcium-Magnesium-Vitamin D 185- MG-MG-UNIT CAPS Self Yes No   Sig: Take by mouth   Cholecalciferol (VITAMIN D3) 2000 units capsule  Self Yes No   Sig: Take 2,000 Units by mouth daily   Docosahexaenoic Acid (DHA OMEGA 3 PO)  Self Yes No   Sig: Take by mouth   Multiple Vitamins-Minerals (MULTIVITAMIN GUMMIES ADULTS PO)  Self Yes No   Sig: Take by mouth daily   ibuprofen (MOTRIN) 600 mg tablet   No No   Sig: Take 1 tablet (600 mg total) by mouth every 6 (six) hours as needed for mild pain or moderate pain (cramping)   Patient not taking: Reported on 8/27/2019   ibuprofen (MOTRIN) 800 mg tablet   No No   Sig: Take 1 tablet (800 mg total) by mouth daily for 10 doses   omeprazole (PriLOSEC) 20 mg delayed release capsule   No No   Sig: Take 1 capsule (20 mg total) by mouth daily   Patient not taking: Reported on 1/20/2021      Facility-Administered Medications: None       History of Present Illness: This is a 38 y o  female presenting today for evaluation of epigastric abdominal pain for approximately a month at this point  Patient says that she started to have thoracic back pain approximately 3 months ago  She says that she has been doing physical therapy and acupuncture without any relief  She says about a month ago, she started to have mid epigastric abdominal pain  She says this has no association with eating  She does have some associated nausea, but no vomiting  No diarrhea at this time, but was having diarrhea several weeks ago  She had a negative endoscopy and colonoscopy a few weeks ago  No history of any abdominal surgeries  No dysuria or vaginal bleeding  Patient's last menstrual period was at the beginning of this month  - No language barrier    - History obtained from patient and chart   - There are no limitations to the history obtained  - Previous charting was reviewed  Some data reviewed included below for ease of access whether or not it is relevant to this patient encounter        Past Medical, Past Surgical History:    has a past medical history of Abnormal Pap smear of cervix, Back pain, GERD (gastroesophageal reflux disease), Helicobacter pylori (H  pylori) infection, and Varicella  has a past surgical history that includes pr esophagogastroduodenoscopy transoral diagnostic (N/A, 2016); Cervical biopsy w/ loop electrode excision; and [place] tooth extraction (Bilateral, )  Allergies: Allergies   Allergen Reactions    Meloxicam Nausea Only and Headache       Social and Family History:     Social History     Substance and Sexual Activity   Alcohol Use Not Currently    Frequency: 2-3 times a week    Drinks per session: 1 or 2    Binge frequency: Never    Comment: social     Social History     Tobacco Use   Smoking Status Former Smoker    Packs/day: 0 25    Years: 8 00    Pack years: 2 00    Types: Cigarettes    Quit date: 2017    Years since quittin 0   Smokeless Tobacco Never Used     Social History     Substance and Sexual Activity   Drug Use No       Review of Systems:   Review of Systems   Constitutional: Negative for chills and fever  HENT: Negative for ear pain and sore throat  Eyes: Negative for pain and visual disturbance  Respiratory: Negative for cough and shortness of breath  Cardiovascular: Negative for chest pain and palpitations  Gastrointestinal: Positive for abdominal pain and nausea  Negative for vomiting  Genitourinary: Negative for dysuria and hematuria  Musculoskeletal: Negative for arthralgias and back pain  Skin: Negative for color change and rash  Neurological: Negative for seizures and syncope  All other systems reviewed and are negative  Physical Examination     Vitals:    21 1610   BP: 123/84   Pulse: 93   Resp: 18   Temp: 98 1 °F (36 7 °C)   TempSrc: Oral   SpO2: 97%   Weight: 60 3 kg (133 lb)     Vitals reviewed by me  Physical Exam  Vitals signs and nursing note reviewed  Constitutional:       General: She is not in acute distress  Appearance: She is well-developed  She is not ill-appearing  HENT:      Head: Normocephalic and atraumatic  Eyes:      Conjunctiva/sclera: Conjunctivae normal    Neck:      Musculoskeletal: Normal range of motion and neck supple  Cardiovascular:      Rate and Rhythm: Normal rate and regular rhythm  Pulmonary:      Effort: Pulmonary effort is normal       Breath sounds: Normal breath sounds  Abdominal:      General: There is no distension  Palpations: Abdomen is soft  Tenderness: There is generalized abdominal tenderness  There is no guarding or rebound  Musculoskeletal: Normal range of motion  Skin:     General: Skin is warm and dry  Neurological:      Mental Status: She is alert and oriented to person, place, and time  Comments: Grossly neuro intact; Able to move all extremities                              CT abdomen pelvis with contrast   Final Result      No acute abnormality identified  No abnormal mass identified  Workstation performed: [id number]           Orders Placed This Encounter   Procedures    CT abdomen pelvis with contrast    Comprehensive metabolic panel    CBC and differential    Lipase    Urine Microscopic    POCT pregnancy, urine       Labs:     Labs Reviewed   COMPREHENSIVE METABOLIC PANEL - Abnormal       Result Value Ref Range Status    Sodium 139  136 - 145 mmol/L Final    Potassium 3 5  3 5 - 5 3 mmol/L Final    Chloride 103  100 - 108 mmol/L Final    CO2 34 (*) 21 - 32 mmol/L Final    ANION GAP 2 (*) 4 - 13 mmol/L Final    BUN 4 (*) 5 - 25 mg/dL Final    Creatinine 0 52 (*) 0 60 - 1 30 mg/dL Final    Comment: Standardized to IDMS reference method    Glucose 105  65 - 140 mg/dL Final    Comment: If the patient is fasting, the ADA then defines impaired fasting glucose as > 100 mg/dL and diabetes as > or equal to 123 mg/dL    Specimen collection should occur prior to Sulfasalazine administration due to the potential for falsely depressed results  Specimen collection should occur prior to Sulfapyridine administration due to the potential for falsely elevated results  Calcium 9 4  8 3 - 10 1 mg/dL Final    AST 13  5 - 45 U/L Final    Comment: Specimen collection should occur prior to Sulfasalazine administration due to the potential for falsely depressed results  ALT 20  12 - 78 U/L Final    Comment: Specimen collection should occur prior to Sulfasalazine and/or Sulfapyridine administration due to the potential for falsely depressed results  Alkaline Phosphatase 68  46 - 116 U/L Final    Total Protein 7 9  6 4 - 8 2 g/dL Final    Albumin 4 4  3 5 - 5 0 g/dL Final    Total Bilirubin 0 26  0 20 - 1 00 mg/dL Final    Comment: Use of this assay is not recommended for patients undergoing treatment with eltrombopag due to the potential for falsely elevated results      eGFR 123  ml/min/1 73sq m Final    Narrative:     National Kidney Disease Foundation guidelines for Chronic Kidney Disease (CKD):     Stage 1 with normal or high GFR (GFR > 90 mL/min/1 73 square meters)    Stage 2 Mild CKD (GFR = 60-89 mL/min/1 73 square meters)    Stage 3A Moderate CKD (GFR = 45-59 mL/min/1 73 square meters)    Stage 3B Moderate CKD (GFR = 30-44 mL/min/1 73 square meters)    Stage 4 Severe CKD (GFR = 15-29 mL/min/1 73 square meters)    Stage 5 End Stage CKD (GFR <15 mL/min/1 73 square meters)  Note: GFR calculation is accurate only with a steady state creatinine   CBC AND DIFFERENTIAL - Abnormal    WBC 10 25 (*) 4 31 - 10 16 Thousand/uL Final    RBC 4 47  3 81 - 5 12 Million/uL Final    Hemoglobin 13 7  11 5 - 15 4 g/dL Final    Hematocrit 42 3  34 8 - 46 1 % Final    MCV 95  82 - 98 fL Final    MCH 30 6  26 8 - 34 3 pg Final    MCHC 32 4  31 4 - 37 4 g/dL Final    RDW 12 0  11 6 - 15 1 % Final    MPV 9 4  8 9 - 12 7 fL Final    Platelets 612  550 - 390 Thousands/uL Final    nRBC 0  /100 WBCs Final    Neutrophils Relative 51  43 - 75 % Final    Immat GRANS % 0  0 - 2 % Final    Lymphocytes Relative 41  14 - 44 % Final    Monocytes Relative 6  4 - 12 % Final    Eosinophils Relative 2  0 - 6 % Final    Basophils Relative 0  0 - 1 % Final    Neutrophils Absolute 5 24  1 85 - 7 62 Thousands/µL Final    Immature Grans Absolute 0 03  0 00 - 0 20 Thousand/uL Final    Lymphocytes Absolute 4 17  0 60 - 4 47 Thousands/µL Final    Monocytes Absolute 0 60  0 17 - 1 22 Thousand/µL Final    Eosinophils Absolute 0 18  0 00 - 0 61 Thousand/µL Final    Basophils Absolute 0 03  0 00 - 0 10 Thousands/µL Final   URINE MICROSCOPIC - Abnormal    RBC, UA None Seen  None Seen, 2-4 /hpf Final    WBC, UA 4-10 (*) None Seen, 2-4 /hpf Final    Epithelial Cells Moderate (*) None Seen, Occasional /hpf Final    Bacteria, UA Occasional  None Seen, Occasional /hpf Final    Hyaline Casts, UA None Seen  None Seen /lpf Final   URINE MACROSCOPIC, POC - Abnormal    Color, UA Yellow   Final    Clarity, UA Clear   Final    pH, UA 7 5  4 5 - 8 0 Final    Leukocytes, UA Small (*) Negative Final    Nitrite, UA Negative  Negative Final    Protein, UA Negative  Negative mg/dl Final    Glucose, UA Negative  Negative mg/dl Final    Ketones, UA Negative  Negative mg/dl Final    Urobilinogen, UA 0 2  0 2, 1 0 E U /dl E U /dl Final    Bilirubin, UA Negative  Negative Final    Blood, UA Negative  Negative Final    Specific Lambert, UA 1 015  1 003 - 1 030 Final    Narrative:     CLINITEK RESULT   LIPASE - Normal    Lipase 101  73 - 393 u/L Final   POCT PREGNANCY, URINE - Normal    EXT PREG TEST UR (Ref: Negative) negative   Final    Control valid   Final       Imaging:   Xr Chest 2 Views    Result Date: 1/9/2021  Narrative: CHEST INDICATION:   abd and chest pain  COMPARISON:  Chest x-ray dated 10/21/2020 EXAM PERFORMED/VIEWS:  XR CHEST PA & LATERAL FINDINGS: No pneumothorax is seen and the lungs appear grossly clear  The cardiomediastinal silhouette appears unremarkable  The visualized bones appear intact  Impression: No acute cardiopulmonary disease is seen  Workstation performed: [id number]     Us Right Upper Quadrant    Result Date: 1/9/2021  Narrative: RIGHT UPPER QUADRANT ULTRASOUND INDICATION:     abd pain  COMPARISON:  Abdominal ultrasound dated 5/11/2017  TECHNIQUE:   Real-time ultrasound of the right upper quadrant was performed with a curvilinear transducer with both volumetric sweeps and still imaging techniques  FINDINGS: PANCREAS:  Visualized portions of the pancreas are within normal limits  AORTA AND IVC:  Visualized portions are normal for patient age  LIVER: Size:  Within normal range  The liver measures 15 6 cm in the midclavicular line  Contour:  Surface contour is smooth  Parenchyma:  Echogenicity and echotexture are within normal limits  No evidence of suspicious mass  Limited imaging of the main portal vein shows it to be patent and hepatopetal   BILIARY: The gallbladder is normal in caliber  No wall thickening or pericholecystic fluid  No stones or sludge identified  No sonographic Vincent's sign  No intrahepatic biliary dilatation  CBD measures 2 mm  No choledocholithiasis  KIDNEY: Right kidney measures 10 6 x 5 3 x 4 7 cm  Within normal limits  ASCITES:   None  Impression: No sonographic evidence of acute cholecystitis  Negative sonographic Vincent's  Workstation performed: [id number]     Ct Abdomen Pelvis With Contrast    Result Date: 1/26/2021  Narrative: CT ABDOMEN AND PELVIS WITH IV CONTRAST INDICATION:   LUQ abdominal pain abdominal pain  COMPARISON:  Multiple priors, most recently ultrasound 1/8/2021 TECHNIQUE:  CT examination of the abdomen and pelvis was performed  Axial, sagittal, and coronal 2D reformatted images were created from the source data and submitted for interpretation  Radiation dose length product (DLP) for this visit:  288 8 mGy-cm     This examination, like all CT scans performed in the [facility], was performed utilizing techniques to minimize radiation dose exposure, including the use of iterative reconstruction and automated exposure control  IV Contrast:  100 mL of iohexol (OMNIPAQUE) Enteric Contrast:  Enteric contrast was not administered  FINDINGS: ABDOMEN LOWER CHEST:  No clinically significant abnormality identified in the visualized lower chest  LIVER/BILIARY TREE:  Unremarkable  GALLBLADDER:  No calcified gallstones  No pericholecystic inflammatory change  SPLEEN:  Unremarkable  PANCREAS:  Unremarkable  ADRENAL GLANDS:  Unremarkable  KIDNEYS/URETERS:  Unremarkable  No hydronephrosis  STOMACH AND BOWEL:  Unremarkable  APPENDIX:  A normal appendix was visualized  ABDOMINOPELVIC CAVITY:  No ascites  No pneumoperitoneum  No lymphadenopathy  VESSELS:  Unremarkable for patient's age  PELVIS REPRODUCTIVE ORGANS:  Unremarkable for patient's age  URINARY BLADDER:  Unremarkable  ABDOMINAL WALL/INGUINAL REGIONS:  Unremarkable  OSSEOUS STRUCTURES:  No acute fracture or destructive osseous lesion  Impression: No acute abnormality identified  No abnormal mass identified  Workstation performed: [id number]        Final Diagnosis:  1  Abdominal pain    2  Nausea    3  Back pain        Code Status: Prior    Portions of the record may have been created with voice recognition software  Occasional wrong word or "sound a like" substitutions may have occurred due to the inherent limitations of voice recognition software  Read the chart carefully and recognize, using context, where substitutions have occurred      Electronically signed by:  José Antonio Cyr, PGY 2, MD Keren Riggins MD  01/26/21 4430

## 2021-01-27 NOTE — DISCHARGE INSTRUCTIONS
Patient Instructions: Today you were seen in the emergency department for abdominal pain and nausea  We reviewed your CT and your labs and determined that you would be able to be discharged  You should take bentyl for abdominal pain and zofran for nausea  You should follow up with your PCP and GI  Please return to the emergency department if your symptoms get worse, you develop a fever, or you have any other symptoms we discussed today prior to discharge  Nice to meet you! Best of luck with everything!

## 2021-01-28 ENCOUNTER — OFFICE VISIT (OUTPATIENT)
Dept: PHYSICAL THERAPY | Facility: CLINIC | Age: 36
End: 2021-01-28
Payer: COMMERCIAL

## 2021-01-28 DIAGNOSIS — M54.12 CERVICAL RADICULOPATHY: Primary | ICD-10-CM

## 2021-01-28 DIAGNOSIS — M54.6 CHRONIC RIGHT-SIDED THORACIC BACK PAIN: ICD-10-CM

## 2021-01-28 DIAGNOSIS — G89.29 CHRONIC RIGHT-SIDED THORACIC BACK PAIN: ICD-10-CM

## 2021-01-28 PROCEDURE — 97112 NEUROMUSCULAR REEDUCATION: CPT | Performed by: PHYSICAL THERAPIST

## 2021-01-28 PROCEDURE — 97140 MANUAL THERAPY 1/> REGIONS: CPT | Performed by: PHYSICAL THERAPIST

## 2021-01-28 PROCEDURE — 97010 HOT OR COLD PACKS THERAPY: CPT | Performed by: PHYSICAL THERAPIST

## 2021-01-28 NOTE — PROGRESS NOTES
Daily Note     Today's date: 2021  Patient name: Ahmet Powers  : 1985  MRN: [id number]  Referring provider: Diego Singer DO  Dx:   Encounter Diagnosis     ICD-10-CM    1  Cervical radiculopathy  M54 12    2  Chronic right-sided thoracic back pain  M54 6     G89 29                   Subjective: Pt presents today stating that her neck and shoulder blade are doing well  States that intensity level is improving  Objective: See treatment diary below      Assessment: Able to enhance reps and resistance today with mild failure of posture  Continue to progress as able  Plan: Continue per plan of care  Precautions: Nil      Manuals         Supine PA C2-7  G2-4  5  5- TS 5        Prone TS 1- G2-4  5  5- TS 5                                  Neuro Re-Ed             Postural Ed 10 min            Tband Row + Ext   GTB/RTB 2x10 GTB 2 x 10  GTB 3 x 10        Tband ER   RTB 20x RTBx 10  GTB 3 x 10        Pallof Press   RTB 10x ea RTB 2 x 10          Quadreped alt UE/LE   20x          Debora pose    L 10"10x          TA PB press   5"20x 5" x 20 5" x 20        Ther Ex             CS retraction 4 x 10 4 x 10 4x10 4 x 10  4 x 10         CS retraction OP progression? Scap Add 10" x 10 10" x 10  10" x 10  10" x 10  10" x 10        Scaption  2 x 10  2x10 2 x 10  2 x 10        Supine CS retraction  2 x 10  2 x 10  2 x 10        SA punches  2 x 10 2x10 2 x 10  2 x 10        TS + CS chair bolster Ext   6" 2  x10 6" 2x10 6" x 20  6" x 20                     Ther Activity             OH lifting 2-5#                          Gait Training                                       Modalities             Prone HP to TS/CS prn  10 min prone to TS  Home home HP x 10 prone

## 2021-02-11 ENCOUNTER — TRANSCRIBE ORDERS (OUTPATIENT)
Dept: OBGYN CLINIC | Facility: CLINIC | Age: 36
End: 2021-02-11

## 2021-02-11 DIAGNOSIS — Z32.00 ENCOUNTER FOR PREGNANCY TEST, RESULT UNKNOWN: Primary | ICD-10-CM

## 2021-02-17 ENCOUNTER — LAB (OUTPATIENT)
Dept: LAB | Facility: HOSPITAL | Age: 36
End: 2021-02-17
Payer: COMMERCIAL

## 2021-02-17 DIAGNOSIS — Z32.00 ENCOUNTER FOR PREGNANCY TEST, RESULT UNKNOWN: ICD-10-CM

## 2021-02-17 LAB — B-HCG SERPL-ACNC: 1245 MIU/ML

## 2021-02-17 PROCEDURE — 36415 COLL VENOUS BLD VENIPUNCTURE: CPT

## 2021-02-17 PROCEDURE — 84702 CHORIONIC GONADOTROPIN TEST: CPT

## 2021-02-19 ENCOUNTER — TELEPHONE (OUTPATIENT)
Dept: OBGYN CLINIC | Facility: CLINIC | Age: 36
End: 2021-02-19

## 2021-02-19 NOTE — TELEPHONE ENCOUNTER
Pt cld-LMP  1/7/2021 (positive pregnancy test)  On January 26 had a CT scan of abdomen for back pain and is concerned about the radiation

## 2021-02-22 ENCOUNTER — TRANSCRIBE ORDERS (OUTPATIENT)
Dept: RADIOLOGY | Facility: HOSPITAL | Age: 36
End: 2021-02-22

## 2021-02-22 ENCOUNTER — HOSPITAL ENCOUNTER (OUTPATIENT)
Dept: RADIOLOGY | Facility: HOSPITAL | Age: 36
Discharge: HOME | End: 2021-02-22
Attending: OBSTETRICS & GYNECOLOGY
Payer: COMMERCIAL

## 2021-02-22 DIAGNOSIS — O09.891 RADIATION EXPOSURE AFFECTING PREGNANCY IN FIRST TRIMESTER: ICD-10-CM

## 2021-02-22 DIAGNOSIS — O09.891 RADIATION EXPOSURE AFFECTING PREGNANCY IN FIRST TRIMESTER: Primary | ICD-10-CM

## 2021-02-22 PROCEDURE — 76815 OB US LIMITED FETUS(S): CPT

## 2021-02-22 NOTE — PROGRESS NOTES
PT Discharge    Today's date: 2021  Patient name: Vinny Sagastume  : 1985  MRN: [id number]  Referring provider: Sanjiv Ruiz DO  Dx:   Encounter Diagnosis     ICD-10-CM    1  Cervical radiculopathy  M54 12    2  Chronic right-sided thoracic back pain  M54 6     G89 29        Start Time: 1600  Stop Time: 1650  Total time in clinic (min): 50 minutes    Assessment/Plan  Pt has not been present since 2021  Pt's chart will be DC in compliance of facility policy as all Charts are DC within 30 days of last scheduled visit          Subjective    Objective    Flowsheet Rows      Most Recent Value   PT/OT G-Codes   Current Score  44   Projected Score  60

## 2021-03-11 ENCOUNTER — OFFICE VISIT (OUTPATIENT)
Dept: OBGYN CLINIC | Facility: CLINIC | Age: 36
End: 2021-03-11
Payer: COMMERCIAL

## 2021-03-11 VITALS
HEIGHT: 62 IN | DIASTOLIC BLOOD PRESSURE: 80 MMHG | WEIGHT: 144 LBS | BODY MASS INDEX: 26.5 KG/M2 | SYSTOLIC BLOOD PRESSURE: 120 MMHG

## 2021-03-11 DIAGNOSIS — Z36.89 ENCOUNTER FOR OTHER SPECIFIED ANTENATAL SCREENING: Primary | ICD-10-CM

## 2021-03-11 DIAGNOSIS — Z11.8 SCREENING FOR CHLAMYDIAL DISEASE: ICD-10-CM

## 2021-03-11 DIAGNOSIS — Z3A.01 7 WEEKS GESTATION OF PREGNANCY: ICD-10-CM

## 2021-03-11 DIAGNOSIS — Z11.3 SCREENING FOR STDS (SEXUALLY TRANSMITTED DISEASES): ICD-10-CM

## 2021-03-11 PROCEDURE — 76817 TRANSVAGINAL US OBSTETRIC: CPT | Performed by: OBSTETRICS & GYNECOLOGY

## 2021-03-11 PROCEDURE — 0500F INITIAL PRENATAL CARE VISIT: CPT | Performed by: OBSTETRICS & GYNECOLOGY

## 2021-03-11 NOTE — PROGRESS NOTES
Patient presents to the office with her  for ultrasound  Patient is pregnant and does not know her last menstrual period  Patient had a CT scan in the emergency room for abdominal pain on January 26, 2021  According to today's ultrasound she would have been approximately 1 week pregnant  Vaginal probe ultrasound:  Crown-rump length 7 weeks 4 days positive fetal heartbeat  Patient was reassured and was asked to come back in the office in 2 weeks for ultrasound to determine [facility]  Tentative EDC is October 24, 2021  Asked patient to continue her prenatal vitamins and vitamin-D 3  Request for prenatal blood work was given

## 2021-03-18 ENCOUNTER — APPOINTMENT (OUTPATIENT)
Dept: LAB | Facility: HOSPITAL | Age: 36
End: 2021-03-18
Attending: OBSTETRICS & GYNECOLOGY
Payer: COMMERCIAL

## 2021-03-18 DIAGNOSIS — Z36.89 ENCOUNTER FOR OTHER SPECIFIED ANTENATAL SCREENING: ICD-10-CM

## 2021-03-18 LAB
ABO GROUP BLD: NORMAL
BACTERIA UR QL AUTO: NORMAL /HPF
BASOPHILS # BLD AUTO: 0.03 THOUSANDS/ΜL (ref 0–0.1)
BASOPHILS NFR BLD AUTO: 0 % (ref 0–1)
BILIRUB UR QL STRIP: NEGATIVE
BLD GP AB SCN SERPL QL: NEGATIVE
CLARITY UR: CLEAR
COLOR UR: YELLOW
EOSINOPHIL # BLD AUTO: 0.15 THOUSAND/ΜL (ref 0–0.61)
EOSINOPHIL NFR BLD AUTO: 1 % (ref 0–6)
ERYTHROCYTE [DISTWIDTH] IN BLOOD BY AUTOMATED COUNT: 12.3 % (ref 11.6–15.1)
GLUCOSE UR STRIP-MCNC: NEGATIVE MG/DL
HBV SURFACE AG SER QL: NORMAL
HCT VFR BLD AUTO: 39.3 % (ref 34.8–46.1)
HGB BLD-MCNC: 12.8 G/DL (ref 11.5–15.4)
HGB UR QL STRIP.AUTO: NEGATIVE
HYALINE CASTS #/AREA URNS LPF: NORMAL /LPF
IMM GRANULOCYTES # BLD AUTO: 0.07 THOUSAND/UL (ref 0–0.2)
IMM GRANULOCYTES NFR BLD AUTO: 1 % (ref 0–2)
KETONES UR STRIP-MCNC: NEGATIVE MG/DL
LEUKOCYTE ESTERASE UR QL STRIP: ABNORMAL
LYMPHOCYTES # BLD AUTO: 3.13 THOUSANDS/ΜL (ref 0.6–4.47)
LYMPHOCYTES NFR BLD AUTO: 27 % (ref 14–44)
MCH RBC QN AUTO: 31.2 PG (ref 26.8–34.3)
MCHC RBC AUTO-ENTMCNC: 32.6 G/DL (ref 31.4–37.4)
MCV RBC AUTO: 96 FL (ref 82–98)
MONOCYTES # BLD AUTO: 0.85 THOUSAND/ΜL (ref 0.17–1.22)
MONOCYTES NFR BLD AUTO: 7 % (ref 4–12)
NEUTROPHILS # BLD AUTO: 7.27 THOUSANDS/ΜL (ref 1.85–7.62)
NEUTS SEG NFR BLD AUTO: 64 % (ref 43–75)
NITRITE UR QL STRIP: NEGATIVE
NON-SQ EPI CELLS URNS QL MICRO: NORMAL /HPF
NRBC BLD AUTO-RTO: 0 /100 WBCS
PH UR STRIP.AUTO: 6.5 [PH]
PLATELET # BLD AUTO: 335 THOUSANDS/UL (ref 149–390)
PMV BLD AUTO: 9.8 FL (ref 8.9–12.7)
PROT UR STRIP-MCNC: NEGATIVE MG/DL
RBC # BLD AUTO: 4.1 MILLION/UL (ref 3.81–5.12)
RBC #/AREA URNS AUTO: NORMAL /HPF
RH BLD: NEGATIVE
RUBV IGG SERPL IA-ACNC: 28 IU/ML
SP GR UR STRIP.AUTO: 1.01 (ref 1–1.03)
SPECIMEN EXPIRATION DATE: NORMAL
UROBILINOGEN UR QL STRIP.AUTO: 0.2 E.U./DL
WBC # BLD AUTO: 11.5 THOUSAND/UL (ref 4.31–10.16)
WBC #/AREA URNS AUTO: NORMAL /HPF

## 2021-03-18 PROCEDURE — 87086 URINE CULTURE/COLONY COUNT: CPT

## 2021-03-18 PROCEDURE — 81001 URINALYSIS AUTO W/SCOPE: CPT

## 2021-03-18 PROCEDURE — 80081 OBSTETRIC PANEL INC HIV TSTG: CPT

## 2021-03-18 PROCEDURE — 36415 COLL VENOUS BLD VENIPUNCTURE: CPT

## 2021-03-19 LAB
HIV 1+2 AB+HIV1 P24 AG SERPL QL IA: NORMAL
RPR SER QL: NORMAL

## 2021-03-20 LAB
BACTERIA UR CULT: ABNORMAL
BACTERIA UR CULT: ABNORMAL

## 2021-03-24 ENCOUNTER — TELEMEDICINE (OUTPATIENT)
Dept: OBGYN CLINIC | Facility: CLINIC | Age: 36
End: 2021-03-24

## 2021-03-24 VITALS — BODY MASS INDEX: 25.76 KG/M2 | HEIGHT: 62 IN | WEIGHT: 140 LBS

## 2021-03-24 DIAGNOSIS — Z34.81 PRENATAL CARE, SUBSEQUENT PREGNANCY IN FIRST TRIMESTER: ICD-10-CM

## 2021-03-24 DIAGNOSIS — Z71.89 PRENATAL CONSULT: Primary | ICD-10-CM

## 2021-03-24 PROCEDURE — OBC: Performed by: PHYSICIAN ASSISTANT

## 2021-03-24 NOTE — PROGRESS NOTES
Pt presents for prenatal consult  Her lmp is ~ 7  Her EDC is 10/14  She is 10 weeks     --had PPH  +GDM  O neg--requires rhogam  She is overall feeling well  Taking a PNV daily, and several supplements due to being vegan  flu shot and covid vaccine recd--pt is a nurse in network  Last pap was normal

## 2021-03-25 ENCOUNTER — INITIAL PRENATAL (OUTPATIENT)
Dept: OBGYN CLINIC | Facility: CLINIC | Age: 36
End: 2021-03-25
Payer: COMMERCIAL

## 2021-03-25 VITALS — WEIGHT: 146 LBS | BODY MASS INDEX: 26.7 KG/M2 | DIASTOLIC BLOOD PRESSURE: 70 MMHG | SYSTOLIC BLOOD PRESSURE: 120 MMHG

## 2021-03-25 DIAGNOSIS — Z36.89 ENCOUNTER FOR OTHER SPECIFIED ANTENATAL SCREENING: ICD-10-CM

## 2021-03-25 DIAGNOSIS — Z11.8 SCREENING FOR CHLAMYDIAL DISEASE: ICD-10-CM

## 2021-03-25 DIAGNOSIS — Z3A.11 11 WEEKS GESTATION OF PREGNANCY: ICD-10-CM

## 2021-03-25 DIAGNOSIS — Z11.3 SCREENING FOR STDS (SEXUALLY TRANSMITTED DISEASES): Primary | ICD-10-CM

## 2021-03-25 DIAGNOSIS — O23.41 UTI (URINARY TRACT INFECTION) DURING PREGNANCY, FIRST TRIMESTER: ICD-10-CM

## 2021-03-25 LAB
SL AMB  POCT GLUCOSE, UA: NEGATIVE
SL AMB LEUKOCYTE ESTERASE,UA: NEGATIVE
SL AMB POCT BILIRUBIN,UA: NEGATIVE
SL AMB POCT BLOOD,UA: NEGATIVE
SL AMB POCT CLARITY,UA: NORMAL
SL AMB POCT COLOR,UA: YELLOW
SL AMB POCT KETONES,UA: NEGATIVE
SL AMB POCT NITRITE,UA: NEGATIVE
SL AMB POCT PH,UA: 5
SL AMB POCT SPECIFIC GRAVITY,UA: 1000
SL AMB POCT URINE PROTEIN: NEGATIVE
SL AMB POCT UROBILINOGEN: NORMAL

## 2021-03-25 PROCEDURE — 76801 OB US < 14 WKS SINGLE FETUS: CPT | Performed by: OBSTETRICS & GYNECOLOGY

## 2021-03-25 PROCEDURE — 87491 CHLMYD TRACH DNA AMP PROBE: CPT | Performed by: OBSTETRICS & GYNECOLOGY

## 2021-03-25 PROCEDURE — PNV: Performed by: OBSTETRICS & GYNECOLOGY

## 2021-03-25 PROCEDURE — 87591 N.GONORRHOEAE DNA AMP PROB: CPT | Performed by: OBSTETRICS & GYNECOLOGY

## 2021-03-25 PROCEDURE — 81002 URINALYSIS NONAUTO W/O SCOPE: CPT | Performed by: OBSTETRICS & GYNECOLOGY

## 2021-03-25 NOTE — PROGRESS NOTES
Prior post partum hemorrhage treated with medications  CRL 10 weeks and 2 days  Repeat UA negative and prenatal panel reviewed  Education: Weight gain of 25-35 pounds, light exercise  Patient not interested in  testing at this time

## 2021-03-25 NOTE — PROGRESS NOTES
1st OB- cultures due  No bleeding or cramping  The patient has nausea on and off throughout the day  No vomiting, headaches or dizziness

## 2021-03-31 LAB
C TRACH DNA SPEC QL NAA+PROBE: NEGATIVE
N GONORRHOEA DNA SPEC QL NAA+PROBE: NEGATIVE

## 2021-04-22 ENCOUNTER — ROUTINE PRENATAL (OUTPATIENT)
Dept: OBGYN CLINIC | Facility: CLINIC | Age: 36
End: 2021-04-22
Payer: COMMERCIAL

## 2021-04-22 VITALS — WEIGHT: 156 LBS | DIASTOLIC BLOOD PRESSURE: 70 MMHG | SYSTOLIC BLOOD PRESSURE: 118 MMHG | BODY MASS INDEX: 28.53 KG/M2

## 2021-04-22 DIAGNOSIS — R82.90 ABNORMAL URINE: ICD-10-CM

## 2021-04-22 DIAGNOSIS — Z34.82 PRENATAL CARE, SUBSEQUENT PREGNANCY IN SECOND TRIMESTER: Primary | ICD-10-CM

## 2021-04-22 DIAGNOSIS — Z36.9 ANTENATAL SCREENING ENCOUNTER: ICD-10-CM

## 2021-04-22 LAB
SL AMB  POCT GLUCOSE, UA: NEGATIVE
SL AMB LEUKOCYTE ESTERASE,UA: NEGATIVE
SL AMB POCT BILIRUBIN,UA: NEGATIVE
SL AMB POCT BLOOD,UA: NEGATIVE
SL AMB POCT KETONES,UA: NEGATIVE
SL AMB POCT NITRITE,UA: NEGATIVE
SL AMB POCT PH,UA: 6
SL AMB POCT SPECIFIC GRAVITY,UA: NEGATIVE
SL AMB POCT URINE PROTEIN: NEGATIVE
SL AMB POCT UROBILINOGEN: NEGATIVE

## 2021-04-22 PROCEDURE — 81002 URINALYSIS NONAUTO W/O SCOPE: CPT | Performed by: PHYSICIAN ASSISTANT

## 2021-04-22 PROCEDURE — PNV: Performed by: PHYSICIAN ASSISTANT

## 2021-04-22 NOTE — PROGRESS NOTES
Patient is feeling well  She did have some fatigue, but states it is getting better  Patient has no bleeding, leaking or cramping

## 2021-04-22 NOTE — PROGRESS NOTES
Pt is feeling well  No c/o  Normal ob panel--trace leuks and +lactobacillus---repeat UA today is CLEAR  Pt is o neg  Mr is ab pos  Will need rhogam at 28 weeks visit  Not interested in early testing  rx given today for 20 week us  Daughter is 20 months at home

## 2021-05-06 ENCOUNTER — HOSPITAL ENCOUNTER (EMERGENCY)
Facility: HOSPITAL | Age: 36
Discharge: HOME | End: 2021-05-06
Attending: EMERGENCY MEDICINE | Admitting: EMERGENCY MEDICINE
Payer: COMMERCIAL

## 2021-05-06 VITALS
WEIGHT: 156 LBS | TEMPERATURE: 97.8 F | RESPIRATION RATE: 16 BRPM | BODY MASS INDEX: 28.71 KG/M2 | HEART RATE: 89 BPM | OXYGEN SATURATION: 100 % | DIASTOLIC BLOOD PRESSURE: 70 MMHG | HEIGHT: 62 IN | SYSTOLIC BLOOD PRESSURE: 118 MMHG

## 2021-05-06 DIAGNOSIS — R55 VASOVAGAL NEAR SYNCOPE: Primary | ICD-10-CM

## 2021-05-06 LAB
ANION GAP SERPL CALCULATED.3IONS-SCNC: 6 MMOL/L (ref 4–13)
ATRIAL RATE: 86 BPM
BASOPHILS # BLD AUTO: 0.04 THOUSANDS/ΜL (ref 0–0.1)
BASOPHILS NFR BLD AUTO: 0 % (ref 0–1)
BUN SERPL-MCNC: 11 MG/DL (ref 5–25)
CALCIUM SERPL-MCNC: 8.9 MG/DL (ref 8.3–10.1)
CHLORIDE SERPL-SCNC: 106 MMOL/L (ref 100–108)
CO2 SERPL-SCNC: 26 MMOL/L (ref 21–32)
CREAT SERPL-MCNC: 0.46 MG/DL (ref 0.6–1.3)
EOSINOPHIL # BLD AUTO: 0.12 THOUSAND/ΜL (ref 0–0.61)
EOSINOPHIL NFR BLD AUTO: 1 % (ref 0–6)
ERYTHROCYTE [DISTWIDTH] IN BLOOD BY AUTOMATED COUNT: 12.7 % (ref 11.6–15.1)
GFR SERPL CREATININE-BSD FRML MDRD: 128 ML/MIN/1.73SQ M
GLUCOSE SERPL-MCNC: 103 MG/DL (ref 65–140)
HCT VFR BLD AUTO: 37.7 % (ref 34.8–46.1)
HGB BLD-MCNC: 12.4 G/DL (ref 11.5–15.4)
IMM GRANULOCYTES # BLD AUTO: 0.09 THOUSAND/UL (ref 0–0.2)
IMM GRANULOCYTES NFR BLD AUTO: 1 % (ref 0–2)
LYMPHOCYTES # BLD AUTO: 2.47 THOUSANDS/ΜL (ref 0.6–4.47)
LYMPHOCYTES NFR BLD AUTO: 26 % (ref 14–44)
MCH RBC QN AUTO: 31.4 PG (ref 26.8–34.3)
MCHC RBC AUTO-ENTMCNC: 32.9 G/DL (ref 31.4–37.4)
MCV RBC AUTO: 95 FL (ref 82–98)
MONOCYTES # BLD AUTO: 0.56 THOUSAND/ΜL (ref 0.17–1.22)
MONOCYTES NFR BLD AUTO: 6 % (ref 4–12)
NEUTROPHILS # BLD AUTO: 6.09 THOUSANDS/ΜL (ref 1.85–7.62)
NEUTS SEG NFR BLD AUTO: 66 % (ref 43–75)
NRBC BLD AUTO-RTO: 0 /100 WBCS
P AXIS: 73 DEGREES
PLATELET # BLD AUTO: 233 THOUSANDS/UL (ref 149–390)
PMV BLD AUTO: 9.5 FL (ref 8.9–12.7)
POTASSIUM SERPL-SCNC: 3.7 MMOL/L (ref 3.5–5.3)
PR INTERVAL: 192 MS
QRS AXIS: 87 DEGREES
QRSD INTERVAL: 80 MS
QT INTERVAL: 338 MS
QTC INTERVAL: 404 MS
RBC # BLD AUTO: 3.95 MILLION/UL (ref 3.81–5.12)
SODIUM SERPL-SCNC: 138 MMOL/L (ref 136–145)
T WAVE AXIS: 62 DEGREES
VENTRICULAR RATE: 86 BPM
WBC # BLD AUTO: 9.37 THOUSAND/UL (ref 4.31–10.16)

## 2021-05-06 PROCEDURE — 99283 EMERGENCY DEPT VISIT LOW MDM: CPT

## 2021-05-06 PROCEDURE — 80048 BASIC METABOLIC PNL TOTAL CA: CPT | Performed by: EMERGENCY MEDICINE

## 2021-05-06 PROCEDURE — 99285 EMERGENCY DEPT VISIT HI MDM: CPT | Performed by: EMERGENCY MEDICINE

## 2021-05-06 PROCEDURE — 93005 ELECTROCARDIOGRAM TRACING: CPT

## 2021-05-06 PROCEDURE — 36415 COLL VENOUS BLD VENIPUNCTURE: CPT | Performed by: EMERGENCY MEDICINE

## 2021-05-06 PROCEDURE — 93010 ELECTROCARDIOGRAM REPORT: CPT | Performed by: INTERNAL MEDICINE

## 2021-05-06 PROCEDURE — 85025 COMPLETE CBC W/AUTO DIFF WBC: CPT | Performed by: EMERGENCY MEDICINE

## 2021-05-06 NOTE — ED PROCEDURE NOTE
PROCEDURE  ECG 12 Lead Documentation Only    Date/Time: 5/6/2021 10:06 AM  Performed by: Keon Callahan MD  Authorized by: Keon Callahan MD     ECG reviewed by me, the ED Provider: yes    Patient location:  ED  Previous ECG:     Previous ECG:  Compared to current    Similarity:  No change  Interpretation:     Interpretation: normal    Rate:     ECG rate assessment: normal    Rhythm:     Rhythm: sinus rhythm    Ectopy:     Ectopy: none    QRS:     QRS axis:  Normal  Conduction:     Conduction: normal    ST segments:     ST segments:  Normal  T waves:     T waves: normal           Keon Callahan MD  05/06/21 1006

## 2021-05-06 NOTE — Clinical Note
Hailey Poag was seen and treated in our emergency department on 5/6/2021  Diagnosis:     Cyndee Ennis  may return to work on return date  She may return on this date: 05/07/2021         If you have any questions or concerns, please don't hesitate to call        Brandyn Carrasco MD    ______________________________           _______________          _______________  Hospital Representative                              Date                                Time

## 2021-05-06 NOTE — ED PROVIDER NOTES
Emergency Department Note- Gary Stahl 38 y o  female MRN: [id number]    Unit/Bed#: ED 12 Encounter: [id number]        History of Present Illness   HPI:  Gary Stahl is a 38 y o  female who presents with near syncope lightheaded, the patient was at an in-service she was standing for period of time he started to feel very lightheaded she felt as though her vision was closing in on her she became somewhat nauseous she did not vomit she became very pale and diaphoretic she was helped to a seated position and supine by her coworkers  And at the present time she is feeling much improved  Specifically denies headache neck pain denies chest pain or shortness of breath denies abdominal pain  Patient is 15 weeks pregnant she has had no problems with the pregnancy she has had no vaginal bleeding and she has no abdominal pain  No focal neurologic symptoms  No prior episode of similar she has been eating normally  Review of Systems  REVIEW OF SYSTEMS  Constitutional:  denies fever, chills, no weight loss   Eyes:  Denies visual changes, denies eye pain    HENT:  Denies nasal congestion or sore throat   Respiratory:  Denies cough or shortness of breath, denies hemoptysis    Cardiovascular:  Denies chest pain, palpitations, or leg edema    GI:  Denies abdominal pain, nausea, vomiting, bloody stools, melena, or diarrhea   :  Denies dysuria, hematuria, polyuria   Musculoskeletal:  Denies back pain or joint pain   Integument:  Denies rash, denies color change    Neurologic:  Denies headache, focal weakness or sensory changes   Endocrine:  Denies polyuria or polydipsia   Lymphatic:  Denies swollen glands   Psychiatric:  Denies depression or anxiety     All system reviewed and negative except as noted above or in HPI    Historical Information   Past Medical History:   Diagnosis Date    Abnormal Pap smear of cervix     Back pain     GERD (gastroesophageal reflux disease)     Gestational diabetes     Helicobacter pylori (H  pylori) infection     Varicella     as child     Past Surgical History:   Procedure Laterality Date    CERVICAL BIOPSY  W/ LOOP ELECTRODE EXCISION      2012    [place] ESOPHAGOGASTRODUODENOSCOPY TRANSORAL DIAGNOSTIC N/A 2016    Procedure: ESOPHAGOGASTRODUODENOSCOPY (EGD); Surgeon: Ivelisse Johnston MD;  Location: [facility]; Service: Gastroenterology    WISDOM TOOTH EXTRACTION Bilateral      Social History   Social History     Substance and Sexual Activity   Alcohol Use Not Currently    Frequency: 2-3 times a week    Drinks per session: 1 or 2    Binge frequency: Never    Comment: social     Social History     Substance and Sexual Activity   Drug Use No     Social History     Tobacco Use   Smoking Status Former Smoker    Packs/day: 0 25    Years: 8 00    Pack years: 2 00    Types: Cigarettes    Quit date: 2017    Years since quittin 3   Smokeless Tobacco Never Used     Family History:   Family History   Problem Relation Age of Onset    Depression Father     Hyperlipidemia Father     Depression Brother     Mental illness Family         anxiety    Cancer Maternal Grandfather         bile duct cancer/stomach ca    Hyperlipidemia Mother     No Known Problems Maternal Grandmother     Heart disease Paternal Grandfather         MI    No Known Problems Daughter        Meds/Allergies   (Not in a hospital admission)    Allergies   Allergen Reactions    Meloxicam Nausea Only and Headache    Naproxen Other (See Comments)     unspecified       Objective   Vitals: Blood pressure 118/70, pulse 89, temperature 97 8 °F (36 6 °C), temperature source Oral, resp  rate 16, height 5' 2" (1 575 m), weight 70 8 kg (156 lb), last menstrual period 2021, SpO2 100 %, not currently breastfeeding      PHYSICAL EXAM  Constitutional:  Well developed, well nourished, no acute distress, non toxic appearance   Eyes:   PERRL, EOMI, conjunctiva normal, sclera anicteric, no proptosis   HENT:  Atraumatic, external ears normal, nose normal, oropharynx moist, no pharyngeal exudates  Neck  no JVD, no bruits,  normal range of motion, no tenderness, supple, thyroid normal    Respiratory:  No respiratory distress, normal breath sounds B/L, no rales, no wheezing     Cardiovascular:  NSR, no M/G/R  GI:  Soft,  Normal BS,  ND,  NT,  No mass or bruits   :  No costovertebral angle tenderness   Extremities: No edema, no tenderness, no deformities  Normal pulses   Musculoskeletal:   Back - no midline tenderness,  FROM  Upper and lower extremities   SKIN:   no rash, warm and dry    Neurologic:  Alert & oriented x 3, CN 2-12 intact, normal motor function, normal sensory function, no focal deficits noted, finger-nose normal heel-shin normal  Psychiatric:  Speech and behavior appropriate normal judgement and insight      Lab Results: Lab Results: I have personally reviewed pertinent lab results  Labs Reviewed   BASIC METABOLIC PANEL - Abnormal       Result Value Ref Range Status    Sodium 138  136 - 145 mmol/L Final    Potassium 3 7  3 5 - 5 3 mmol/L Final    Chloride 106  100 - 108 mmol/L Final    CO2 26  21 - 32 mmol/L Final    ANION GAP 6  4 - 13 mmol/L Final    BUN 11  5 - 25 mg/dL Final    Creatinine 0 46 (*) 0 60 - 1 30 mg/dL Final    Comment: Standardized to IDMS reference method    Glucose 103  65 - 140 mg/dL Final    Comment: If the patient is fasting, the ADA then defines impaired fasting glucose as > 100 mg/dL and diabetes as > or equal to 123 mg/dL  Specimen collection should occur prior to Sulfasalazine administration due to the potential for falsely depressed results  Specimen collection should occur prior to Sulfapyridine administration due to the potential for falsely elevated results      Calcium 8 9  8 3 - 10 1 mg/dL Final    eGFR 128  ml/min/1 73sq m Final    Narrative:     [place] guidelines for Chronic Kidney Disease (CKD):     Stage 1 with normal or high GFR (GFR > 90 mL/min/1 73 square meters)    Stage 2 Mild CKD (GFR = 60-89 mL/min/1 73 square meters)    Stage 3A Moderate CKD (GFR = 45-59 mL/min/1 73 square meters)    Stage 3B Moderate CKD (GFR = 30-44 mL/min/1 73 square meters)    Stage 4 Severe CKD (GFR = 15-29 mL/min/1 73 square meters)    Stage 5 End Stage CKD (GFR <15 mL/min/1 73 square meters)  Note: GFR calculation is accurate only with a steady state creatinine   CBC AND DIFFERENTIAL    WBC 9 37  4 31 - 10 16 Thousand/uL Final    RBC 3 95  3 81 - 5 12 Million/uL Final    Hemoglobin 12 4  11 5 - 15 4 g/dL Final    Hematocrit 37 7  34 8 - 46 1 % Final    MCV 95  82 - 98 fL Final    MCH 31 4  26 8 - 34 3 pg Final    MCHC 32 9  31 4 - 37 4 g/dL Final    RDW 12 7  11 6 - 15 1 % Final    MPV 9 5  8 9 - 12 7 fL Final    Platelets 670  392 - 390 Thousands/uL Final    nRBC 0  /100 WBCs Final    Neutrophils Relative 66  43 - 75 % Final    Immat GRANS % 1  0 - 2 % Final    Lymphocytes Relative 26  14 - 44 % Final    Monocytes Relative 6  4 - 12 % Final    Eosinophils Relative 1  0 - 6 % Final    Basophils Relative 0  0 - 1 % Final    Neutrophils Absolute 6 09  1 85 - 7 62 Thousands/µL Final    Immature Grans Absolute 0 09  0 00 - 0 20 Thousand/uL Final    Lymphocytes Absolute 2 47  0 60 - 4 47 Thousands/µL Final    Monocytes Absolute 0 56  0 17 - 1 22 Thousand/µL Final    Eosinophils Absolute 0 12  0 00 - 0 61 Thousand/µL Final    Basophils Absolute 0 04  0 00 - 0 10 Thousands/µL Final     Imaging: I have personally reviewed pertinent reports  No orders to display     EKG, Pathology, and Other Studies: I have personally reviewed pertinent films in PACS    bedside ultrasound performed   baby is moving vigorously heart rate is normal     EKG is normal Lab workup unremarkable  Patient re-evaluated she is feeling well vital signs are stable  Careful return precautions given  Assessment/Plan     ED Medical Decision Making:  Vasovagal syncope  Lab workup EKG re-evaluation  1   Vasovagal near syncope           Wendi Arzate MD  05/06/21 [id number]

## 2021-05-11 ENCOUNTER — TELEPHONE (OUTPATIENT)
Dept: OBGYN CLINIC | Facility: CLINIC | Age: 36
End: 2021-05-11

## 2021-05-20 ENCOUNTER — ROUTINE PRENATAL (OUTPATIENT)
Dept: OBGYN CLINIC | Facility: CLINIC | Age: 36
End: 2021-05-20

## 2021-05-20 VITALS — WEIGHT: 160 LBS | DIASTOLIC BLOOD PRESSURE: 72 MMHG | BODY MASS INDEX: 29.26 KG/M2 | SYSTOLIC BLOOD PRESSURE: 118 MMHG

## 2021-05-20 DIAGNOSIS — Z3A.19 19 WEEKS GESTATION OF PREGNANCY: Primary | ICD-10-CM

## 2021-05-20 PROCEDURE — PNV: Performed by: OBSTETRICS & GYNECOLOGY

## 2021-05-20 NOTE — PROGRESS NOTES
positive fetal movement  No leaking or bleeding    Going for level 2 ultrasound next week  O negative blood type

## 2021-06-03 ENCOUNTER — ROUTINE PRENATAL (OUTPATIENT)
Dept: PERINATAL CARE | Facility: CLINIC | Age: 36
End: 2021-06-03
Payer: COMMERCIAL

## 2021-06-03 VITALS
WEIGHT: 164.6 LBS | DIASTOLIC BLOOD PRESSURE: 57 MMHG | SYSTOLIC BLOOD PRESSURE: 124 MMHG | HEIGHT: 62 IN | HEART RATE: 96 BPM | BODY MASS INDEX: 30.29 KG/M2

## 2021-06-03 DIAGNOSIS — Z3A.21 21 WEEKS GESTATION OF PREGNANCY: Primary | ICD-10-CM

## 2021-06-03 DIAGNOSIS — O09.522 ELDERLY MULTIGRAVIDA, SECOND TRIMESTER: ICD-10-CM

## 2021-06-03 DIAGNOSIS — Z34.82 PRENATAL CARE, SUBSEQUENT PREGNANCY IN SECOND TRIMESTER: ICD-10-CM

## 2021-06-03 DIAGNOSIS — Z36.9 ANTENATAL SCREENING ENCOUNTER: ICD-10-CM

## 2021-06-03 DIAGNOSIS — Z36.86 ENCOUNTER FOR ANTENATAL SCREENING FOR CERVICAL LENGTH: ICD-10-CM

## 2021-06-03 DIAGNOSIS — O09.292 H/O SHOULDER DYSTOCIA IN PRIOR PREGNANCY, CURRENTLY PREGNANT, SECOND TRIMESTER: ICD-10-CM

## 2021-06-03 PROCEDURE — 76811 OB US DETAILED SNGL FETUS: CPT | Performed by: OBSTETRICS & GYNECOLOGY

## 2021-06-03 PROCEDURE — 76817 TRANSVAGINAL US OBSTETRIC: CPT | Performed by: OBSTETRICS & GYNECOLOGY

## 2021-06-03 PROCEDURE — 99242 OFF/OP CONSLTJ NEW/EST SF 20: CPT | Performed by: OBSTETRICS & GYNECOLOGY

## 2021-06-03 NOTE — PROGRESS NOTES
Please refer to the [facility] ultrasound report in Ob Procedures for additional information regarding today's visit

## 2021-06-03 NOTE — PROGRESS NOTES
Ultrasound Probe Disinfection    A transvaginal ultrasound was performed  Prior to use, disinfection was performed with High Level Disinfection Process (Trophon)  Probe serial number [id number]: [id number] was used        Alyne Bernheim  06/03/21  2:27 PM

## 2021-06-03 NOTE — LETTER
Kristi 3, 2021     Zakiya Sherman MD  [address] [place] [id number]    Patient: Sahil Vora   YOB: 1985   Date of Visit: 6/3/2021       Dear Dr Flavio Becerra: Thank you for referring Sahil Vora to me for evaluation  Below are my notes for this consultation  If you have questions, please do not hesitate to call me  I look forward to following your patient along with you  Sincerely,        Zackery Wheeler MD        CC: No Recipients  Zackery Wheeler MD  6/3/2021  4:26 PM  Sign when Signing Visit    Please refer to the [facility] ultrasound report in Ob Procedures for additional information regarding today's visit

## 2021-06-17 ENCOUNTER — ROUTINE PRENATAL (OUTPATIENT)
Dept: OBGYN CLINIC | Facility: CLINIC | Age: 36
End: 2021-06-17

## 2021-06-17 VITALS — DIASTOLIC BLOOD PRESSURE: 82 MMHG | BODY MASS INDEX: 30.36 KG/M2 | SYSTOLIC BLOOD PRESSURE: 130 MMHG | WEIGHT: 166 LBS

## 2021-06-17 DIAGNOSIS — Z3A.23 23 WEEKS GESTATION OF PREGNANCY: Primary | ICD-10-CM

## 2021-06-17 PROCEDURE — PNV: Performed by: OBSTETRICS & GYNECOLOGY

## 2021-06-17 NOTE — PROGRESS NOTES
[id number]  No bleeding or leaking  Positive fetal movement  Orders placed for 1 hour glucola and CBC with platelets  Education provided on proper hydration

## 2021-07-10 ENCOUNTER — APPOINTMENT (OUTPATIENT)
Dept: LAB | Facility: CLINIC | Age: 36
End: 2021-07-10
Payer: COMMERCIAL

## 2021-07-10 DIAGNOSIS — Z3A.23 23 WEEKS GESTATION OF PREGNANCY: ICD-10-CM

## 2021-07-10 LAB
ERYTHROCYTE [DISTWIDTH] IN BLOOD BY AUTOMATED COUNT: 13 % (ref 11.6–15.1)
GLUCOSE 1H P 50 G GLC PO SERPL-MCNC: 175 MG/DL (ref 40–134)
HCT VFR BLD AUTO: 36.9 % (ref 34.8–46.1)
HGB BLD-MCNC: 11.9 G/DL (ref 11.5–15.4)
MCH RBC QN AUTO: 31.1 PG (ref 26.8–34.3)
MCHC RBC AUTO-ENTMCNC: 32.2 G/DL (ref 31.4–37.4)
MCV RBC AUTO: 96 FL (ref 82–98)
PLATELET # BLD AUTO: 253 THOUSANDS/UL (ref 149–390)
PMV BLD AUTO: 9.6 FL (ref 8.9–12.7)
RBC # BLD AUTO: 3.83 MILLION/UL (ref 3.81–5.12)
WBC # BLD AUTO: 10.73 THOUSAND/UL (ref 4.31–10.16)

## 2021-07-10 PROCEDURE — 36415 COLL VENOUS BLD VENIPUNCTURE: CPT

## 2021-07-10 PROCEDURE — 82950 GLUCOSE TEST: CPT

## 2021-07-10 PROCEDURE — 85027 COMPLETE CBC AUTOMATED: CPT

## 2021-07-15 ENCOUNTER — ROUTINE PRENATAL (OUTPATIENT)
Dept: OBGYN CLINIC | Facility: CLINIC | Age: 36
End: 2021-07-15
Payer: COMMERCIAL

## 2021-07-15 VITALS — SYSTOLIC BLOOD PRESSURE: 118 MMHG | BODY MASS INDEX: 30.54 KG/M2 | WEIGHT: 167 LBS | DIASTOLIC BLOOD PRESSURE: 78 MMHG

## 2021-07-15 DIAGNOSIS — Z3A.27 27 WEEKS GESTATION OF PREGNANCY: Primary | ICD-10-CM

## 2021-07-15 PROCEDURE — 96372 THER/PROPH/DIAG INJ SC/IM: CPT

## 2021-07-15 PROCEDURE — PNV: Performed by: OBSTETRICS & GYNECOLOGY

## 2021-07-15 NOTE — PROGRESS NOTES
Patient is feeling well  No bleeding, leaking or cramping  No swelling or headaches  Patient is due for rhogam injection today  Patient tolerated injection well on LEFT delt      LOT #:[id number]  Mary Kate Galvan  ULN:[id number]

## 2021-07-15 NOTE — PROGRESS NOTES
G2  Prior history of gestational diabetes  Failed 1 hour GTT, going for 3 hour GTT this Saturday  Fasting blood sugars low 90s  Following diabetic diet  Recommend exercise  Hg 11 9  Due for Rhogam   Gave sheet on fetal kick counts

## 2021-07-18 ENCOUNTER — APPOINTMENT (OUTPATIENT)
Dept: LAB | Facility: CLINIC | Age: 36
End: 2021-07-18
Payer: COMMERCIAL

## 2021-07-18 DIAGNOSIS — Z36.89 ENCOUNTER FOR OTHER SPECIFIED ANTENATAL SCREENING: ICD-10-CM

## 2021-07-18 DIAGNOSIS — Z13.1 ENCOUNTER FOR SCREENING FOR DIABETES MELLITUS: ICD-10-CM

## 2021-07-18 LAB
GLUCOSE 1H P 100 G GLC PO SERPL-MCNC: 158 MG/DL (ref 65–179)
GLUCOSE 2H P 100 G GLC PO SERPL-MCNC: 156 MG/DL (ref 65–154)
GLUCOSE 3H P 100 G GLC PO SERPL-MCNC: 82 MG/DL (ref 65–139)
GLUCOSE P FAST SERPL-MCNC: 90 MG/DL (ref 65–99)

## 2021-07-18 PROCEDURE — 82951 GLUCOSE TOLERANCE TEST (GTT): CPT

## 2021-07-18 PROCEDURE — 82952 GTT-ADDED SAMPLES: CPT

## 2021-07-18 PROCEDURE — 36415 COLL VENOUS BLD VENIPUNCTURE: CPT

## 2021-07-19 ENCOUNTER — TRANSCRIBE ORDERS (OUTPATIENT)
Dept: OBGYN CLINIC | Facility: CLINIC | Age: 36
End: 2021-07-19

## 2021-07-19 ENCOUNTER — TELEPHONE (OUTPATIENT)
Dept: OBGYN CLINIC | Facility: CLINIC | Age: 36
End: 2021-07-19

## 2021-07-19 DIAGNOSIS — M79.605 LEFT LEG PAIN: Primary | ICD-10-CM

## 2021-07-19 DIAGNOSIS — O26.893 PREGNANCY RELATED LEG PAIN, ANTEPARTUM, THIRD TRIMESTER: ICD-10-CM

## 2021-07-19 DIAGNOSIS — M79.606 PREGNANCY RELATED LEG PAIN, ANTEPARTUM, THIRD TRIMESTER: ICD-10-CM

## 2021-07-19 NOTE — TELEPHONE ENCOUNTER
Patient called with pain in her leg, Patient not sure if it is varicose vein or clot  Patient also states it is swollen and painful

## 2021-07-22 ENCOUNTER — HOSPITAL ENCOUNTER (OUTPATIENT)
Dept: NON INVASIVE DIAGNOSTICS | Facility: HOSPITAL | Age: 36
Discharge: HOME | End: 2021-07-22
Payer: COMMERCIAL

## 2021-07-22 DIAGNOSIS — M79.605 PAIN IN LEFT LEG: ICD-10-CM

## 2021-07-22 PROCEDURE — 93970 EXTREMITY STUDY: CPT | Performed by: SURGERY

## 2021-07-22 PROCEDURE — 93970 EXTREMITY STUDY: CPT

## 2021-07-27 PROBLEM — O09.293 HISTORY OF SHOULDER DYSTOCIA IN PRIOR PREGNANCY, CURRENTLY PREGNANT IN THIRD TRIMESTER: Status: ACTIVE | Noted: 2021-06-03

## 2021-07-27 PROBLEM — O09.523 AMA (ADVANCED MATERNAL AGE) MULTIGRAVIDA 35+, THIRD TRIMESTER: Status: ACTIVE | Noted: 2021-06-03

## 2021-07-27 PROBLEM — O09.299 HISTORY OF POSTPARTUM HEMORRHAGE, CURRENTLY PREGNANT: Status: ACTIVE | Noted: 2021-07-27

## 2021-07-27 NOTE — PATIENT INSTRUCTIONS
Thank you for choosing us for your  care today  If you have any questions about your ultrasound or care, please do not hesitate to contact us or your primary obstetrician  Some general instructions for your pregnancy are:     Protect against coronavirus: Continue to practice social distancing, wear a mask, and wash your hands often  Pregnant women are increased risk of severe COVID  Notify your primary care doctor if you have any symptoms including cough, shortness of breath or difficulty breathing, fever, chills, muscle pain, sore throat, or loss of taste or smell  Pregnant women can receive the coronavirus vaccine   Exercise: Aim for 22 minutes per day (150 minutes per week) of regular exercise  Walking is great!  Nutrition: aim for calcium-rich and iron-rich foods as well as healthy sources of protein   Protect against the flu: get yourself and your entire household vaccinated against influenza  This will protect your baby   Learn about Preeclampsia: preeclampsia is a common, serious high blood pressure complication in pregnancy  A blood pressure of 657YVJC (systolic or top number) or 71VBLJ (diastolic or bottom number) is not normal and needs evaluation by your doctor   If you smoke, try to reduce how many cigarettes you smoke or try to quit completely  Do not vape   Other warning signs to watch out for in pregnancy or postpartum: chest pain, obstructed breathing or shortness of breath, seizures, thoughts of hurting yourself or your baby, bleeding, a painful or swollen leg, fever, or headache (see [facility] POST-BIRTH Warning Signs campaign)  If these happen call 911  Itching is also not normal in pregnancy and if you experience this, especially over your hands and feet, potentially worse at night, notify your doctors     Lastly, if you are contacted regarding participation in a survey about your experience in our office, please know that we take any feedback you provide seriously and use it to improve how we deliver care through our center

## 2021-07-28 ENCOUNTER — ULTRASOUND (OUTPATIENT)
Dept: PERINATAL CARE | Facility: CLINIC | Age: 36
End: 2021-07-28
Payer: COMMERCIAL

## 2021-07-28 VITALS
HEIGHT: 62 IN | WEIGHT: 170 LBS | SYSTOLIC BLOOD PRESSURE: 116 MMHG | BODY MASS INDEX: 31.28 KG/M2 | HEART RATE: 98 BPM | DIASTOLIC BLOOD PRESSURE: 56 MMHG

## 2021-07-28 DIAGNOSIS — O09.523 AMA (ADVANCED MATERNAL AGE) MULTIGRAVIDA 35+, THIRD TRIMESTER: ICD-10-CM

## 2021-07-28 DIAGNOSIS — O09.293 HISTORY OF SHOULDER DYSTOCIA IN PRIOR PREGNANCY, CURRENTLY PREGNANT IN THIRD TRIMESTER: Primary | ICD-10-CM

## 2021-07-28 DIAGNOSIS — Z36.89 ENCOUNTER FOR ULTRASOUND TO CHECK FETAL GROWTH: ICD-10-CM

## 2021-07-28 DIAGNOSIS — Z36.2 ENCOUNTER FOR FOLLOW-UP ULTRASOUND OF FETAL ANATOMY: ICD-10-CM

## 2021-07-28 DIAGNOSIS — O09.299 HISTORY OF POSTPARTUM HEMORRHAGE, CURRENTLY PREGNANT: ICD-10-CM

## 2021-07-28 PROCEDURE — 76816 OB US FOLLOW-UP PER FETUS: CPT | Performed by: OBSTETRICS & GYNECOLOGY

## 2021-07-28 PROCEDURE — 99213 OFFICE O/P EST LOW 20 MIN: CPT | Performed by: OBSTETRICS & GYNECOLOGY

## 2021-07-28 NOTE — PROGRESS NOTES
[address]: Ms Pankaj Greco was seen today at 28w6d for fetal growth and followup missed anatomy ultrasound  See ultrasound report under "OB Procedures" tab  Please don't hesitate to contact our office with any concerns or questions    Huber Rodríguez MD

## 2021-08-11 ENCOUNTER — ROUTINE PRENATAL (OUTPATIENT)
Dept: OBGYN CLINIC | Facility: CLINIC | Age: 36
End: 2021-08-11
Payer: COMMERCIAL

## 2021-08-11 VITALS — WEIGHT: 171 LBS | BODY MASS INDEX: 31.28 KG/M2 | DIASTOLIC BLOOD PRESSURE: 82 MMHG | SYSTOLIC BLOOD PRESSURE: 130 MMHG

## 2021-08-11 DIAGNOSIS — Z23 VACCINE FOR DIPHTHERIA-TETANUS-PERTUSSIS, COMBINED: Primary | ICD-10-CM

## 2021-08-11 DIAGNOSIS — Z3A.31 31 WEEKS GESTATION OF PREGNANCY: ICD-10-CM

## 2021-08-11 PROCEDURE — 90715 TDAP VACCINE 7 YRS/> IM: CPT

## 2021-08-11 PROCEDURE — 90471 IMMUNIZATION ADMIN: CPT

## 2021-08-11 PROCEDURE — PNV: Performed by: OBSTETRICS & GYNECOLOGY

## 2021-08-11 NOTE — PROGRESS NOTES
tdap due today  The patient was given the vaccine in the LEFT DELT  The patient tolerated the injection well  LOT: [id number]  EXP: 6/3/2023  NDC: 86530-243-80    No bleeding or cramping  No nausea, vomiting, headache or dizziness

## 2021-08-11 NOTE — PROGRESS NOTES
no contractions no bleeding no leaking  Positive fetal movement  Received Tdap vaccine today  Information on perineal massage given  Patient had a shoulder dystocia last pregnancy  Ultrasound at  on  showed the baby at 17% for growth  It was also done for missed anatomy and was normal   Patient scheduled for an ultrasound had 38 weeks for estimated fetal weight  Her 2 hour value was abnormal, 156; range up to 154 on her 3 hour GTT  Encouraged to exercise and eat healthy diet

## 2021-08-26 ENCOUNTER — TELEPHONE (OUTPATIENT)
Dept: OBGYN CLINIC | Facility: CLINIC | Age: 36
End: 2021-08-26

## 2021-08-26 ENCOUNTER — ROUTINE PRENATAL (OUTPATIENT)
Dept: OBGYN CLINIC | Facility: CLINIC | Age: 36
End: 2021-08-26

## 2021-08-26 VITALS — DIASTOLIC BLOOD PRESSURE: 82 MMHG | BODY MASS INDEX: 31.64 KG/M2 | WEIGHT: 173 LBS | SYSTOLIC BLOOD PRESSURE: 120 MMHG

## 2021-08-26 DIAGNOSIS — Z34.83 PRENATAL CARE, SUBSEQUENT PREGNANCY IN THIRD TRIMESTER: Primary | ICD-10-CM

## 2021-08-26 PROCEDURE — PNV: Performed by: PHYSICIAN ASSISTANT

## 2021-08-26 NOTE — PROGRESS NOTES
Pt is feeling well  +FM  No c/o today, feeling well  rx breast pump  Pt recd rhogam and tdap  fbs and 2 hour pp were normal  Has growth scan at 36 weeks  H/o shoulder dystocia with 1st baby

## 2021-09-09 ENCOUNTER — TELEPHONE (OUTPATIENT)
Dept: OTHER | Facility: OTHER | Age: 36
End: 2021-09-09

## 2021-09-09 DIAGNOSIS — Z11.52 ENCOUNTER FOR SCREENING FOR COVID-19: Primary | ICD-10-CM

## 2021-09-12 PROCEDURE — U0003 INFECTIOUS AGENT DETECTION BY NUCLEIC ACID (DNA OR RNA); SEVERE ACUTE RESPIRATORY SYNDROME CORONAVIRUS 2 (SARS-COV-2) (CORONAVIRUS DISEASE [COVID-19]), AMPLIFIED PROBE TECHNIQUE, MAKING USE OF HIGH THROUGHPUT TECHNOLOGIES AS DESCRIBED BY CMS-2020-01-R: HCPCS | Performed by: INTERNAL MEDICINE

## 2021-09-12 PROCEDURE — U0005 INFEC AGEN DETEC AMPLI PROBE: HCPCS | Performed by: INTERNAL MEDICINE

## 2021-09-13 ENCOUNTER — APPOINTMENT (OUTPATIENT)
Dept: LAB | Facility: HOSPITAL | Age: 36
End: 2021-09-13

## 2021-09-13 DIAGNOSIS — Z00.8 ENCOUNTER FOR OTHER GENERAL EXAMINATION: ICD-10-CM

## 2021-09-13 LAB
EST. AVERAGE GLUCOSE BLD GHB EST-MCNC: 100 MG/DL
HBA1C MFR BLD: 5.1 %

## 2021-09-13 PROCEDURE — 83036 HEMOGLOBIN GLYCOSYLATED A1C: CPT

## 2021-09-13 PROCEDURE — 36415 COLL VENOUS BLD VENIPUNCTURE: CPT

## 2021-09-14 ENCOUNTER — ROUTINE PRENATAL (OUTPATIENT)
Dept: OBGYN CLINIC | Facility: CLINIC | Age: 36
End: 2021-09-14

## 2021-09-14 VITALS — BODY MASS INDEX: 32.19 KG/M2 | SYSTOLIC BLOOD PRESSURE: 110 MMHG | WEIGHT: 176 LBS | DIASTOLIC BLOOD PRESSURE: 70 MMHG

## 2021-09-14 DIAGNOSIS — Z36.85 ANTENATAL SCREENING FOR STREPTOCOCCUS B: Primary | ICD-10-CM

## 2021-09-14 DIAGNOSIS — Z3A.36 36 WEEKS GESTATION OF PREGNANCY: ICD-10-CM

## 2021-09-14 PROCEDURE — 87150 DNA/RNA AMPLIFIED PROBE: CPT | Performed by: OBSTETRICS & GYNECOLOGY

## 2021-09-14 PROCEDURE — PNV: Performed by: OBSTETRICS & GYNECOLOGY

## 2021-09-14 NOTE — PROGRESS NOTES
doing well  Patient has an estimated fetal weight at  Center next week  Positive fetal movement  GBS culture done today

## 2021-09-16 LAB — GP B STREP DNA SPEC QL NAA+PROBE: NEGATIVE

## 2021-09-23 ENCOUNTER — ULTRASOUND (OUTPATIENT)
Dept: PERINATAL CARE | Facility: CLINIC | Age: 36
End: 2021-09-23
Payer: COMMERCIAL

## 2021-09-23 VITALS
BODY MASS INDEX: 32.5 KG/M2 | WEIGHT: 176.6 LBS | HEART RATE: 93 BPM | HEIGHT: 62 IN | SYSTOLIC BLOOD PRESSURE: 118 MMHG | DIASTOLIC BLOOD PRESSURE: 72 MMHG

## 2021-09-23 DIAGNOSIS — Z3A.37 37 WEEKS GESTATION OF PREGNANCY: Primary | ICD-10-CM

## 2021-09-23 DIAGNOSIS — O09.523 AMA (ADVANCED MATERNAL AGE) MULTIGRAVIDA 35+, THIRD TRIMESTER: ICD-10-CM

## 2021-09-23 PROCEDURE — 99213 OFFICE O/P EST LOW 20 MIN: CPT | Performed by: OBSTETRICS & GYNECOLOGY

## 2021-09-23 PROCEDURE — 76816 OB US FOLLOW-UP PER FETUS: CPT | Performed by: OBSTETRICS & GYNECOLOGY

## 2021-09-28 ENCOUNTER — ROUTINE PRENATAL (OUTPATIENT)
Dept: OBGYN CLINIC | Facility: CLINIC | Age: 36
End: 2021-09-28

## 2021-09-28 VITALS — BODY MASS INDEX: 32.37 KG/M2 | WEIGHT: 177 LBS | SYSTOLIC BLOOD PRESSURE: 130 MMHG | DIASTOLIC BLOOD PRESSURE: 84 MMHG

## 2021-09-28 DIAGNOSIS — Z3A.38 38 WEEKS GESTATION OF PREGNANCY: Primary | ICD-10-CM

## 2021-09-28 PROCEDURE — PNV: Performed by: OBSTETRICS & GYNECOLOGY

## 2021-09-28 NOTE — PROGRESS NOTES
GBS neg  Patient was informed of her GBS results  No bleeding  The patient has a lot of pelvic pressure  No nausea, vomiting, headache or dizziness

## 2021-09-28 NOTE — PROGRESS NOTES
EFW at Maternal Fetal Medicine 6 lbs 11 oz on 9/23/2021  Increased pressure, no bleeding, no leaking

## 2021-10-05 ENCOUNTER — ROUTINE PRENATAL (OUTPATIENT)
Dept: OBGYN CLINIC | Facility: CLINIC | Age: 36
End: 2021-10-05

## 2021-10-05 VITALS — BODY MASS INDEX: 32.56 KG/M2 | DIASTOLIC BLOOD PRESSURE: 80 MMHG | SYSTOLIC BLOOD PRESSURE: 130 MMHG | WEIGHT: 178 LBS

## 2021-10-05 DIAGNOSIS — Z3A.39 39 WEEKS GESTATION OF PREGNANCY: Primary | ICD-10-CM

## 2021-10-05 PROCEDURE — PNV: Performed by: OBSTETRICS & GYNECOLOGY

## 2021-10-10 PROBLEM — Z3A.37 37 WEEKS GESTATION OF PREGNANCY: Status: ACTIVE | Noted: 2021-10-10

## 2021-10-12 ENCOUNTER — ROUTINE PRENATAL (OUTPATIENT)
Dept: OBGYN CLINIC | Facility: CLINIC | Age: 36
End: 2021-10-12
Payer: COMMERCIAL

## 2021-10-12 VITALS — DIASTOLIC BLOOD PRESSURE: 82 MMHG | SYSTOLIC BLOOD PRESSURE: 140 MMHG | BODY MASS INDEX: 32.37 KG/M2 | WEIGHT: 177 LBS

## 2021-10-12 DIAGNOSIS — Z3A.40 40 WEEKS GESTATION OF PREGNANCY: Primary | ICD-10-CM

## 2021-10-12 PROCEDURE — 76815 OB US LIMITED FETUS(S): CPT | Performed by: OBSTETRICS & GYNECOLOGY

## 2021-10-12 PROCEDURE — PNV: Performed by: OBSTETRICS & GYNECOLOGY

## 2021-10-12 PROCEDURE — 59025 FETAL NON-STRESS TEST: CPT | Performed by: OBSTETRICS & GYNECOLOGY

## 2021-10-14 ENCOUNTER — ANESTHESIA EVENT (INPATIENT)
Dept: ANESTHESIOLOGY | Facility: HOSPITAL | Age: 36
End: 2021-10-14
Payer: COMMERCIAL

## 2021-10-14 ENCOUNTER — HOSPITAL ENCOUNTER (INPATIENT)
Facility: HOSPITAL | Age: 36
LOS: 2 days | Discharge: HOME | End: 2021-10-16
Attending: OBSTETRICS & GYNECOLOGY | Admitting: OBSTETRICS & GYNECOLOGY
Payer: COMMERCIAL

## 2021-10-14 ENCOUNTER — HOSPITAL ENCOUNTER (OUTPATIENT)
Dept: LABOR AND DELIVERY | Facility: HOSPITAL | Age: 36
Discharge: HOME | End: 2021-10-14
Payer: COMMERCIAL

## 2021-10-14 ENCOUNTER — ANESTHESIA (INPATIENT)
Dept: ANESTHESIOLOGY | Facility: HOSPITAL | Age: 36
End: 2021-10-14
Payer: COMMERCIAL

## 2021-10-14 PROBLEM — Z34.90 ENCOUNTER FOR ELECTIVE INDUCTION OF LABOR: Status: ACTIVE | Noted: 2021-10-14

## 2021-10-14 LAB
ABO GROUP BLD: NORMAL
BASE EXCESS BLDCOA CALC-SCNC: -1.6 MMOL/L (ref 3–11)
BASE EXCESS BLDCOV CALC-SCNC: -0.8 MMOL/L (ref 1–9)
BASOPHILS # BLD AUTO: 0.02 THOUSANDS/ΜL (ref 0–0.1)
BASOPHILS NFR BLD AUTO: 0 % (ref 0–1)
BLD GP AB SCN SERPL QL: NEGATIVE
EOSINOPHIL # BLD AUTO: 0.09 THOUSAND/ΜL (ref 0–0.61)
EOSINOPHIL NFR BLD AUTO: 1 % (ref 0–6)
ERYTHROCYTE [DISTWIDTH] IN BLOOD BY AUTOMATED COUNT: 13.5 % (ref 11.6–15.1)
HCO3 BLDCOA-SCNC: 27.3 MMOL/L (ref 17.3–27.3)
HCO3 BLDCOV-SCNC: 24.9 MMOL/L (ref 12.2–28.6)
HCT VFR BLD AUTO: 39 % (ref 34.8–46.1)
HGB BLD-MCNC: 12.9 G/DL (ref 11.5–15.4)
IMM GRANULOCYTES # BLD AUTO: 0.05 THOUSAND/UL (ref 0–0.2)
IMM GRANULOCYTES NFR BLD AUTO: 1 % (ref 0–2)
LYMPHOCYTES # BLD AUTO: 2.06 THOUSANDS/ΜL (ref 0.6–4.47)
LYMPHOCYTES NFR BLD AUTO: 24 % (ref 14–44)
MCH RBC QN AUTO: 30.4 PG (ref 26.8–34.3)
MCHC RBC AUTO-ENTMCNC: 33.1 G/DL (ref 31.4–37.4)
MCV RBC AUTO: 92 FL (ref 82–98)
MONOCYTES # BLD AUTO: 0.68 THOUSAND/ΜL (ref 0.17–1.22)
MONOCYTES NFR BLD AUTO: 8 % (ref 4–12)
NEUTROPHILS # BLD AUTO: 5.75 THOUSANDS/ΜL (ref 1.85–7.62)
NEUTS SEG NFR BLD AUTO: 66 % (ref 43–75)
NRBC BLD AUTO-RTO: 0 /100 WBCS
O2 CT VFR BLDCOA CALC: 17.3 ML/DL
OXYHGB MFR BLDCOA: 68.5 %
OXYHGB MFR BLDCOV: 59.9 %
PCO2 BLDCOA: 62.2 MM[HG] (ref 30–60)
PCO2 BLDCOV: 44.6 MM HG (ref 27–43)
PH BLDCOA: 7.26 [PH] (ref 7.23–7.43)
PH BLDCOV: 7.36 [PH] (ref 7.19–7.49)
PLATELET # BLD AUTO: 249 THOUSANDS/UL (ref 149–390)
PMV BLD AUTO: 10.4 FL (ref 8.9–12.7)
PO2 BLDCOA: 30.8 MM HG (ref 5–25)
PO2 BLDCOV: 23.5 MM HG (ref 15–45)
RBC # BLD AUTO: 4.25 MILLION/UL (ref 3.81–5.12)
RH BLD: NEGATIVE
RPR SER QL: NORMAL
SAO2 % BLDCOV: 14.4 ML/DL
SPECIMEN EXPIRATION DATE: NORMAL
WBC # BLD AUTO: 8.65 THOUSAND/UL (ref 4.31–10.16)

## 2021-10-14 PROCEDURE — 86850 RBC ANTIBODY SCREEN: CPT | Performed by: OBSTETRICS & GYNECOLOGY

## 2021-10-14 PROCEDURE — 59400 OBSTETRICAL CARE: CPT | Performed by: OBSTETRICS & GYNECOLOGY

## 2021-10-14 PROCEDURE — 10907ZC DRAINAGE OF AMNIOTIC FLUID, THERAPEUTIC FROM PRODUCTS OF CONCEPTION, VIA NATURAL OR ARTIFICIAL OPENING: ICD-10-PCS | Performed by: OBSTETRICS & GYNECOLOGY

## 2021-10-14 PROCEDURE — 99024 POSTOP FOLLOW-UP VISIT: CPT | Performed by: OBSTETRICS & GYNECOLOGY

## 2021-10-14 PROCEDURE — NC001 PR NO CHARGE: Performed by: OBSTETRICS & GYNECOLOGY

## 2021-10-14 PROCEDURE — 86592 SYPHILIS TEST NON-TREP QUAL: CPT | Performed by: OBSTETRICS & GYNECOLOGY

## 2021-10-14 PROCEDURE — 82805 BLOOD GASES W/O2 SATURATION: CPT | Performed by: OBSTETRICS & GYNECOLOGY

## 2021-10-14 PROCEDURE — 86901 BLOOD TYPING SEROLOGIC RH(D): CPT | Performed by: OBSTETRICS & GYNECOLOGY

## 2021-10-14 PROCEDURE — 86900 BLOOD TYPING SEROLOGIC ABO: CPT | Performed by: OBSTETRICS & GYNECOLOGY

## 2021-10-14 PROCEDURE — 85025 COMPLETE CBC W/AUTO DIFF WBC: CPT | Performed by: OBSTETRICS & GYNECOLOGY

## 2021-10-14 RX ORDER — METOCLOPRAMIDE HYDROCHLORIDE 5 MG/ML
10 INJECTION INTRAMUSCULAR; INTRAVENOUS EVERY 6 HOURS PRN
Status: DISCONTINUED | OUTPATIENT
Start: 2021-10-14 | End: 2021-10-14

## 2021-10-14 RX ORDER — CALCIUM CARBONATE 200(500)MG
1000 TABLET,CHEWABLE ORAL DAILY PRN
Status: DISCONTINUED | OUTPATIENT
Start: 2021-10-14 | End: 2021-10-16 | Stop reason: HOSPADM

## 2021-10-14 RX ORDER — DIPHENHYDRAMINE HYDROCHLORIDE 50 MG/ML
25 INJECTION INTRAMUSCULAR; INTRAVENOUS EVERY 6 HOURS PRN
Status: CANCELLED | OUTPATIENT
Start: 2021-10-14

## 2021-10-14 RX ORDER — ONDANSETRON 2 MG/ML
4 INJECTION INTRAMUSCULAR; INTRAVENOUS EVERY 4 HOURS PRN
Status: CANCELLED | OUTPATIENT
Start: 2021-10-14 | End: 2021-10-16

## 2021-10-14 RX ORDER — ROPIVACAINE HYDROCHLORIDE 2 MG/ML
INJECTION, SOLUTION EPIDURAL; INFILTRATION; PERINEURAL AS NEEDED
Status: DISCONTINUED | OUTPATIENT
Start: 2021-10-14 | End: 2021-10-17 | Stop reason: HOSPADM

## 2021-10-14 RX ORDER — SODIUM CHLORIDE, SODIUM LACTATE, POTASSIUM CHLORIDE, CALCIUM CHLORIDE 600; 310; 30; 20 MG/100ML; MG/100ML; MG/100ML; MG/100ML
125 INJECTION, SOLUTION INTRAVENOUS CONTINUOUS
Status: DISCONTINUED | OUTPATIENT
Start: 2021-10-14 | End: 2021-10-14

## 2021-10-14 RX ORDER — ACETAMINOPHEN 325 MG/1
650 TABLET ORAL EVERY 4 HOURS PRN
Status: DISCONTINUED | OUTPATIENT
Start: 2021-10-14 | End: 2021-10-14

## 2021-10-14 RX ORDER — DIAPER,BRIEF,INFANT-TODD,DISP
1 EACH MISCELLANEOUS 4 TIMES DAILY PRN
Status: DISCONTINUED | OUTPATIENT
Start: 2021-10-14 | End: 2021-10-16 | Stop reason: HOSPADM

## 2021-10-14 RX ORDER — LIDOCAINE HYDROCHLORIDE AND EPINEPHRINE 15; 5 MG/ML; UG/ML
INJECTION, SOLUTION EPIDURAL
Status: COMPLETED | OUTPATIENT
Start: 2021-10-14 | End: 2021-10-14

## 2021-10-14 RX ORDER — UREA 10 %
800 LOTION (ML) TOPICAL DAILY
COMMUNITY

## 2021-10-14 RX ORDER — OMEGA-3-ACID ETHYL ESTERS 1 G/1
100 CAPSULE, LIQUID FILLED ORAL DAILY
COMMUNITY

## 2021-10-14 RX ORDER — DOCUSATE SODIUM 100 MG/1
100 CAPSULE, LIQUID FILLED ORAL 2 TIMES DAILY
Status: DISCONTINUED | OUTPATIENT
Start: 2021-10-14 | End: 2021-10-16 | Stop reason: HOSPADM

## 2021-10-14 RX ORDER — IBUPROFEN 600 MG/1
600 TABLET ORAL EVERY 6 HOURS PRN
Status: DISCONTINUED | OUTPATIENT
Start: 2021-10-14 | End: 2021-10-16 | Stop reason: HOSPADM

## 2021-10-14 RX ORDER — ACETAMINOPHEN 325 MG/1
650 TABLET ORAL EVERY 6 HOURS PRN
Status: DISCONTINUED | OUTPATIENT
Start: 2021-10-14 | End: 2021-10-16 | Stop reason: HOSPADM

## 2021-10-14 RX ORDER — ONDANSETRON 2 MG/ML
4 INJECTION INTRAMUSCULAR; INTRAVENOUS EVERY 4 HOURS PRN
Status: DISCONTINUED | OUTPATIENT
Start: 2021-10-14 | End: 2021-10-14

## 2021-10-14 RX ORDER — OXYTOCIN/RINGER'S LACTATE 30/500 ML
1-30 PLASTIC BAG, INJECTION (ML) INTRAVENOUS
Status: DISCONTINUED | OUTPATIENT
Start: 2021-10-14 | End: 2021-10-14

## 2021-10-14 RX ORDER — DIPHENHYDRAMINE HCL 25 MG
25 TABLET ORAL EVERY 6 HOURS PRN
Status: DISCONTINUED | OUTPATIENT
Start: 2021-10-14 | End: 2021-10-16 | Stop reason: HOSPADM

## 2021-10-14 RX ORDER — NALBUPHINE HCL 10 MG/ML
5 AMPUL (ML) INJECTION
Status: CANCELLED | OUTPATIENT
Start: 2021-10-14

## 2021-10-14 RX ORDER — ONDANSETRON 2 MG/ML
4 INJECTION INTRAMUSCULAR; INTRAVENOUS EVERY 8 HOURS PRN
Status: DISCONTINUED | OUTPATIENT
Start: 2021-10-14 | End: 2021-10-16 | Stop reason: HOSPADM

## 2021-10-14 RX ORDER — DIPHENOXYLATE HYDROCHLORIDE AND ATROPINE SULFATE 2.5; .025 MG/1; MG/1
1 TABLET ORAL DAILY
COMMUNITY

## 2021-10-14 RX ORDER — CALCIUM CARBONATE 200(500)MG
1000 TABLET,CHEWABLE ORAL DAILY PRN
Status: DISCONTINUED | OUTPATIENT
Start: 2021-10-14 | End: 2021-10-14

## 2021-10-14 RX ADMIN — ROPIVACAINE HYDROCHLORIDE: 2 INJECTION, SOLUTION EPIDURAL; INFILTRATION at 15:22

## 2021-10-14 RX ADMIN — Medication 2 MILLI-UNITS/MIN: at 08:50

## 2021-10-14 RX ADMIN — LIDOCAINE HYDROCHLORIDE AND EPINEPHRINE 3 ML: 15; 5 INJECTION, SOLUTION EPIDURAL at 15:09

## 2021-10-14 RX ADMIN — ROPIVACAINE HYDROCHLORIDE 5 MG: 2 INJECTION, SOLUTION EPIDURAL; INFILTRATION at 15:16

## 2021-10-14 RX ADMIN — SODIUM CHLORIDE, SODIUM LACTATE, POTASSIUM CHLORIDE, AND CALCIUM CHLORIDE 999 ML/HR: .6; .31; .03; .02 INJECTION, SOLUTION INTRAVENOUS at 15:03

## 2021-10-14 RX ADMIN — WITCH HAZEL 1 PAD: 500 SOLUTION RECTAL; TOPICAL at 19:07

## 2021-10-14 RX ADMIN — LIDOCAINE HYDROCHLORIDE AND EPINEPHRINE 2 ML: 15; 5 INJECTION, SOLUTION EPIDURAL at 15:10

## 2021-10-14 RX ADMIN — Medication: at 19:07

## 2021-10-14 RX ADMIN — HYDROCORTISONE 1 APPLICATION: 1 CREAM TOPICAL at 19:07

## 2021-10-14 RX ADMIN — SODIUM CHLORIDE, SODIUM LACTATE, POTASSIUM CHLORIDE, AND CALCIUM CHLORIDE 999 ML/HR: .6; .31; .03; .02 INJECTION, SOLUTION INTRAVENOUS at 08:20

## 2021-10-14 RX ADMIN — DOCUSATE SODIUM 100 MG: 100 CAPSULE ORAL at 19:08

## 2021-10-14 RX ADMIN — SODIUM CHLORIDE, SODIUM LACTATE, POTASSIUM CHLORIDE, AND CALCIUM CHLORIDE 125 ML/HR: .6; .31; .03; .02 INJECTION, SOLUTION INTRAVENOUS at 09:15

## 2021-10-15 LAB
ABO GROUP BLD: NORMAL
BLD GP AB SCN SERPL QL: NEGATIVE
FETAL CELL SCN BLD QL ROSETTE: NEGATIVE
RH BLD: NEGATIVE

## 2021-10-15 PROCEDURE — 86900 BLOOD TYPING SEROLOGIC ABO: CPT

## 2021-10-15 PROCEDURE — 86901 BLOOD TYPING SEROLOGIC RH(D): CPT

## 2021-10-15 PROCEDURE — 85461 HEMOGLOBIN FETAL: CPT

## 2021-10-15 PROCEDURE — 99024 POSTOP FOLLOW-UP VISIT: CPT | Performed by: OBSTETRICS & GYNECOLOGY

## 2021-10-15 PROCEDURE — 86850 RBC ANTIBODY SCREEN: CPT

## 2021-10-15 RX ADMIN — DOCUSATE SODIUM 100 MG: 100 CAPSULE ORAL at 09:08

## 2021-10-15 RX ADMIN — DOCUSATE SODIUM 100 MG: 100 CAPSULE ORAL at 18:01

## 2021-10-15 RX ADMIN — HUMAN RHO(D) IMMUNE GLOBULIN 300 MCG: 300 INJECTION, SOLUTION INTRAMUSCULAR at 21:26

## 2021-10-16 VITALS
SYSTOLIC BLOOD PRESSURE: 117 MMHG | WEIGHT: 177 LBS | OXYGEN SATURATION: 97 % | HEIGHT: 62 IN | HEART RATE: 77 BPM | BODY MASS INDEX: 32.57 KG/M2 | RESPIRATION RATE: 16 BRPM | DIASTOLIC BLOOD PRESSURE: 62 MMHG | TEMPERATURE: 98.5 F

## 2021-10-16 PROCEDURE — 90471 IMMUNIZATION ADMIN: CPT | Performed by: OBSTETRICS & GYNECOLOGY

## 2021-10-16 PROCEDURE — 99024 POSTOP FOLLOW-UP VISIT: CPT | Performed by: OBSTETRICS & GYNECOLOGY

## 2021-10-16 PROCEDURE — 90686 IIV4 VACC NO PRSV 0.5 ML IM: CPT | Performed by: OBSTETRICS & GYNECOLOGY

## 2021-10-16 RX ADMIN — INFLUENZA VIRUS VACCINE 0.5 ML: 15; 15; 15; 15 SUSPENSION INTRAMUSCULAR at 10:46

## 2021-10-16 RX ADMIN — DOCUSATE SODIUM 100 MG: 100 CAPSULE ORAL at 10:46

## 2021-10-21 ENCOUNTER — OFFICE VISIT (OUTPATIENT)
Dept: POSTPARTUM | Facility: CLINIC | Age: 36
End: 2021-10-21
Payer: COMMERCIAL

## 2021-10-21 VITALS — SYSTOLIC BLOOD PRESSURE: 132 MMHG | DIASTOLIC BLOOD PRESSURE: 88 MMHG

## 2021-10-21 DIAGNOSIS — Z71.89 ENCOUNTER FOR BREAST FEEDING COUNSELING: Primary | ICD-10-CM

## 2021-10-21 DIAGNOSIS — R52 PAIN AGGRAVATED BY BREAST FEEDING: ICD-10-CM

## 2021-10-21 DIAGNOSIS — O92.13 CRACKED NIPPLE ASSOCIATED WITH LACTATION: ICD-10-CM

## 2021-10-21 DIAGNOSIS — O92.79 PAIN AGGRAVATED BY BREAST FEEDING: ICD-10-CM

## 2021-10-21 LAB — PLACENTA IN STORAGE: NORMAL

## 2021-10-21 PROCEDURE — 99404 PREV MED CNSL INDIV APPRX 60: CPT | Performed by: PEDIATRICS

## 2021-10-21 RX ORDER — CHLORHEXIDINE GLUCONATE 4 %
1 LIQUID (ML) TOPICAL WEEKLY
COMMUNITY

## 2021-10-30 ENCOUNTER — IMMUNIZATIONS (OUTPATIENT)
Dept: FAMILY MEDICINE CLINIC | Facility: HOSPITAL | Age: 36
End: 2021-10-30

## 2021-10-30 DIAGNOSIS — Z23 ENCOUNTER FOR IMMUNIZATION: Primary | ICD-10-CM

## 2021-10-30 PROCEDURE — 0001A COVID-19 PFIZER VACC 0.3 ML: CPT

## 2021-10-30 PROCEDURE — 91300 COVID-19 PFIZER VACC 0.3 ML: CPT

## 2021-11-03 ENCOUNTER — POSTPARTUM VISIT (OUTPATIENT)
Dept: OBGYN CLINIC | Facility: CLINIC | Age: 36
End: 2021-11-03

## 2021-11-03 VITALS
BODY MASS INDEX: 28.89 KG/M2 | WEIGHT: 157 LBS | DIASTOLIC BLOOD PRESSURE: 68 MMHG | SYSTOLIC BLOOD PRESSURE: 106 MMHG | HEIGHT: 62 IN

## 2021-11-03 PROCEDURE — 99024 POSTOP FOLLOW-UP VISIT: CPT | Performed by: OBSTETRICS & GYNECOLOGY

## 2021-11-04 ENCOUNTER — OFFICE VISIT (OUTPATIENT)
Dept: POSTPARTUM | Facility: CLINIC | Age: 36
End: 2021-11-04
Payer: COMMERCIAL

## 2021-11-04 DIAGNOSIS — Z71.89 ENCOUNTER FOR BREAST FEEDING COUNSELING: Primary | ICD-10-CM

## 2021-11-04 PROCEDURE — 99404 PREV MED CNSL INDIV APPRX 60: CPT | Performed by: PEDIATRICS

## 2021-11-29 ENCOUNTER — POSTPARTUM VISIT (OUTPATIENT)
Dept: OBGYN CLINIC | Facility: CLINIC | Age: 36
End: 2021-11-29

## 2021-11-29 VITALS
BODY MASS INDEX: 28.71 KG/M2 | SYSTOLIC BLOOD PRESSURE: 120 MMHG | WEIGHT: 156 LBS | HEIGHT: 62 IN | DIASTOLIC BLOOD PRESSURE: 80 MMHG

## 2021-11-29 DIAGNOSIS — Z30.011 ENCOUNTER FOR INITIAL PRESCRIPTION OF CONTRACEPTIVE PILLS: Primary | ICD-10-CM

## 2021-11-29 PROCEDURE — 99024 POSTOP FOLLOW-UP VISIT: CPT | Performed by: OBSTETRICS & GYNECOLOGY

## 2021-11-29 RX ORDER — ACETAMINOPHEN AND CODEINE PHOSPHATE 120; 12 MG/5ML; MG/5ML
1 SOLUTION ORAL DAILY
Qty: 90 TABLET | Refills: 3 | Status: SHIPPED | OUTPATIENT
Start: 2021-11-29

## 2022-08-11 ENCOUNTER — APPOINTMENT (OUTPATIENT)
Dept: LAB | Facility: MEDICAL CENTER | Age: 37
End: 2022-08-11

## 2022-08-11 DIAGNOSIS — Z00.8 HEALTH EXAMINATION IN POPULATION SURVEY: ICD-10-CM

## 2022-08-11 LAB
CHOLEST SERPL-MCNC: 211 MG/DL
EST. AVERAGE GLUCOSE BLD GHB EST-MCNC: 108 MG/DL
HBA1C MFR BLD: 5.4 %
HDLC SERPL-MCNC: 64 MG/DL
LDLC SERPL CALC-MCNC: 118 MG/DL (ref 0–100)
NONHDLC SERPL-MCNC: 147 MG/DL
TRIGL SERPL-MCNC: 143 MG/DL

## 2022-08-11 PROCEDURE — 36415 COLL VENOUS BLD VENIPUNCTURE: CPT

## 2022-08-11 PROCEDURE — 83036 HEMOGLOBIN GLYCOSYLATED A1C: CPT

## 2022-08-11 PROCEDURE — 80061 LIPID PANEL: CPT

## 2022-08-17 ENCOUNTER — OFFICE VISIT (OUTPATIENT)
Dept: FAMILY MEDICINE CLINIC | Facility: CLINIC | Age: 37
End: 2022-08-17
Payer: COMMERCIAL

## 2022-08-17 VITALS
HEIGHT: 62 IN | TEMPERATURE: 98.3 F | DIASTOLIC BLOOD PRESSURE: 80 MMHG | HEART RATE: 83 BPM | OXYGEN SATURATION: 98 % | BODY MASS INDEX: 26.35 KG/M2 | WEIGHT: 143.2 LBS | SYSTOLIC BLOOD PRESSURE: 110 MMHG | RESPIRATION RATE: 16 BRPM

## 2022-08-17 DIAGNOSIS — Z00.00 ANNUAL PHYSICAL EXAM: Primary | ICD-10-CM

## 2022-08-17 DIAGNOSIS — Z12.4 CERVICAL CANCER SCREENING: ICD-10-CM

## 2022-08-17 DIAGNOSIS — N64.4 MASTALGIA: ICD-10-CM

## 2022-08-17 PROCEDURE — 99385 PREV VISIT NEW AGE 18-39: CPT | Performed by: FAMILY MEDICINE

## 2022-08-17 NOTE — ASSESSMENT & PLAN NOTE
Pain in the right breast x 2 weeks   Stopped nursing 2 months ago and denies nipple discharge, bleeding, warmth, swelling, or mass   Did appreciate a tender mass at the 10-11 o clock position   Will order U/S  Last mammogram and U/S in 2020 for similar complaints showed diffuse round calcifications with a benign distribution and morphology    Recc NSAIDS and warm compresses

## 2022-08-17 NOTE — ASSESSMENT & PLAN NOTE
BMI Counseling: Body mass index is 26 19 kg/m²  Has come down form a BMI of 32  The BMI is above normal  Nutrition recommendations include reducing portion sizes, decreasing overall calorie intake, 3-5 servings of fruits/vegetables daily, reducing fast food intake, consuming healthier snacks, moderation in carbohydrate intake and reducing intake of cholesterol  Exercise recommendations include moderate aerobic physical activity for 150 minutes/week

## 2022-08-17 NOTE — ASSESSMENT & PLAN NOTE
Doing well  Encourage her to exercise and to consume less fats and cholesterol  Lipid panel was mildly elevate and will repeat in 1 year

## 2022-08-17 NOTE — PROGRESS NOTES
[address]    NAME: Kiko Mejia  AGE: 39 y o  SEX: female  : 1985     DATE: 2022     Assessment and Plan:     Problem List Items Addressed This Visit        Other    BMI 26 0-26 9,adult     BMI Counseling: Body mass index is 26 19 kg/m²  Has come down form a BMI of 32  The BMI is above normal  Nutrition recommendations include reducing portion sizes, decreasing overall calorie intake, 3-5 servings of fruits/vegetables daily, reducing fast food intake, consuming healthier snacks, moderation in carbohydrate intake and reducing intake of cholesterol  Exercise recommendations include moderate aerobic physical activity for 150 minutes/week  Mastalgia     Pain in the right breast x 2 weeks   Stopped nursing 2 months ago and denies nipple discharge, bleeding, warmth, swelling, or mass   Did appreciate a tender mass at the 10-11 o clock position   Will order U/S  Last mammogram and U/S in 2020 for similar complaints showed diffuse round calcifications with a benign distribution and morphology  Recc NSAIDS and warm compresses          Relevant Orders    US breast right limited (diagnostic)    Annual physical exam - Primary     Doing well  Encourage her to exercise and to consume less fats and cholesterol  Lipid panel was mildly elevate and will repeat in 1 year  Cervical cancer screening     Last pap in  was negative for HPV and cystology   Next one due in                Immunizations and preventive care screenings were discussed with patient today  Appropriate education was printed on patient's after visit summary  Counseling:  Exercise: the importance of regular exercise/physical activity was discussed  Recommend exercise 3-5 times per week for at least 30 minutes  BMI Counseling: Body mass index is 26 19 kg/m²   The BMI is above normal  Nutrition recommendations include encouraging healthy choices of fruits and vegetables, decreasing fast food intake, limiting drinks that contain sugar, moderation in carbohydrate intake, increasing intake of lean protein, reducing intake of saturated and trans fat and reducing intake of cholesterol  Exercise recommendations include moderate physical activity 150 minutes/week  No pharmacotherapy was ordered  Rationale for BMI follow-up plan is due to patient being overweight or obese  Depression Screening and Follow-up Plan: Patient was screened for depression during today's encounter  They screened negative with a PHQ-2 score of 0  No follow-ups on file  Chief Complaint:     Chief Complaint   Patient presents with    Annual Exam     Pain in right breast      History of Present Illness:     Adult Annual Physical   Patient here as a new patient for a comprehensive physical exam and to evaluate right breast pain  Last PCP was Dr Shayy Feliciano   Breast pain started 2 week ago  Pain radiates the laterally to the axilla  Has had similar pain int he past  Breast U/S and mamogram in 2020 showed diffuse, round calcifications in both breast with a benign distribution and morphology  Denies nipple disharge, mass, redness, or swelling  Has a 10 month and stopped nursing 2 month ago  No injuries to the breast  LMP 2 weeks ago  Has tried tyelenol and warm compresses with minimal improvement  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)     Diet and Physical Activity  Diet/Nutrition: follows a vegetarian diet, balanced   Exercise: just purchased a tonal and startng ot work out   Depression Screening  PHQ-2/9 Depression Screening    Little interest or pleasure in doing things: 0 - not at all  Feeling down, depressed, or hopeless: 0 - not at all  PHQ-2 Score: 0  PHQ-2 Interpretation: Negative depression screen       General Health  Sleep: 5-6 hour sof sleep  Between school and the kids, she doesnt get much sleep     Hearing: normal - bilateral   Vision: wears glasses, wears contacts and saw the optometrist last year   Dental: regular dental visits and was last seen in october   /GYN Health  Last menstrual period: 2 weeks ago  Contraceptive method: no longer on micronor   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   History of STDs?: no     Review of Systems:     Review of Systems   Constitutional: Negative for fever  Respiratory:        Right breast pain    Musculoskeletal: Positive for back pain (chronic )  Skin: Negative for rash and wound  Psychiatric/Behavioral: Positive for sleep disturbance  Past Medical History:     Past Medical History:   Diagnosis Date    Abnormal Pap smear of cervix     Back pain     GERD (gastroesophageal reflux disease)     none presantly    Gestational diabetes w/ 1st pregnancy    Helicobacter pylori (H  pylori) infection cleared after treatment    Varicella     as child      Past Surgical History:     Past Surgical History:   Procedure Laterality Date    CERVICAL BIOPSY  W/ LOOP ELECTRODE EXCISION          [place] ESOPHAGOGASTRODUODENOSCOPY TRANSORAL DIAGNOSTIC N/A 2016    Procedure: ESOPHAGOGASTRODUODENOSCOPY (EGD); Surgeon: Asmita Vargas MD;  Location: [facility];   Service: Gastroenterology    WISDOM TOOTH EXTRACTION Bilateral       Social History:     Social History     Socioeconomic History    Marital status: /Civil Union     Spouse name: Kim Priec Number of children: 2    Years of education: None    Highest education level: Associate degree: academic program   Occupational History     Comment: Works at the [employer]    Smoking status: Former Smoker     Packs/day: 0 25     Years: 8 00     Pack years: 2 00     Types: Cigarettes     Quit date: 2017     Years since quittin 6    Smokeless tobacco: Never Used   Vaping Use    Vaping Use: Never used   Substance and Sexual Activity    Alcohol use: Yes     Comment: social    Drug use: No    Sexual activity: Not Currently Partners: Male     Birth control/protection: Condom Male     Comment: Brandon   Other Topics Concern    None   Social History Narrative    None     Social Determinants of Health     Financial Resource Strain: Not on file   Food Insecurity: Not on file   Transportation Needs: Not on file   Physical Activity: Not on file   Stress: Not on file   Social Connections: Not on file   Intimate Partner Violence: Not on file   Housing Stability: Not on file      Family History:     Family History   Problem Relation Age of Onset    Depression Father     Hyperlipidemia Father     Depression Brother     Mental illness Family         anxiety    Cancer Maternal Grandfather         bile duct cancer/stomach ca    Hyperlipidemia Mother     No Known Problems Maternal Grandmother     Heart disease Paternal Grandfather         OMI Sanz No Known Problems Daughter       Current Medications:     Current Outpatient Medications   Medication Sig Dispense Refill    Cyanocobalamin (Vitamin B12 TR) 2000 MCG TBCR Take 1 Dose by mouth once a week      multivitamin (THERAGRAN) TABS Take 1 tablet by mouth daily PRENATAL VITAMIN      omega-3-acid ethyl esters (LOVAZA) 1 g capsule Take 100 mg by mouth daily OMEGA +  DHA= 500MG  - KELT      VITAMIN D, CHOLECALCIFEROL, PO Take 2,000 Units by mouth daily       No current facility-administered medications for this visit  Allergies:     No Known Allergies   Physical Exam:     /80 (BP Location: Left arm, Patient Position: Sitting, Cuff Size: Standard)   Pulse 83   Temp 98 3 °F (36 8 °C) (Temporal)   Resp 16   Ht 5' 2" (1 575 m)   Wt 65 kg (143 lb 3 2 oz)   SpO2 98%   BMI 26 19 kg/m²     Physical Exam  Vitals reviewed  Constitutional:       General: She is not in acute distress  Appearance: Normal appearance  HENT:      Head: Normocephalic and atraumatic        Right Ear: Tympanic membrane normal       Left Ear: Tympanic membrane normal    Eyes:      Extraocular Movements: Extraocular movements intact  Cardiovascular:      Rate and Rhythm: Normal rate and regular rhythm  Heart sounds: No murmur heard  Pulmonary:      Effort: Pulmonary effort is normal  No respiratory distress  Breath sounds: No stridor  No wheezing, rhonchi or rales  Chest:      Chest wall: Mass and tenderness present  No swelling or edema  Breasts:      Right: Mass and tenderness present  No bleeding, inverted nipple, nipple discharge, skin change, axillary adenopathy or supraclavicular adenopathy  Comments: Small moveable mass at the 11 and 12 o'clock position  No nipple discharge or rash  No axillary mass   Lymphadenopathy:      Upper Body:      Right upper body: No supraclavicular, axillary or pectoral adenopathy  Skin:     General: Skin is warm  Findings: No bruising or erythema  Neurological:      Mental Status: She is alert and oriented to person, place, and time  Gait: Gait normal    Psychiatric:         Mood and Affect: Mood normal          Behavior: Behavior normal          Thought Content:  Thought content normal          Judgment: Judgment normal           Mili Gutierrez MD   [address] [facility]

## 2022-08-17 NOTE — PATIENT INSTRUCTIONS
Wellness Visit for Adults   AMBULATORY CARE:   A wellness visit  is when you see your healthcare provider to get screened for health problems  Your healthcare provider will also give you advice on how to stay healthy  Write down your questions so you remember to ask them  Ask your healthcare provider how often you should have a wellness visit  What happens at a wellness visit:  Your healthcare provider will ask about your health, and your family history of health problems  This includes high blood pressure, heart disease, and cancer  He or she will ask if you have symptoms that concern you, if you smoke, and about your mood  You may also be asked about your intake of medicines, supplements, food, and alcohol  Any of the following may be done: Your weight  will be checked  Your height may also be checked so your body mass index (BMI) can be calculated  Your BMI shows if you are at a healthy weight  Your blood pressure  and heart rate will be checked  Your temperature may also be checked  Blood and urine tests  may be done  Blood tests may be done to check your cholesterol levels  Abnormal cholesterol levels increase your risk for heart disease and stroke  You may also need a blood or urine test to check for diabetes if you are at increased risk  Urine tests may be done to look for signs of an infection or kidney disease  A physical exam  includes checking your heartbeat and lungs with a stethoscope  Your healthcare provider may also check your skin to look for sun damage  Screening tests  may be recommended  A screening test is done to check for diseases that may not cause symptoms  The screening tests you may need depend on your age, gender, family history, and lifestyle habits  For example, colorectal screening may be recommended if you are 48years old or older  Screening tests you need if you are a woman:   A Pap smear  is used to screen for cervical cancer   Pap smears are usually done every 3 to 5 years depending on your age  You may need them more often if you have had abnormal Pap smear test results in the past  Ask your healthcare provider how often you should have a Pap smear  A mammogram  is an x-ray of your breasts to screen for breast cancer  Experts recommend mammograms every 2 years starting at age 48 years  You may need a mammogram at age 52 years or younger if you have an increased risk for breast cancer  Talk to your healthcare provider about when you should start having mammograms and how often you need them  Vaccines you may need:   Get an influenza vaccine  every year  The influenza vaccine protects you from the flu  Several types of viruses cause the flu  The viruses change over time, so new vaccines are made each year  Get a tetanus-diphtheria (Td) booster vaccine  every 10 years  This vaccine protects you against tetanus and diphtheria  Tetanus is a severe infection that may cause painful muscle spasms and lockjaw  Diphtheria is a severe bacterial infection that causes a thick covering in the back of your mouth and throat  Get a human papillomavirus (HPV) vaccine  if you are female and aged 23 to 32 or male 23 to 24 and never received it  This vaccine protects you from HPV infection  HPV is the most common infection spread by sexual contact  HPV may also cause vaginal, penile, and anal cancers  Get a pneumococcal vaccine  if you are aged 72 years or older  The pneumococcal vaccine is an injection given to protect you from pneumococcal disease  Pneumococcal disease is an infection caused by pneumococcal bacteria  The infection may cause pneumonia, meningitis, or an ear infection  Get a shingles vaccine  if you are 60 or older, even if you have had shingles before  The shingles vaccine is an injection to protect you from the varicella-zoster virus  This is the same virus that causes chickenpox   Shingles is a painful rash that develops in people who had chickenpox or have been exposed to the virus  How to eat healthy:  My Plate is a model for planning healthy meals  It shows the types and amounts of foods that should go on your plate  Fruits and vegetables make up about half of your plate, and grains and protein make up the other half  A serving of dairy is included on the side of your plate  The amount of calories and serving sizes you need depends on your age, gender, weight, and height  Examples of healthy foods are listed below:  Eat a variety of vegetables  such as dark green, red, and orange vegetables  You can also include canned vegetables low in sodium (salt) and frozen vegetables without added butter or sauces  Eat a variety of fresh fruits , canned fruit in 100% juice, frozen fruit, and dried fruit  Include whole grains  At least half of the grains you eat should be whole grains  Examples include whole-wheat bread, wheat pasta, brown rice, and whole-grain cereals such as oatmeal     Eat a variety of protein foods such as seafood (fish and shellfish), lean meat, and poultry without skin (turkey and chicken)  Examples of lean meats include pork leg, shoulder, or tenderloin, and beef round, sirloin, tenderloin, and extra lean ground beef  Other protein foods include eggs and egg substitutes, beans, peas, soy products, nuts, and seeds  Choose low-fat dairy products such as skim or 1% milk or low-fat yogurt, cheese, and cottage cheese  Limit unhealthy fats  such as butter, hard margarine, and shortening  Exercise:  Exercise at least 30 minutes per day on most days of the week  Some examples of exercise include walking, biking, dancing, and swimming  You can also fit in more physical activity by taking the stairs instead of the elevator or parking farther away from stores  Include muscle strengthening activities 2 days each week  Regular exercise provides many health benefits   It helps you manage your weight, and decreases your risk for type 2 diabetes, heart disease, stroke, and high blood pressure  Exercise can also help improve your mood  Ask your healthcare provider about the best exercise plan for you  General health and safety guidelines:   Do not smoke  Nicotine and other chemicals in cigarettes and cigars can cause lung damage  Ask your healthcare provider for information if you currently smoke and need help to quit  E-cigarettes or smokeless tobacco still contain nicotine  Talk to your healthcare provider before you use these products  Limit alcohol  A drink of alcohol is 12 ounces of beer, 5 ounces of wine, or 1½ ounces of liquor  Lose weight, if needed  Being overweight increases your risk of certain health conditions  These include heart disease, high blood pressure, type 2 diabetes, and certain types of cancer  Protect your skin  Do not sunbathe or use tanning beds  Use sunscreen with a SPF 15 or higher  Apply sunscreen at least 15 minutes before you go outside  Reapply sunscreen every 2 hours  Wear protective clothing, hats, and sunglasses when you are outside  Drive safely  Always wear your seatbelt  Make sure everyone in your car wears a seatbelt  A seatbelt can save your life if you are in an accident  Do not use your cell phone when you are driving  This could distract you and cause an accident  Pull over if you need to make a call or send a text message  Practice safe sex  Use latex condoms if are sexually active and have more than one partner  Your healthcare provider may recommend screening tests for sexually transmitted infections (STIs)  Wear helmets, lifejackets, and protective gear  Always wear a helmet when you ride a bike or motorcycle, go skiing, or play sports that could cause a head injury  Wear protective equipment when you play sports  Wear a lifejacket when you are on a boat or doing water sports      © Copyright [employer] 2022 Information is for End User's use only and may not be sold, redistributed or otherwise used for commercial purposes  All illustrations and images included in CareNotes® are the copyrighted property of A D A M , Inc  or Nehemias Stevens  The above information is an  only  It is not intended as medical advice for individual conditions or treatments  Talk to your doctor, nurse or pharmacist before following any medical regimen to see if it is safe and effective for you

## 2022-09-19 ENCOUNTER — HOSPITAL ENCOUNTER (OUTPATIENT)
Dept: ULTRASOUND IMAGING | Facility: CLINIC | Age: 37
Discharge: HOME | End: 2022-09-19
Payer: COMMERCIAL

## 2022-09-19 ENCOUNTER — HOSPITAL ENCOUNTER (OUTPATIENT)
Dept: MAMMOGRAPHY | Facility: CLINIC | Age: 37
Discharge: HOME | End: 2022-09-19
Payer: COMMERCIAL

## 2022-09-19 VITALS — HEIGHT: 62 IN | WEIGHT: 143 LBS | BODY MASS INDEX: 26.31 KG/M2

## 2022-09-19 DIAGNOSIS — N63.10 LUMP OF BREAST, RIGHT: ICD-10-CM

## 2022-09-19 DIAGNOSIS — N64.4 MASTALGIA: ICD-10-CM

## 2022-09-19 PROCEDURE — G0279 TOMOSYNTHESIS, MAMMO: HCPCS

## 2022-09-19 PROCEDURE — 76642 ULTRASOUND BREAST LIMITED: CPT

## 2022-09-19 PROCEDURE — 77066 DX MAMMO INCL CAD BI: CPT

## 2022-10-12 PROBLEM — Z00.00 HEALTHCARE MAINTENANCE: Status: RESOLVED | Noted: 2020-12-18 | Resolved: 2022-10-12

## 2022-10-16 PROBLEM — Z12.4 CERVICAL CANCER SCREENING: Status: RESOLVED | Noted: 2022-08-17 | Resolved: 2022-10-16

## 2022-12-19 ENCOUNTER — ANNUAL EXAM (OUTPATIENT)
Dept: GYNECOLOGY | Facility: CLINIC | Age: 37
End: 2022-12-19

## 2022-12-19 VITALS
WEIGHT: 161 LBS | HEIGHT: 64 IN | DIASTOLIC BLOOD PRESSURE: 80 MMHG | BODY MASS INDEX: 27.49 KG/M2 | SYSTOLIC BLOOD PRESSURE: 120 MMHG

## 2022-12-19 DIAGNOSIS — Z01.419 ENCOUNTER FOR ANNUAL ROUTINE GYNECOLOGICAL EXAMINATION: ICD-10-CM

## 2022-12-19 DIAGNOSIS — Z30.011 ENCOUNTER FOR INITIAL PRESCRIPTION OF CONTRACEPTIVE PILLS: ICD-10-CM

## 2022-12-19 DIAGNOSIS — Z11.51 SCREENING FOR HPV (HUMAN PAPILLOMAVIRUS): ICD-10-CM

## 2022-12-19 DIAGNOSIS — Z01.419 ROUTINE GYNECOLOGICAL EXAMINATION: Primary | ICD-10-CM

## 2022-12-19 RX ORDER — NORGESTIMATE AND ETHINYL ESTRADIOL 7DAYSX3 LO
1 KIT ORAL DAILY
Qty: 90 TABLET | Refills: 0 | Status: SHIPPED | OUTPATIENT
Start: 2022-12-19

## 2022-12-19 NOTE — PROGRESS NOTES
Assessment/Plan:    Normal breast and GYN exam  Normal Pap smear 201 19  COVID-vaccine and booster  COVID infection 2022  Request for birth control  Quit smoking     Plan: Check Pap smear  Rx Ortho Tri-Cyclen Lo (25 mcg)  Recommend healthy diet and exercise  Subjective: [id number]     Patient ID: Charmayne Sin is a 44 y o  female Luis Lombardi for annual exam with no complaints  Patient would like to go on birth control pills  Present menstrual cycles are regular  Denies any pelvic pain or dyspareunia  Denies any breast bowel or bladder problems  No change in family history  Medications reviewed  Working as an office nurse, much happier, 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  Review of Systems   Constitutional: Negative  Negative for fatigue, fever and unexpected weight change  HENT: Negative  Eyes: Negative  Respiratory: Negative  Negative for chest tightness, shortness of breath, wheezing and stridor  Cardiovascular: Negative  Negative for chest pain, palpitations and leg swelling  Gastrointestinal: Negative  Negative for abdominal pain, blood in stool, diarrhea, nausea, rectal pain and vomiting  Endocrine: Negative  Genitourinary: Negative for dysuria, frequency, vaginal bleeding, vaginal discharge and vaginal pain  Musculoskeletal: Negative  Skin: Negative  Allergic/Immunologic: Negative  Neurological: Negative  Hematological: Negative  Psychiatric/Behavioral: Negative  All other systems reviewed and are negative  Objective:      /80   Ht 5' 4" (1 626 m)   Wt 73 kg (161 lb)   LMP 2022 (Exact Date)   BMI 27 64 kg/m²          Physical Exam  Constitutional:       Appearance: She is well-developed  HENT:      Head: Normocephalic and atraumatic  Neck:      Thyroid: No thyromegaly  Trachea: No tracheal deviation  Cardiovascular:      Rate and Rhythm: Normal rate and regular rhythm  Heart sounds: Normal heart sounds     Pulmonary:      Effort: Pulmonary effort is normal  No respiratory distress  Breath sounds: Normal breath sounds  No stridor  No wheezing or rales  Chest:      Chest wall: No tenderness  Breasts:     Breasts are symmetrical       Right: No inverted nipple, mass, nipple discharge, skin change or tenderness  Left: No inverted nipple, mass, nipple discharge, skin change or tenderness  Abdominal:      General: Bowel sounds are normal  There is no distension  Palpations: Abdomen is soft  There is no mass  Tenderness: There is no abdominal tenderness  There is no guarding or rebound  Hernia: No hernia is present  There is no hernia in the left inguinal area  Genitourinary:     Labia:         Right: No rash, tenderness, lesion or injury  Left: No rash, tenderness, lesion or injury  Vagina: Normal  No signs of injury and foreign body  No vaginal discharge, erythema, tenderness or bleeding  Cervix: No cervical motion tenderness, discharge or friability  Uterus: Not deviated, not enlarged, not fixed and not tender  Adnexa:         Right: No mass, tenderness or fullness  Left: No mass, tenderness or fullness  Rectum: No mass, anal fissure, external hemorrhoid or internal hemorrhoid  Musculoskeletal:      Cervical back: Normal range of motion and neck supple  Lymphadenopathy:      Lower Body: No right inguinal adenopathy  No left inguinal adenopathy  Skin:     General: Skin is warm and dry  Neurological:      Mental Status: She is alert and oriented to person, place, and time  Psychiatric:         Behavior: Behavior normal          Thought Content:  Thought content normal          Judgment: Judgment normal

## 2022-12-21 LAB
HPV HR 12 DNA CVX QL NAA+PROBE: NEGATIVE
HPV16 DNA CVX QL NAA+PROBE: NEGATIVE
HPV18 DNA CVX QL NAA+PROBE: NEGATIVE

## 2022-12-24 ENCOUNTER — OFFICE VISIT (OUTPATIENT)
Dept: URGENT CARE | Age: 37
End: 2022-12-24

## 2022-12-24 VITALS
HEART RATE: 100 BPM | OXYGEN SATURATION: 100 % | SYSTOLIC BLOOD PRESSURE: 144 MMHG | DIASTOLIC BLOOD PRESSURE: 78 MMHG | TEMPERATURE: 98.2 F | RESPIRATION RATE: 18 BRPM

## 2022-12-24 DIAGNOSIS — J06.9 UPPER RESPIRATORY INFECTION WITH COUGH AND CONGESTION: ICD-10-CM

## 2022-12-24 DIAGNOSIS — H65.01 RIGHT ACUTE SEROUS OTITIS MEDIA, RECURRENCE NOT SPECIFIED: Primary | ICD-10-CM

## 2022-12-24 RX ORDER — AMOXICILLIN 500 MG/1
500 CAPSULE ORAL EVERY 8 HOURS SCHEDULED
Qty: 30 CAPSULE | Refills: 0 | Status: SHIPPED | OUTPATIENT
Start: 2022-12-24 | End: 2023-01-03

## 2022-12-24 NOTE — PROGRESS NOTES
[facility]        NAME: Sam Sutton is a 44 y o  female  : 1985    MRN: [id number]  DATE: 2022  TIME: 12:33 PM    Assessment and Plan   Right acute serous otitis media, recurrence not specified [H65 01]  1  Right acute serous otitis media, recurrence not specified  amoxicillin (AMOXIL) 500 mg capsule    neomycin-polymyxin-hydrocortisone (CORTISPORIN) otic solution      2  Upper respiratory infection with cough and congestion              Patient Instructions       Follow up with PCP in 3-5 days  Proceed to  ER if symptoms worsen  You have a right ear infection  You have been prescribed amoxicillin and an ear antibiotic  You are to take dayquil to help dry up secretions and control cough  Take tylenol or motrin for pain as needed   Follow up with your PCP in 3-5 days  Go to the ED if symptoms worsen          Chief Complaint     Chief Complaint   Patient presents with   • Earache     Patient c/o that her ears have been popping for the last 2 days and today she is having right earache- some nasal congestion and cough         History of Present Illness       This is a 44year old female who states has had cold symptoms x 1 week, she has been taking theraflu  She now c/o right ear pain  She has had fever of 100  She is flu and covid vaccinated  Denies n/v/d, Pregnancy       Review of Systems   Review of Systems   Constitutional: Positive for fever  HENT: Positive for congestion and ear pain  Eyes: Negative  Respiratory: Positive for cough  Cardiovascular: Negative  Gastrointestinal: Negative  Endocrine: Negative  Genitourinary: Negative  Musculoskeletal: Negative  Skin: Negative  Allergic/Immunologic: Negative  Neurological: Negative  Hematological: Negative  Psychiatric/Behavioral: Negative            Current Medications       Current Outpatient Medications:   •  amoxicillin (AMOXIL) 500 mg capsule, Take 1 capsule (500 mg total) by mouth every 8 (eight) hours for 10 days, Disp: 30 capsule, Rfl: 0  •  neomycin-polymyxin-hydrocortisone (CORTISPORIN) otic solution, Administer 4 drops to the right ear every 6 (six) hours for 7 days, Disp: 10 mL, Rfl: 0  •  Cyanocobalamin (Vitamin B12 TR) 2000 MCG TBCR, Take 1 Dose by mouth once a week, Disp: , Rfl:   •  multivitamin (THERAGRAN) TABS, Take 1 tablet by mouth daily PRENATAL VITAMIN, Disp: , Rfl:   •  norgestimate-ethinyl estradiol (Ortho Tri-Cyclen Lo) 0 18/0 215/0 25 MG-25 MCG per tablet, Take 1 tablet by mouth daily, Disp: 90 tablet, Rfl: 0  •  omega-3-acid ethyl esters (LOVAZA) 1 g capsule, Take 100 mg by mouth daily OMEGA +  DHA= 500MG  - KELT, Disp: , Rfl:   •  VITAMIN D, CHOLECALCIFEROL, PO, Take 2,000 Units by mouth daily, Disp: , Rfl:     Current Allergies     Allergies as of 12/24/2022   • (No Known Allergies)            The following portions of the patient's history were reviewed and updated as appropriate: allergies, current medications, past family history, past medical history, past social history, past surgical history and problem list      Past Medical History:   Diagnosis Date   • Abnormal Pap smear of cervix    • Back pain    • GERD (gastroesophageal reflux disease)     none presantly   • Gestational diabetes w/ 1st pregnancy   • Helicobacter pylori (H  pylori) infection cleared after treatment   • Varicella     as child       Past Surgical History:   Procedure Laterality Date   • CERVICAL BIOPSY  W/ LOOP ELECTRODE EXCISION      2012   • CA ESOPHAGOGASTRODUODENOSCOPY TRANSORAL DIAGNOSTIC N/A 6/30/2016    Procedure: ESOPHAGOGASTRODUODENOSCOPY (EGD); Surgeon: Hartwell Sandifer, MD;  Location[facility] GI [facility];   Service: Gastroenterology   • WISDOM TOOTH EXTRACTION Bilateral 2000       Family History   Problem Relation Age of Onset   • Hyperlipidemia Mother    • Depression Father    • Hyperlipidemia Father    • No Known Problems Daughter    • No Known Problems Maternal Grandmother    • Cancer Maternal Grandfather         bile duct cancer/stomach ca   • Heart disease Paternal Grandfather         MI   • Depression Brother    • Mental illness Family         anxiety   • No Known Problems Maternal Aunt    • No Known Problems Paternal Aunt          Medications have been verified  Objective   /78 (BP Location: Right arm, Patient Position: Sitting, Cuff Size: Standard)   Pulse 100   Temp 98 2 °F (36 8 °C)   Resp 18   LMP 12/03/2022 (Exact Date)   SpO2 100%   Patient's last menstrual period was 12/03/2022 (exact date)  Physical Exam     Physical Exam  Vitals and nursing note reviewed  Constitutional:       General: She is not in acute distress  Appearance: Normal appearance  She is normal weight  She is not ill-appearing, toxic-appearing or diaphoretic  HENT:      Head: Normocephalic and atraumatic  Left Ear: Tympanic membrane and ear canal normal       Ears:      Comments: Right TM + fluid, erythematous   Pain with otoscope insertion      Nose: Congestion and rhinorrhea present  Mouth/Throat:      Mouth: Mucous membranes are moist       Pharynx: Oropharynx is clear  No oropharyngeal exudate or posterior oropharyngeal erythema  Eyes:      Extraocular Movements: Extraocular movements intact  Cardiovascular:      Rate and Rhythm: Normal rate and regular rhythm  Pulses: Normal pulses  Heart sounds: Normal heart sounds  Pulmonary:      Effort: Pulmonary effort is normal       Breath sounds: Normal breath sounds  Musculoskeletal:         General: Normal range of motion  Cervical back: Normal range of motion and neck supple  Lymphadenopathy:      Cervical: No cervical adenopathy  Skin:     General: Skin is warm and dry  Capillary Refill: Capillary refill takes less than 2 seconds  Neurological:      General: No focal deficit present  Mental Status: She is alert and oriented to person, place, and time     Psychiatric:         Mood and Affect: Mood normal          Thought Content:  Thought content normal          Judgment: Judgment normal

## 2022-12-24 NOTE — PATIENT INSTRUCTIONS
You have a right ear infection  You have been prescribed amoxicillin and an ear antibiotic    You are to take dayquil to help dry up secretions and control cough  Take tylenol or motrin for pain as needed   Follow up with your PCP in 3-5 days  Go to the ED if symptoms worsen

## 2022-12-27 LAB
LAB AP GYN PRIMARY INTERPRETATION: NORMAL
Lab: NORMAL

## 2023-01-26 ENCOUNTER — OFFICE VISIT (OUTPATIENT)
Dept: FAMILY MEDICINE CLINIC | Facility: CLINIC | Age: 38
End: 2023-01-26

## 2023-01-26 VITALS
TEMPERATURE: 97.9 F | RESPIRATION RATE: 16 BRPM | HEIGHT: 64 IN | HEART RATE: 94 BPM | DIASTOLIC BLOOD PRESSURE: 70 MMHG | WEIGHT: 163.4 LBS | SYSTOLIC BLOOD PRESSURE: 100 MMHG | BODY MASS INDEX: 27.9 KG/M2 | OXYGEN SATURATION: 99 %

## 2023-01-26 DIAGNOSIS — M54.2 ANTERIOR NECK PAIN: Primary | ICD-10-CM

## 2023-01-26 NOTE — PROGRESS NOTES
Name: Nhi Sarmiento      : 1985      MRN: [id number]  Encounter Provider: SHARRON Burden  Encounter Date: 2023   Encounter department: [place] [address]  Anterior neck pain  Assessment & Plan:  Sx for about 3 weeks on R side of anterior neck  Several palpable lymph nodes that are mildly enlarged but feel normal texture  Some tenderness to palpation of R submandibular node  ROM normal and not increasing sx  R ear recently infected now appears healthy, bilateral tonsillar hypertrophy without inflammation, bilateral sinus swelling  Reassured pt that exam is benign at this time, recommend surveillance and can start flonase daily  If sx worsen or persist for several more weeks would recommend US at that point  Subjective      Pt is a 44 y o  y/o female who is seen today for evaluation of neck pain  PMH of HLD, chronic R thoracic back pain, GERD, overweight  She has been having R sided anterior neck pain for about 3 weeks  She has a hx of R upper back pain that sometimes radiates to her neck , but this does not feel the same  Pain is intermittent, described as aching but gets occasional shooting pains down the R side of her neck  At times swallowing makes her feel symptomatic  Pain occasionally does radiate up below the R ear, no pain with chewing, no headaches, no tooth or jaw pain  Denies fever/chills, night sweats, cp, sob  Movement does not exacerbate her symptoms  She does not feel any lumps on her neck  She did not experience any injury or do anything strenuous prior to onset  She has tried heat which has not helped, ibuprofen helps a bit though  Review of Systems   Constitutional: Negative for activity change, chills, fatigue and fever  HENT: Negative for congestion, facial swelling, sinus pressure, sore throat, tinnitus and trouble swallowing  Respiratory: Negative for shortness of breath      Cardiovascular: Negative for chest pain and palpitations  Musculoskeletal: Positive for neck pain (R anterior neck pain)  Negative for arthralgias, back pain, joint swelling and myalgias  Skin: Negative for color change, rash and wound  Neurological: Negative for dizziness, weakness, light-headedness and headaches  Hematological: Negative for adenopathy  Current Outpatient Medications on File Prior to Visit   Medication Sig   • Cyanocobalamin (Vitamin B12 TR) 2000 MCG TBCR Take 1 Dose by mouth once a week   • multivitamin (THERAGRAN) TABS Take 1 tablet by mouth daily PRENATAL VITAMIN   • omega-3-acid ethyl esters (LOVAZA) 1 g capsule Take 100 mg by mouth daily OMEGA +  DHA= 500MG  - KELT   • VITAMIN D, CHOLECALCIFEROL, PO Take 2,000 Units by mouth daily   • neomycin-polymyxin-hydrocortisone (CORTISPORIN) otic solution Administer 4 drops to the right ear every 6 (six) hours for 7 days   • norgestimate-ethinyl estradiol (Ortho Tri-Cyclen Lo) 0 18/0 215/0 25 MG-25 MCG per tablet Take 1 tablet by mouth daily (Patient not taking: Reported on 1/26/2023)       Objective     /70 (BP Location: Left arm, Patient Position: Sitting, Cuff Size: Adult)   Pulse 94   Temp 97 9 °F (36 6 °C) (Tympanic)   Resp 16   Ht 5' 4" (1 626 m)   Wt 74 1 kg (163 lb 6 4 oz)   SpO2 99%   BMI 28 05 kg/m²     Physical Exam  Vitals reviewed  Constitutional:       General: She is awake  She is not in acute distress  Appearance: Normal appearance  She is well-developed and well-groomed  She is not ill-appearing  HENT:      Head: Normocephalic  Right Ear: Hearing, tympanic membrane, ear canal and external ear normal       Left Ear: Hearing, tympanic membrane, ear canal and external ear normal       Nose: Mucosal edema present  Mouth/Throat:      Lips: Pink  Mouth: Mucous membranes are moist       Pharynx: Oropharynx is clear  Tonsils: 2+ on the right  2+ on the left     Eyes:      General: Lids are normal    Neck: Thyroid: No thyroid mass or thyromegaly  Vascular: Normal carotid pulses  No carotid bruit  Cardiovascular:      Rate and Rhythm: Normal rate and regular rhythm  Heart sounds: Normal heart sounds  Pulmonary:      Effort: Pulmonary effort is normal       Breath sounds: Normal breath sounds  Musculoskeletal:         General: Normal range of motion  Cervical back: Full passive range of motion without pain and normal range of motion  No edema, rigidity or tenderness  No pain with movement or muscular tenderness  Normal range of motion  Thoracic back: Tenderness (R upper back trapezius tenderness) present  Lymphadenopathy:      Head:      Right side of head: Submandibular and tonsillar adenopathy present  Left side of head: Submandibular and tonsillar adenopathy present  Cervical: Cervical adenopathy present  Right cervical: Superficial cervical adenopathy present  Left cervical: Superficial cervical adenopathy present  Skin:     General: Skin is warm and dry  Findings: No erythema  Neurological:      Mental Status: She is alert and oriented to person, place, and time  Psychiatric:         Attention and Perception: Attention normal          Mood and Affect: Mood normal          Speech: Speech normal          Behavior: Behavior normal  Behavior is cooperative  Thought Content:  Thought content normal          Judgment: Judgment normal        SHARRON Hein

## 2023-01-27 PROBLEM — M54.2 ANTERIOR NECK PAIN: Status: ACTIVE | Noted: 2023-01-27

## 2023-01-27 NOTE — ASSESSMENT & PLAN NOTE
Sx for about 3 weeks on R side of anterior neck  Several palpable lymph nodes that are mildly enlarged but feel normal texture  Some tenderness to palpation of R submandibular node  ROM normal and not increasing sx  R ear recently infected now appears healthy, bilateral tonsillar hypertrophy without inflammation, bilateral sinus swelling  Reassured pt that exam is benign at this time, recommend surveillance and can start flonase daily  If sx worsen or persist for several more weeks would recommend US at that point

## 2023-02-03 ENCOUNTER — TELEPHONE (OUTPATIENT)
Dept: FAMILY MEDICINE CLINIC | Facility: CLINIC | Age: 38
End: 2023-02-03

## 2023-02-03 DIAGNOSIS — M54.2 ANTERIOR NECK PAIN: Primary | ICD-10-CM

## 2023-02-03 NOTE — TELEPHONE ENCOUNTER
Patient called and stated that she saw you a couple weeks ago and was told to call back if not better and that you would order an US of her neck and she wanted to know if you can order that for her

## 2023-02-13 ENCOUNTER — HOSPITAL ENCOUNTER (OUTPATIENT)
Dept: RADIOLOGY | Age: 38
Discharge: HOME | End: 2023-02-13

## 2023-02-13 DIAGNOSIS — M54.2 ANTERIOR NECK PAIN: ICD-10-CM

## 2023-03-22 DIAGNOSIS — Z13.29 SCREENING FOR THYROID DISORDER: Primary | ICD-10-CM

## 2023-04-21 ENCOUNTER — APPOINTMENT (OUTPATIENT)
Dept: LAB | Facility: CLINIC | Age: 38
End: 2023-04-21

## 2023-04-21 DIAGNOSIS — Z13.29 SCREENING FOR THYROID DISORDER: ICD-10-CM

## 2023-04-21 LAB — TSH SERPL DL<=0.05 MIU/L-ACNC: 1.19 UIU/ML (ref 0.45–4.5)

## 2023-08-23 ENCOUNTER — APPOINTMENT (OUTPATIENT)
Dept: LAB | Facility: CLINIC | Age: 38
End: 2023-08-23

## 2023-08-23 DIAGNOSIS — Z00.8 HEALTH EXAMINATION IN POPULATION SURVEY: ICD-10-CM

## 2023-08-23 LAB
CHOLEST SERPL-MCNC: 218 MG/DL
EST. AVERAGE GLUCOSE BLD GHB EST-MCNC: 114 MG/DL
HBA1C MFR BLD: 5.6 %
HDLC SERPL-MCNC: 50 MG/DL
LDLC SERPL CALC-MCNC: 140 MG/DL (ref 0–100)
NONHDLC SERPL-MCNC: 168 MG/DL
TRIGL SERPL-MCNC: 141 MG/DL

## 2023-08-23 PROCEDURE — 36415 COLL VENOUS BLD VENIPUNCTURE: CPT

## 2023-08-23 PROCEDURE — 83036 HEMOGLOBIN GLYCOSYLATED A1C: CPT

## 2023-08-23 PROCEDURE — 80061 LIPID PANEL: CPT

## 2023-08-31 DIAGNOSIS — M54.2 ANTERIOR NECK PAIN: Primary | ICD-10-CM

## 2023-09-06 ENCOUNTER — APPOINTMENT (OUTPATIENT)
Dept: LAB | Facility: CLINIC | Age: 38
End: 2023-09-06
Payer: COMMERCIAL

## 2023-09-06 DIAGNOSIS — M54.2 ANTERIOR NECK PAIN: ICD-10-CM

## 2023-09-06 LAB
ANION GAP SERPL CALCULATED.3IONS-SCNC: 7 MMOL/L
BUN SERPL-MCNC: 9 MG/DL (ref 5–25)
CALCIUM SERPL-MCNC: 9.4 MG/DL (ref 8.4–10.2)
CHLORIDE SERPL-SCNC: 103 MMOL/L (ref 96–108)
CO2 SERPL-SCNC: 28 MMOL/L (ref 21–32)
CREAT SERPL-MCNC: 0.6 MG/DL (ref 0.6–1.3)
GFR SERPL CREATININE-BSD FRML MDRD: 116 ML/MIN/1.73SQ M
GLUCOSE SERPL-MCNC: 91 MG/DL (ref 65–140)
POTASSIUM SERPL-SCNC: 3.9 MMOL/L (ref 3.5–5.3)
SODIUM SERPL-SCNC: 138 MMOL/L (ref 135–147)

## 2023-09-06 PROCEDURE — 36415 COLL VENOUS BLD VENIPUNCTURE: CPT

## 2023-09-06 PROCEDURE — 80048 BASIC METABOLIC PNL TOTAL CA: CPT

## 2023-09-08 ENCOUNTER — HOSPITAL ENCOUNTER (OUTPATIENT)
Dept: RADIOLOGY | Facility: HOSPITAL | Age: 38
Discharge: HOME | End: 2023-09-08
Payer: COMMERCIAL

## 2023-09-08 DIAGNOSIS — R93.89 ABNORMAL FINDING ON CT SCAN: ICD-10-CM

## 2023-09-08 DIAGNOSIS — M54.2 ANTERIOR NECK PAIN: Primary | ICD-10-CM

## 2023-09-08 DIAGNOSIS — M54.2 ANTERIOR NECK PAIN: ICD-10-CM

## 2023-09-08 PROCEDURE — 70491 CT SOFT TISSUE NECK W/DYE: CPT

## 2023-09-08 PROCEDURE — G1004 CDSM NDSC: HCPCS

## 2023-09-08 RX ADMIN — IOHEXOL 85 ML: 350 INJECTION, SOLUTION INTRAVENOUS at 07:15

## 2023-09-25 ENCOUNTER — HOSPITAL ENCOUNTER (EMERGENCY)
Facility: HOSPITAL | Age: 38
Discharge: HOME | End: 2023-09-25
Attending: STUDENT IN AN ORGANIZED HEALTH CARE EDUCATION/TRAINING PROGRAM
Payer: COMMERCIAL

## 2023-09-25 ENCOUNTER — APPOINTMENT (EMERGENCY)
Dept: ULTRASOUND IMAGING | Facility: HOSPITAL | Age: 38
End: 2023-09-25
Payer: COMMERCIAL

## 2023-09-25 VITALS
SYSTOLIC BLOOD PRESSURE: 126 MMHG | RESPIRATION RATE: 18 BRPM | DIASTOLIC BLOOD PRESSURE: 75 MMHG | OXYGEN SATURATION: 100 % | WEIGHT: 162.7 LBS | HEART RATE: 93 BPM | BODY MASS INDEX: 27.93 KG/M2 | TEMPERATURE: 98.5 F

## 2023-09-25 DIAGNOSIS — R10.13 EPIGASTRIC PAIN: Primary | ICD-10-CM

## 2023-09-25 LAB
ALBUMIN SERPL BCP-MCNC: 5 G/DL (ref 3.5–5)
ALP SERPL-CCNC: 63 U/L (ref 34–104)
ALT SERPL W P-5'-P-CCNC: 12 U/L (ref 7–52)
ANION GAP SERPL CALCULATED.3IONS-SCNC: 10 MMOL/L
AST SERPL W P-5'-P-CCNC: 16 U/L (ref 13–39)
BASOPHILS # BLD AUTO: 0.03 THOUSANDS/ÂΜL (ref 0–0.1)
BASOPHILS NFR BLD AUTO: 0 % (ref 0–1)
BILIRUB SERPL-MCNC: 0.66 MG/DL (ref 0.2–1)
BUN SERPL-MCNC: 8 MG/DL (ref 5–25)
CALCIUM SERPL-MCNC: 9.8 MG/DL (ref 8.4–10.2)
CHLORIDE SERPL-SCNC: 102 MMOL/L (ref 96–108)
CO2 SERPL-SCNC: 24 MMOL/L (ref 21–32)
CREAT SERPL-MCNC: 0.66 MG/DL (ref 0.6–1.3)
EOSINOPHIL # BLD AUTO: 0.03 THOUSAND/ÂΜL (ref 0–0.61)
EOSINOPHIL NFR BLD AUTO: 0 % (ref 0–6)
ERYTHROCYTE [DISTWIDTH] IN BLOOD BY AUTOMATED COUNT: 12.4 % (ref 11.6–15.1)
EXT PREGNANCY TEST URINE: NEGATIVE
EXT. CONTROL: NORMAL
GFR SERPL CREATININE-BSD FRML MDRD: 112 ML/MIN/1.73SQ M
GLUCOSE SERPL-MCNC: 108 MG/DL (ref 65–140)
HCT VFR BLD AUTO: 42.9 % (ref 34.8–46.1)
HGB BLD-MCNC: 13.9 G/DL (ref 11.5–15.4)
IMM GRANULOCYTES # BLD AUTO: 0.05 THOUSAND/UL (ref 0–0.2)
IMM GRANULOCYTES NFR BLD AUTO: 1 % (ref 0–2)
LIPASE SERPL-CCNC: 14 U/L (ref 11–82)
LYMPHOCYTES # BLD AUTO: 2.25 THOUSANDS/ÂΜL (ref 0.6–4.47)
LYMPHOCYTES NFR BLD AUTO: 24 % (ref 14–44)
MCH RBC QN AUTO: 30.8 PG (ref 26.8–34.3)
MCHC RBC AUTO-ENTMCNC: 32.4 G/DL (ref 31.4–37.4)
MCV RBC AUTO: 95 FL (ref 82–98)
MONOCYTES # BLD AUTO: 0.55 THOUSAND/ÂΜL (ref 0.17–1.22)
MONOCYTES NFR BLD AUTO: 6 % (ref 4–12)
NEUTROPHILS # BLD AUTO: 6.42 THOUSANDS/ÂΜL (ref 1.85–7.62)
NEUTS SEG NFR BLD AUTO: 69 % (ref 43–75)
NRBC BLD AUTO-RTO: 0 /100 WBCS
PLATELET # BLD AUTO: 312 THOUSANDS/UL (ref 149–390)
PMV BLD AUTO: 9.5 FL (ref 8.9–12.7)
POTASSIUM SERPL-SCNC: 4.3 MMOL/L (ref 3.5–5.3)
PROT SERPL-MCNC: 8.2 G/DL (ref 6.4–8.4)
RBC # BLD AUTO: 4.52 MILLION/UL (ref 3.81–5.12)
SODIUM SERPL-SCNC: 136 MMOL/L (ref 135–147)
WBC # BLD AUTO: 9.33 THOUSAND/UL (ref 4.31–10.16)

## 2023-09-25 PROCEDURE — 36415 COLL VENOUS BLD VENIPUNCTURE: CPT

## 2023-09-25 PROCEDURE — 99284 EMERGENCY DEPT VISIT MOD MDM: CPT | Performed by: STUDENT IN AN ORGANIZED HEALTH CARE EDUCATION/TRAINING PROGRAM

## 2023-09-25 PROCEDURE — 85025 COMPLETE CBC W/AUTO DIFF WBC: CPT | Performed by: STUDENT IN AN ORGANIZED HEALTH CARE EDUCATION/TRAINING PROGRAM

## 2023-09-25 PROCEDURE — 81025 URINE PREGNANCY TEST: CPT

## 2023-09-25 PROCEDURE — 76705 ECHO EXAM OF ABDOMEN: CPT

## 2023-09-25 PROCEDURE — 83690 ASSAY OF LIPASE: CPT | Performed by: STUDENT IN AN ORGANIZED HEALTH CARE EDUCATION/TRAINING PROGRAM

## 2023-09-25 PROCEDURE — 96361 HYDRATE IV INFUSION ADD-ON: CPT

## 2023-09-25 PROCEDURE — 99284 EMERGENCY DEPT VISIT MOD MDM: CPT

## 2023-09-25 PROCEDURE — 96374 THER/PROPH/DIAG INJ IV PUSH: CPT

## 2023-09-25 PROCEDURE — 80053 COMPREHEN METABOLIC PANEL: CPT | Performed by: STUDENT IN AN ORGANIZED HEALTH CARE EDUCATION/TRAINING PROGRAM

## 2023-09-25 RX ORDER — MAGNESIUM HYDROXIDE/ALUMINUM HYDROXICE/SIMETHICONE 120; 1200; 1200 MG/30ML; MG/30ML; MG/30ML
30 SUSPENSION ORAL ONCE
Status: COMPLETED | OUTPATIENT
Start: 2023-09-25 | End: 2023-09-25

## 2023-09-25 RX ORDER — FAMOTIDINE 20 MG/1
20 TABLET, FILM COATED ORAL 2 TIMES DAILY
Qty: 30 TABLET | Refills: 0 | Status: SHIPPED | OUTPATIENT
Start: 2023-09-25 | End: 2023-10-04

## 2023-09-25 RX ORDER — FAMOTIDINE 10 MG/ML
20 INJECTION, SOLUTION INTRAVENOUS ONCE
Status: COMPLETED | OUTPATIENT
Start: 2023-09-25 | End: 2023-09-25

## 2023-09-25 RX ADMIN — ALUMINUM HYDROXIDE, MAGNESIUM HYDROXIDE, AND DIMETHICONE 30 ML: 200; 20; 200 SUSPENSION ORAL at 16:06

## 2023-09-25 RX ADMIN — SODIUM CHLORIDE 1000 ML: 0.9 INJECTION, SOLUTION INTRAVENOUS at 16:01

## 2023-09-25 RX ADMIN — FAMOTIDINE 20 MG: 10 INJECTION INTRAVENOUS at 16:02

## 2023-09-25 NOTE — ED ATTENDING ATTESTATION
9/25/2023  Olayinka Rodriguez DO, saw and evaluated the patient. I have discussed the patient with the resident/non-physician practitioner and agree with the resident's/non-physician practitioner's findings, Plan of Care, and MDM as documented in the resident's/non-physician practitioner's note, except where noted. All available labs and Radiology studies were reviewed. I was present for key portions of any procedure(s) performed by the resident/non-physician practitioner and I was immediately available to provide assistance. At this point I agree with the current assessment done in the Emergency Department. I have conducted an independent evaluation of this patient a history and physical is as follows:    43-year-old female presenting for evaluation of epigastric abdominal pain. Has history of H. pylori gastritis. States that this feels different than prior abdominal pains due to gastritis. Pain worsens after eating. No fevers though has felt generally unwell. Pain was initially radiating to the right shoulder. On my exam, is resting comfortably no acute distress, does have some mild abdominal tenderness in the epigastric region without peritoneal signs. Speaking in full sentences. Moving all extremities with no gross motor deficit. Labs show no acute electrode abnormalities, normal renal function, no acute LFT abnormalities, no leukocytosis, stable hemoglobin, hCG is negative. Right upper quadrant ultrasound was obtained and found to be negative for acute biliary pathology. Patient was given medications with some improvement in symptoms. We will send prescription for Pepcid to her pharmacy. Plan will be for discharge with close outpatient follow-up. Stable for discharge and agreeable to the plan.   Strict return ED precautions given    ED Course         Critical Care Time  Procedures

## 2023-09-25 NOTE — ED PROVIDER NOTES
History  Chief Complaint   Patient presents with   • Abdominal Pain     Pt c/o upper abd pain that radiates to upper back and R shoulder. Pain started Saturday and has become more constant. Pt describes pain as a "stabbing pain" Denies Cardiac hx, Hx Gastritis      Stephen Green is a 44year old female with a history of H. pylori positive gastritis, GERD, presenting for evaluation of epigastric abdominal pain radiating into her back. She describes that the pain feels "deep" in her abdomen, different from prior gastritis episodes. Pain is worse when lying on her back. No nausea or vomiting. No changes in bowel movements. No dysuria or hematuria. No fevers. She is sexually active, does not use any form of contraception, does not believe that she is pregnant. No prior abdominal surgeries, did have prior EGD completed several years ago that did not show any evidence of peptic ulcer disease despite being H. pylori positive. She is not currently on any GERD/gastritis medications. Patient does endorse drinking alcohol. She recently quit smoking. History provided by:  Patient   used: No    Abdominal Pain  Associated symptoms: chills    Associated symptoms: no chest pain, no constipation, no cough, no diarrhea, no dysuria, no fever, no hematuria, no nausea, no shortness of breath, no sore throat and no vomiting        Prior to Admission Medications   Prescriptions Last Dose Informant Patient Reported? Taking?    Cyanocobalamin (Vitamin B12 TR) 2000 MCG TBCR  Self Yes No   Sig: Take 1 Dose by mouth once a week   VITAMIN D, CHOLECALCIFEROL, PO  Self Yes No   Sig: Take 2,000 Units by mouth daily   multivitamin (THERAGRAN) TABS   Yes No   Sig: Take 1 tablet by mouth daily PRENATAL VITAMIN   neomycin-polymyxin-hydrocortisone (CORTISPORIN) otic solution   No No   Sig: Administer 4 drops to the right ear every 6 (six) hours for 7 days   norgestimate-ethinyl estradiol (Ortho Tri-Cyclen Lo) 0. 18/0.215/0.25 MG-25 MCG per tablet   No No   Sig: Take 1 tablet by mouth daily   Patient not taking: Reported on 2023   omega-3-acid ethyl esters (LOVAZA) 1 g capsule   Yes No   Sig: Take 100 mg by mouth daily OMEGA +  DHA= 500MG  - [facility]      Facility-Administered Medications: None       Past Medical History:   Diagnosis Date   • Abnormal Pap smear of cervix    • Back pain    • GERD (gastroesophageal reflux disease)     none presantly   • Gestational diabetes w/ 1st pregnancy   • Helicobacter pylori (H. pylori) infection cleared after treatment   • Varicella     as child       Past Surgical History:   Procedure Laterality Date   • CERVICAL BIOPSY  W/ LOOP ELECTRODE EXCISION         • ND ESOPHAGOGASTRODUODENOSCOPY TRANSORAL DIAGNOSTIC N/A 2016    Procedure: ESOPHAGOGASTRODUODENOSCOPY (EGD); Surgeon: Amy Martin MD;  Location: [facility] GI LAB; Service: Gastroenterology   • WISDOM TOOTH EXTRACTION Bilateral        Family History   Problem Relation Age of Onset   • Hyperlipidemia Mother    • Depression Father    • Hyperlipidemia Father    • No Known Problems Daughter    • No Known Problems Maternal Grandmother    • Cancer Maternal Grandfather         bile duct cancer/stomach ca   • Heart disease Paternal Grandfather         MI   • Depression Brother    • Mental illness Family         anxiety   • No Known Problems Maternal Aunt    • No Known Problems Paternal Aunt      I have reviewed and agree with the history as documented.     E-Cigarette/Vaping   • E-Cigarette Use Never User      E-Cigarette/Vaping Substances   • Nicotine No    • THC No    • CBD No    • Flavoring No    • Other No    • Unknown No      Social History     Tobacco Use   • Smoking status: Former     Packs/day: 0.25     Years: 8.00     Total pack years: 2.00     Types: Cigarettes     Quit date: 2017     Years since quittin.7   • Smokeless tobacco: Never   Vaping Use   • Vaping Use: Never used   Substance Use Topics   • Alcohol use: Yes     Alcohol/week: 1.0 standard drink of alcohol     Types: 1 Cans of beer per week     Comment: social   • Drug use: No        Review of Systems   Constitutional: Positive for chills. Negative for fever. HENT: Negative for ear pain and sore throat. Eyes: Negative for pain and visual disturbance. Respiratory: Negative for cough and shortness of breath. Cardiovascular: Negative for chest pain and palpitations. Gastrointestinal: Positive for abdominal pain. Negative for blood in stool, constipation, diarrhea, nausea and vomiting. Genitourinary: Negative for dysuria and hematuria. Musculoskeletal: Negative for arthralgias and back pain. Skin: Negative for color change and rash. Neurological: Negative for seizures and syncope. All other systems reviewed and are negative. Physical Exam  ED Triage Vitals [09/25/23 1355]   Temperature Pulse Respirations Blood Pressure SpO2   98.5 °F (36.9 °C) 104 18 135/62 99 %      Temp Source Heart Rate Source Patient Position - Orthostatic VS BP Location FiO2 (%)   Oral Monitor Sitting Right arm --      Pain Score       9             Orthostatic Vital Signs  Vitals:    09/25/23 1355 09/25/23 1456 09/25/23 1530 09/25/23 1630   BP: 135/62 133/85 119/73 126/75   Pulse: 104 94 87 93   Patient Position - Orthostatic VS: Sitting Sitting Sitting Sitting       Physical Exam  Vitals and nursing note reviewed. Constitutional:       General: She is not in acute distress. Appearance: Normal appearance. She is not ill-appearing. HENT:      Head: Normocephalic and atraumatic. Mouth/Throat:      Mouth: Mucous membranes are moist.      Pharynx: Oropharynx is clear. Eyes:      General: No scleral icterus. Conjunctiva/sclera: Conjunctivae normal.   Cardiovascular:      Rate and Rhythm: Normal rate and regular rhythm. Heart sounds: Normal heart sounds. Pulmonary:      Effort: Pulmonary effort is normal. No respiratory distress.       Breath sounds: Normal breath sounds. Abdominal:      General: Abdomen is flat. There is no distension. Palpations: Abdomen is soft. Tenderness: There is abdominal tenderness in the epigastric area. There is no guarding or rebound. Hernia: No hernia is present. Musculoskeletal:         General: No tenderness or signs of injury. Cervical back: Neck supple. No rigidity. Skin:     General: Skin is warm. Coloration: Skin is not jaundiced. Findings: No erythema or rash. Neurological:      General: No focal deficit present. Mental Status: She is alert. Mental status is at baseline.    Psychiatric:         Mood and Affect: Mood normal.         Behavior: Behavior normal.         ED Medications  Medications   sodium chloride 0.9 % bolus 1,000 mL (1,000 mL Intravenous New Bag 9/25/23 1601)   Famotidine (PF) (PEPCID) injection 20 mg (20 mg Intravenous Given 9/25/23 1602)   aluminum-magnesium hydroxide-simethicone (MAALOX) oral suspension 30 mL (30 mL Oral Given 9/25/23 1606)       Diagnostic Studies  Results Reviewed     Procedure Component Value Units Date/Time    POCT pregnancy, urine [[id number]]  (Normal) Resulted: 09/25/23 1618    Lab Status: Final result Updated: 09/25/23 1618     EXT Preg Test, Ur Negative     Control Valid    Comprehensive metabolic panel [[id number]] Collected: 09/25/23 1405    Lab Status: Final result Specimen: Blood from Arm, Right Updated: 09/25/23 1446     Sodium 136 mmol/L      Potassium 4.3 mmol/L      Chloride 102 mmol/L      CO2 24 mmol/L      ANION GAP 10 mmol/L      BUN 8 mg/dL      Creatinine 0.66 mg/dL      Glucose 108 mg/dL      Calcium 9.8 mg/dL      AST 16 U/L      ALT 12 U/L      Alkaline Phosphatase 63 U/L      Total Protein 8.2 g/dL      Albumin 5.0 g/dL      Total Bilirubin 0.66 mg/dL      eGFR 112 ml/min/1.73sq m     Narrative:      [place] guidelines for Chronic Kidney Disease (CKD):   •  Stage 1 with normal or high GFR (GFR > 90 mL/min/1.73 square meters)  •  Stage 2 Mild CKD (GFR = 60-89 mL/min/1.73 square meters)  •  Stage 3A Moderate CKD (GFR = 45-59 mL/min/1.73 square meters)  •  Stage 3B Moderate CKD (GFR = 30-44 mL/min/1.73 square meters)  •  Stage 4 Severe CKD (GFR = 15-29 mL/min/1.73 square meters)  •  Stage 5 End Stage CKD (GFR <15 mL/min/1.73 square meters)  Note: GFR calculation is accurate only with a steady state creatinine    Lipase [[id number]]  (Normal) Collected: 09/25/23 1405    Lab Status: Final result Specimen: Blood from Arm, Right Updated: 09/25/23 1446     Lipase 14 u/L     CBC and differential [[id number]] Collected: 09/25/23 1405    Lab Status: Final result Specimen: Blood from Arm, Right Updated: 09/25/23 1430     WBC 9.33 Thousand/uL      RBC 4.52 Million/uL      Hemoglobin 13.9 g/dL      Hematocrit 42.9 %      MCV 95 fL      MCH 30.8 pg      MCHC 32.4 g/dL      RDW 12.4 %      MPV 9.5 fL      Platelets 433 Thousands/uL      nRBC 0 /100 WBCs      Neutrophils Relative 69 %      Immat GRANS % 1 %      Lymphocytes Relative 24 %      Monocytes Relative 6 %      Eosinophils Relative 0 %      Basophils Relative 0 %      Neutrophils Absolute 6.42 Thousands/µL      Immature Grans Absolute 0.05 Thousand/uL      Lymphocytes Absolute 2.25 Thousands/µL      Monocytes Absolute 0.55 Thousand/µL      Eosinophils Absolute 0.03 Thousand/µL      Basophils Absolute 0.03 Thousands/µL                  US right upper quadrant   Final Result by Maria G Quezada MD (09/25 1602)      Normal.      Workstation performed: [id number]               Procedures  Procedures      ED Course  ED Course as of 09/25/23 1642   Mon Sep 25, 2023   1612 US right upper quadrant  Normal right upper quadrant ultrasound. Will reassess shortly after she received Maalox and Pepcid. SBIRT 20yo+    Flowsheet Row Most Recent Value   Initial Alcohol Screen: US AUDIT-C     1. How often do you have a drink containing alcohol?  1 Filed at: 09/25/2023 1500   2. How many drinks containing alcohol do you have on a typical day you are drinking? 1 Filed at: 09/25/2023 1500   3b. FEMALE Any Age, or MALE 65+: How often do you have 4 or more drinks on one occassion? 0 Filed at: 09/25/2023 1500   Audit-C Score 2 Filed at: 09/25/2023 1500   JAY: How many times in the past year have you. .. Used an illegal drug or used a prescription medication for non-medical reasons? Never Filed at: 09/25/2023 5001 [facility]  Susan Head is a 44year old female with a history of H. pylori positive gastritis, GERD, presenting for evaluation of epigastric abdominal pain radiating into her back. She describes that the pain feels "deep" in her abdomen, different from prior gastritis episodes. No fevers. No nausea or vomiting. No change of bowel movements. On exam, patient was not in any significant distress, mild epigastric abdominal to pain to palpation. No rebound or guarding. Abdomen soft. Vital signs unremarkable. Suspect another episode of gastritis, however mildly suspicious for cholecystitis, choledocholithiasis, low suspicion for pancreatitis. Patient blood work was unremarkable, lipase normal, no evidence of pancreatitis, no leukocytosis. Right upper quadrant ultrasound was normal in appearance, no evidence of cholecystitis or choledocholithiasis. Patient was given Maalox and Pepcid after which her pain went from an 8 out of 10 to a 4 out of 10. Suspect her symptoms are secondary to gastritis. Advised that she follow-up with her GI specialist, prescription of Pepcid was sent to her pharmacy, I gave strict return precautions, she was understanding and agreeable to plan. Epigastric pain: acute illness or injury  Amount and/or Complexity of Data Reviewed  Labs: ordered. Radiology: ordered. Decision-making details documented in ED Course. Risk  OTC drugs.   Prescription drug management. Disposition  Final diagnoses:   Epigastric pain     Time reflects when diagnosis was documented in both MDM as applicable and the Disposition within this note     Time User Action Codes Description Comment    9/25/2023  4:37 PM Jeet Anderson Add [R10.13] Epigastric pain       ED Disposition     ED Disposition   Discharge    Condition   Stable    Date/Time   Mon Sep 25, 2023  4:37 PM    [address] discharge to home/self care. Follow-up Information     Follow up With Specialties Details Why Contact Info Additional [address] [facility]) Gastroenterology Schedule an appointment as soon as possible for a visit   [address] [phone] [facility] [address], [address] [phone]          Patient's Medications   Discharge Prescriptions    FAMOTIDINE (PEPCID) 20 MG TABLET    Take 1 tablet (20 mg total) by mouth 2 (two) times a day       Start Date: 9/25/2023 End Date: --       Order Dose: 20 mg       Quantity: 30 tablet    Refills: 0     No discharge procedures on file. PDMP Review     None           ED Provider  Attending physically available and evaluated [facility]. I managed the patient along with the ED Attending.     Electronically Signed by         Cem Noriega DO  09/25/23 8528

## 2023-09-25 NOTE — DISCHARGE INSTRUCTIONS
Take the pepcid as prescribed, call and schedule a follow-up appointment with your GI specialist. Return to the ED for any new or worsening symptoms.

## 2023-10-04 ENCOUNTER — OFFICE VISIT (OUTPATIENT)
Dept: GASTROENTEROLOGY | Facility: CLINIC | Age: 38
End: 2023-10-04
Payer: COMMERCIAL

## 2023-10-04 VITALS
BODY MASS INDEX: 28 KG/M2 | WEIGHT: 164 LBS | TEMPERATURE: 98.8 F | HEIGHT: 64 IN | DIASTOLIC BLOOD PRESSURE: 60 MMHG | SYSTOLIC BLOOD PRESSURE: 106 MMHG

## 2023-10-04 DIAGNOSIS — R10.13 EPIGASTRIC ABDOMINAL PAIN: Primary | ICD-10-CM

## 2023-10-04 DIAGNOSIS — K21.9 GERD WITHOUT ESOPHAGITIS: ICD-10-CM

## 2023-10-04 PROCEDURE — 99214 OFFICE O/P EST MOD 30 MIN: CPT | Performed by: PHYSICIAN ASSISTANT

## 2023-10-04 RX ORDER — FAMOTIDINE 20 MG/1
20 TABLET, FILM COATED ORAL 2 TIMES DAILY
Qty: 60 TABLET | Refills: 3 | Status: SHIPPED | OUTPATIENT
Start: 2023-10-04

## 2023-10-04 NOTE — PROGRESS NOTES
[facility] - Outpatient Follow-up Note  Sergio Cancer 44 y.o. female MRN: [id number]  Encounter: [id number]    ASSESSMENT AND PLAN:    1. Epigastric abdominal pain  2. GERD without esophagitis  Patient with ongoing upper abdominal pain, radiates to back, worse at night and with fatty foods. EGD in 2021 normal with biopsy negative for H pylori. RUQ US in ER normal. Labs normal.   Discussed pain may be due to gallbladder dyskinesia, gastritis, GERD   I educated patient on GERD diet and lifestyle including avoidance of trigger foods, smaller frequent meals, not eating close to bedtime. She will limit her alcohol use. I also advised to limit NSAIDs if able. She will continue H2B BID for now- this is helping. No alarm symptoms to warrant EGD at this time. She prefers to hold off on EGD for now  Will also check HIDA scan to r/o gallbladder dyskinesia     - NM hepatobiliary w rx; Future  - famotidine (PEPCID) 20 mg tablet; Take 1 tablet (20 mg total) by mouth 2 (two) times a day  Dispense: 60 tablet; Refill: 3    Patient was instructed to call the office with any questions, concerns, new/ worsening/ persisting GI symptoms. Advised patient go to the ER with any severe or worsening abdominal pain, fevers/ chills, intractable N/V, chest pain, SOB, dizziness, lightheadedness, feeling something stuck in esophagus that will not go down. Patient expressed understanding and is in agreement with treatment plan. Will plan to follow up after diagnostic tests. If pain persists in follow up to consider EGD, CT imaging   __________________________________________________________    SUBJECTIVE:      Patient is new to me. Patient with a past medical history of GERD, remote history of H. pylori gastritis presents to the office today with a chief complaint of ongoing abdominal pain. Patient was seen in the ER 9/25/2023, ER note reviewed. She presented with worsening upper abdominal pain.   Right upper quadrant ultrasound in the ER was normal.  CBC, CMP, lipase any are also normal.  She was given a GI cocktail and symptoms improved, she was sent home on famotidine. Patient presents with CC of abdominal pain x 2 months. Worsening over the last several weeks. Feels like when she had bad gastritis years ago. Describes as burning, dull. Can radiate to mid back. Comes and goes. Worse at night when laying down. When pain is there it can last an hour. Pain worse with wine, spicy sausage. Associated bleching. She is taking famotidine 20 mg BID since ER which she thinks is helping   Patient denies any fevers/ chills, unintentional weight loss, decreased appetite, nausea, vomiting, change in bowel habits, diarrhea, constipation, black or bloody stools, heartburn, acid reflux, dysphagia, odynophagia. Denies chest pain, SOB  She notes she is under a lot of stress, in school, eating lots of pizza and drinking 1-2 glasses of wine/ night   She denies NSAID use    She is a nurse in [employer]     Review of Systems   Constitutional: Negative for chills and fever. HENT: Negative for ear pain and sore throat. Eyes: Negative for pain and visual disturbance. Respiratory: Negative for cough and shortness of breath. Cardiovascular: Negative for chest pain and palpitations. Gastrointestinal: Positive for abdominal pain. Negative for constipation, diarrhea, nausea and vomiting. Genitourinary: Negative for dysuria, frequency and hematuria. Musculoskeletal: Positive for arthralgias and myalgias. Negative for back pain. Skin: Negative for color change and rash. Neurological: Negative for seizures, syncope and headaches. All other systems reviewed and are negative.          Historical Information   Past Medical History:   Diagnosis Date   • Abnormal Pap smear of cervix    • Back pain    • GERD (gastroesophageal reflux disease)     none presantly   • Gestational diabetes w/ 1st pregnancy   • Helicobacter pylori (H. pylori) infection cleared after treatment   • Varicella     as child     Past Surgical History:   Procedure Laterality Date   • CERVICAL BIOPSY  W/ LOOP ELECTRODE EXCISION         • [place] ESOPHAGOGASTRODUODENOSCOPY TRANSORAL DIAGNOSTIC N/A 2016    Procedure: ESOPHAGOGASTRODUODENOSCOPY (EGD); Surgeon: Sanya Lutz MD;  Location: [facility];   Service: Gastroenterology   • WISDOM TOOTH EXTRACTION Bilateral 2000     Social History   Social History     Substance and Sexual Activity   Alcohol Use Yes   • Alcohol/week: 1.0 standard drink of alcohol   • Types: 1 Cans of beer per week    Comment: social     Social History     Substance and Sexual Activity   Drug Use No     Social History     Tobacco Use   Smoking Status Former   • Packs/day: 0.25   • Years: 8.00   • Total pack years: 2.00   • Types: Cigarettes   • Quit date: 2017   • Years since quittin.7   Smokeless Tobacco Never     Family History   Problem Relation Age of Onset   • Hyperlipidemia Mother    • Depression Father    • Hyperlipidemia Father    • No Known Problems Daughter    • No Known Problems Maternal Grandmother    • Cancer Maternal Grandfather         bile duct cancer/stomach ca   • Heart disease Paternal Grandfather         MI   • Depression Brother    • Mental illness Family         anxiety   • No Known Problems Maternal Aunt    • No Known Problems Paternal Aunt        Meds/Allergies       Current Outpatient Medications:   •  Cyanocobalamin (Vitamin B12 TR) 2000 MCG TBCR  •  famotidine (PEPCID) 20 mg tablet  •  multivitamin (THERAGRAN) TABS  •  neomycin-polymyxin-hydrocortisone (CORTISPORIN) otic solution  •  norgestimate-ethinyl estradiol (Ortho Tri-Cyclen Lo) 0.18/0.215/0.25 MG-25 MCG per tablet  •  omega-3-acid ethyl esters (LOVAZA) 1 g capsule  •  VITAMIN D, CHOLECALCIFEROL, PO    No Known Allergies        Objective     Wt Readings from Last 3 Encounters:   10/02/23 73.5 kg (162 lb)   23 73.8 kg (162 lb 11.2 oz)   01/26/23 74.1 kg (163 lb 6.4 oz)     Temp Readings from Last 3 Encounters:   09/25/23 98.5 °F (36.9 °C) (Oral)   01/26/23 97.9 °F (36.6 °C) (Tympanic)   12/24/22 98.2 °F (36.8 °C)     BP Readings from Last 3 Encounters:   09/25/23 126/75   01/26/23 100/70   12/24/22 144/78     Pulse Readings from Last 3 Encounters:   09/25/23 93   01/26/23 94   12/24/22 100          PHYSICAL EXAM:      Physical Exam  Vitals reviewed. Constitutional:       General: She is not in acute distress. Appearance: She is not toxic-appearing. HENT:      Head: Normocephalic and atraumatic. Eyes:      Extraocular Movements: Extraocular movements intact. Conjunctiva/sclera: Conjunctivae normal.   Cardiovascular:      Rate and Rhythm: Normal rate and regular rhythm. Pulmonary:      Effort: Pulmonary effort is normal. No respiratory distress. Breath sounds: Normal breath sounds. Abdominal:      General: Bowel sounds are normal.      Palpations: Abdomen is soft. Tenderness: There is no abdominal tenderness. Musculoskeletal:         General: No swelling or tenderness. Cervical back: Normal range of motion and neck supple. Skin:     General: Skin is warm and dry. Coloration: Skin is not jaundiced. Neurological:      General: No focal deficit present. Mental Status: She is alert and oriented to person, place, and time. Mental status is at baseline. Psychiatric:         Mood and Affect: Mood normal.         Behavior: Behavior normal.         Thought Content: Thought content normal.          Lab Results:   No visits with results within 1 Day(s) from this visit.    Latest known visit with results is:   Admission on 09/25/2023, Discharged on 09/25/2023   Component Date Value   • WBC 09/25/2023 9.33    • RBC 09/25/2023 4.52    • Hemoglobin 09/25/2023 13.9    • Hematocrit 09/25/2023 42.9    • MCV 09/25/2023 95    • MCH 09/25/2023 30.8    • MCHC 09/25/2023 32.4    • RDW 09/25/2023 12.4    • MPV 09/25/2023 9.5    • Platelets 71/05/0253 312    • nRBC 09/25/2023 0    • Neutrophils Relative 09/25/2023 69    • Immat GRANS % 09/25/2023 1    • Lymphocytes Relative 09/25/2023 24    • Monocytes Relative 09/25/2023 6    • Eosinophils Relative 09/25/2023 0    • Basophils Relative 09/25/2023 0    • Neutrophils Absolute 09/25/2023 6.42    • Immature Grans Absolute 09/25/2023 0.05    • Lymphocytes Absolute 09/25/2023 2.25    • Monocytes Absolute 09/25/2023 0.55    • Eosinophils Absolute 09/25/2023 0.03    • Basophils Absolute 09/25/2023 0.03    • Sodium 09/25/2023 136    • Potassium 09/25/2023 4.3    • Chloride 09/25/2023 102    • CO2 09/25/2023 24    • ANION GAP 09/25/2023 10    • BUN 09/25/2023 8    • Creatinine 09/25/2023 0.66    • Glucose 09/25/2023 108    • Calcium 09/25/2023 9.8    • AST 09/25/2023 16    • ALT 09/25/2023 12    • Alkaline Phosphatase 09/25/2023 63    • Total Protein 09/25/2023 8.2    • Albumin 09/25/2023 5.0    • Total Bilirubin 09/25/2023 0.66    • eGFR 09/25/2023 112    • Lipase 09/25/2023 14    • EXT Preg Test, Ur 09/25/2023 Negative    • Control 09/25/2023 Valid        Lab Results   Component Value Date    WBC 9.33 09/25/2023    HGB 13.9 09/25/2023    HCT 42.9 09/25/2023    MCV 95 09/25/2023     09/25/2023       Lab Results   Component Value Date    SODIUM 136 09/25/2023    K 4.3 09/25/2023     09/25/2023    CO2 24 09/25/2023    AGAP 10 09/25/2023    BUN 8 09/25/2023    CREATININE 0.66 09/25/2023    GLUC 108 09/25/2023    GLUF 90 07/18/2021    CALCIUM 9.8 09/25/2023    AST 16 09/25/2023    ALT 12 09/25/2023    ALKPHOS 63 09/25/2023    TP 8.2 09/25/2023    TBILI 0.66 09/25/2023    EGFR 112 09/25/2023         Radiology Results:   US right upper quadrant    Result Date: 9/25/2023  Narrative: RIGHT UPPER QUADRANT ULTRASOUND INDICATION:     Epigastric abdominal pain, concern for gallbladder pathology such as cholecystitis.  COMPARISON: 1/8/2021 TECHNIQUE:   Real-time ultrasound of the right upper quadrant was performed with a curvilinear transducer with both volumetric sweeps and still imaging techniques. FINDINGS: PANCREAS:  Visualized portions of the pancreas are within normal limits. AORTA AND IVC:  Visualized portions are normal for patient age. LIVER: Size:  Within normal range. The liver measures 13.3 cm in the midclavicular line. Contour:  Surface contour is smooth. Parenchyma:  Echogenicity and echotexture are within normal limits. No liver mass identified. Limited imaging of the main portal vein shows it to be patent and hepatopetal. BILIARY: No gallbladder findings. No intrahepatic biliary dilatation. CBD measures 2.0 mm. No choledocholithiasis. KIDNEY: Right kidney measures 10.8 x 4.4 x 4.1 cm. Volume 101.4 mL Kidney within normal limits. ASCITES:   None. Impression: Normal. Workstation performed: [id number]     CT soft tissue neck w contrast    Result Date: 9/8/2023  Narrative: CT NECK WITH CONTRAST INDICATION:   M54.2: Cervicalgia. COMPARISON: Neck ultrasound 2/13/2023. TECHNIQUE:  Axial, sagittal, and coronal 2D reformatted images were created from the axial source data and submitted for interpretation. Radiation dose length product (DLP) for this visit:  607.71 mGy-cm . This examination, like all CT scans performed in the [facility], was performed utilizing techniques to minimize radiation dose exposure, including the use of iterative  reconstruction and automated exposure control. IV Contrast:  85 mL of iohexol (OMNIPAQUE) IMAGE QUALITY:  Diagnostic. FINDINGS: VISUALIZED BRAIN PARENCHYMA:  No acute intracranial pathology of the visualized brain parenchyma. VISUALIZED ORBITS: Normal visualized orbits. PARANASAL SINUSES: Normal visualized paranasal sinuses. NASAL CAVITY AND NASOPHARYNX:  Normal. SUPRAHYOID NECK: Evaluation of oral cavity is limited due to distorted image quality by artifact from dental work.    Tongue base, palatine fossa, and epiglottis are unremarkable. INFRAHYOID NECK:  Aryepiglottic folds and piriform sinuses are normal.  Normal glottis and subglottic airway. THYROID GLAND:  Unremarkable. PAROTID AND SUBMANDIBULAR GLANDS: There is 0.8 cm right superficial parotid gland enhancing nodule versus lymph node (series 2 image 32). Left parotid and bilateral submandibular glands are unremarkable. LYMPH NODES: Multiple mildly prominent bilateral cervical zone II lymph nodes which are not pathologically enlarged by size criteria. A pronounced representative is 0.9 cm left cervical zone 2A lymph node (series 2 image 50). VASCULAR STRUCTURES:  Normal enhancement of the cervical vasculature. THORACIC INLET:  Lung apices and upper mediastinum are unremarkable. BONY STRUCTURES: There is reversal of the normal cervical lordosis. Degenerative change of the cervical spine. No acute fracture or destructive osseous lesion. Impression: 1. Multiple nonspecific mildly prominent bilateral cervical zone II lymph nodes which are not pathologically enlarged by size criteria. Recommend clinical and if indicated imaging follow-up. 2.  0.8 cm right superficial parotid gland enhancing nodule versus intraglandular lymph node. Workstation performed: [id number]       Prior Procedure Results/Reports   Last EGD reviewed done in January 2021 and was completely normal.  Gastric biopsies at that time negative for H. pylori. Small bowel biopsy negative for celiac disease. Last Colonoscopy reviewed done in January 2021 and showed 3 subcentimeter hyperplastic polyps which were removed, internal hemorrhoids. Recall colonoscopy recommended in 5 years.     Eliana Contreras PA-C   Available on [facility]

## 2023-10-04 NOTE — PATIENT INSTRUCTIONS
GERD (Gastroesophageal Reflux Disease)   AMBULATORY CARE:   Gastroesophageal reflux disease (GERD)  is reflux that happens more than 2 times a week for a few weeks. Reflux means acid and food in your stomach back up into your esophagus. GERD can cause other health problems over time if it is not treated. Common causes of GERD:  GERD often happens because the lower muscle (sphincter) of the esophagus does not close properly. The sphincter normally opens to let food into the stomach. It then closes to keep food and stomach acid in the stomach. If the sphincter does not close properly, stomach acid and food back up (reflux) into the esophagus. The following may increase your risk for GERD:  Certain foods such as spicy foods, chocolate, foods that contain caffeine, peppermint, and fried foods    Hiatal hernia    Certain medicines such as calcium channel blockers (used to treat high blood pressure), allergy medicines, sedatives, or antidepressants    Pregnancy, obesity, or scleroderma    Lying down after a meal    Drinking alcohol or smoking cigarettes    Signs and symptoms:   Heartburn (burning pain in your chest)    Pain after meals that spreads to your neck, jaw, or shoulder    Pain that gets better when you change positions    Bitter or acid taste in your mouth    A dry cough    Trouble swallowing or pain with swallowing    Hoarseness or a sore throat    Burping or hiccups    Feeling full soon after you start eating    Call your local emergency number (911 in the [address]) if:   You have severe chest pain and sudden trouble breathing. Seek care immediately if:   You have trouble breathing after you vomit. You have trouble swallowing, or pain with swallowing. Your bowel movements are black, bloody, or tarry-looking. Your vomit looks like coffee grounds or has blood in it. Call your doctor or gastroenterologist if:   You feel full and cannot burp or vomit.     You vomit large amounts, or you vomit often.    You are losing weight without trying. Your symptoms get worse or do not improve with treatment. You have questions or concerns about your condition or care. Treatment for GERD:   Medicines  are used to decrease stomach acid. Medicine may also be used to help your lower esophageal sphincter and stomach contract (tighten) more. Surgery  is done to wrap the upper part of the stomach around the esophageal sphincter. This will strengthen the sphincter and prevent reflux. Manage GERD:       Do not have foods or drinks that may increase heartburn. These include chocolate, peppermint, fried or fatty foods, drinks that contain caffeine, or carbonated drinks (soda). Other foods include spicy foods, onions, tomatoes, and tomato-based foods. Do not have foods or drinks that can irritate your esophagus, such as citrus fruits, juices, and alcohol. Do not eat large meals. When you eat a lot of food at one time, your stomach needs more acid to digest it. Eat 6 small meals each day instead of 3 large meals, and eat slowly. Do not eat meals 2 to 3 hours before bedtime. Elevate the head of your bed. Place 6-inch blocks under the head of your bed frame. You may also use more than one pillow under your head and shoulders while you sleep. Maintain a healthy weight. If you are overweight, weight loss may help relieve symptoms of GERD. Do not smoke. Smoking weakens the lower esophageal sphincter and increases the risk of GERD. Ask your healthcare provider for information if you currently smoke and need help to quit. E-cigarettes or smokeless tobacco still contain nicotine. Talk to your healthcare provider before you use these products. Do not put pressure on your abdomen. Pressure pushes acid up into your esophagus. Do not wear clothing that is tight around your waist. Do not bend over. Bend at the knees if you need to pick something up.   Follow up with your doctor or gastroenterologist as directed:  Write down your questions so you remember to ask them during your visits. © Copyright Alena Garrett 2023 Information is for End User's use only and may not be sold, redistributed or otherwise used for commercial purposes. The above information is an  only. It is not intended as medical advice for individual conditions or treatments. Talk to your doctor, nurse or pharmacist before following any medical regimen to see if it is safe and effective for you.

## 2024-01-07 ENCOUNTER — OFFICE VISIT (OUTPATIENT)
Dept: URGENT CARE | Age: 39
End: 2024-01-07
Payer: COMMERCIAL

## 2024-01-07 VITALS
RESPIRATION RATE: 18 BRPM | HEART RATE: 82 BPM | SYSTOLIC BLOOD PRESSURE: 128 MMHG | OXYGEN SATURATION: 99 % | TEMPERATURE: 97.3 F | DIASTOLIC BLOOD PRESSURE: 81 MMHG

## 2024-01-07 DIAGNOSIS — R39.9 UTI SYMPTOMS: Primary | ICD-10-CM

## 2024-01-07 LAB
SL AMB  POCT GLUCOSE, UA: ABNORMAL
SL AMB LEUKOCYTE ESTERASE,UA: ABNORMAL
SL AMB POCT BILIRUBIN,UA: ABNORMAL
SL AMB POCT BLOOD,UA: ABNORMAL
SL AMB POCT CLARITY,UA: ABNORMAL
SL AMB POCT COLOR,UA: YELLOW
SL AMB POCT KETONES,UA: ABNORMAL
SL AMB POCT NITRITE,UA: ABNORMAL
SL AMB POCT PH,UA: 7
SL AMB POCT SPECIFIC GRAVITY,UA: 1.01
SL AMB POCT URINE PROTEIN: ABNORMAL
SL AMB POCT UROBILINOGEN: 0.2

## 2024-01-07 PROCEDURE — 87086 URINE CULTURE/COLONY COUNT: CPT | Performed by: PHYSICIAN ASSISTANT

## 2024-01-07 PROCEDURE — 99213 OFFICE O/P EST LOW 20 MIN: CPT | Performed by: PHYSICIAN ASSISTANT

## 2024-01-07 PROCEDURE — 87077 CULTURE AEROBIC IDENTIFY: CPT | Performed by: PHYSICIAN ASSISTANT

## 2024-01-07 PROCEDURE — 81002 URINALYSIS NONAUTO W/O SCOPE: CPT | Performed by: PHYSICIAN ASSISTANT

## 2024-01-07 PROCEDURE — 87186 SC STD MICRODIL/AGAR DIL: CPT | Performed by: PHYSICIAN ASSISTANT

## 2024-01-07 RX ORDER — NITROFURANTOIN 25; 75 MG/1; MG/1
100 CAPSULE ORAL 2 TIMES DAILY
Qty: 14 CAPSULE | Refills: 0 | Status: SHIPPED | OUTPATIENT
Start: 2024-01-07 | End: 2024-01-14

## 2024-01-07 NOTE — PATIENT INSTRUCTIONS
Dysuria  Macrobid as directed  Follow up with PCP in 3-5 days.  Proceed to  ER if symptoms worsen.

## 2024-01-07 NOTE — PROGRESS NOTES
[facility]        NAME: Doreen Terrell is a 38 y.o. female  : 1985    MRN: [id number]  DATE: 2024  TIME: 1:06 PM    Assessment and Plan   UTI symptoms [R39.9]  1. UTI symptoms  POCT urine dip    Urine culture    nitrofurantoin (MACROBID) 100 mg capsule            Patient Instructions     Dysuria  Macrobid as directed  Follow up with PCP in 3-5 days.  Proceed to  ER if symptoms worsen.    Chief Complaint     Chief Complaint   Patient presents with    Possible UTI     Patient states that for about 2 days she has had lower abdominal discmfort wrapping around the sides and lower back. She also notes urgency and frequency to void and burning when she does. She started AZO last night and has had 2 doses. Last dose at 0930         History of Present Illness       38-year-old female who presents complaining of frequency, urgency, dysuria.  Denies fevers, chills, abdominal pain, nausea, vomiting.  Has been taking over-the-counter medications with minimal relief.        Review of Systems   Review of Systems   Constitutional: Negative.    HENT: Negative.     Eyes: Negative.    Respiratory: Negative.  Negative for cough, chest tightness, shortness of breath, wheezing and stridor.    Cardiovascular: Negative.  Negative for chest pain, palpitations and leg swelling.   Gastrointestinal: Negative.    Genitourinary:  Positive for dysuria, frequency and urgency. Negative for decreased urine volume, difficulty urinating, dyspareunia, enuresis, flank pain, genital sores, hematuria, menstrual problem, pelvic pain, vaginal bleeding, vaginal discharge and vaginal pain.         Current Medications       Current Outpatient Medications:     Cyanocobalamin (Vitamin B12 TR) 2000 MCG TBCR, Take 1 Dose by mouth once a week, Disp: , Rfl:     famotidine (PEPCID) 20 mg tablet, Take 1 tablet (20 mg total) by mouth 2 (two) times a day, Disp: 60 tablet, Rfl: 3    multivitamin (THERAGRAN) TABS, Take 1 tablet by mouth  daily PRENATAL VITAMIN, Disp: , Rfl:     nitrofurantoin (MACROBID) 100 mg capsule, Take 1 capsule (100 mg total) by mouth 2 (two) times a day for 7 days, Disp: 14 capsule, Rfl: 0    omega-3-acid ethyl esters (LOVAZA) 1 g capsule, Take 100 mg by mouth daily OMEGA +  DHA= 500MG  - KELT, Disp: , Rfl:     VITAMIN D, CHOLECALCIFEROL, PO, Take 2,000 Units by mouth daily, Disp: , Rfl:     neomycin-polymyxin-hydrocortisone (CORTISPORIN) otic solution, Administer 4 drops to the right ear every 6 (six) hours for 7 days, Disp: 10 mL, Rfl: 0    norgestimate-ethinyl estradiol (Ortho Tri-Cyclen Lo) 0.18/0.215/0.25 MG-25 MCG per tablet, Take 1 tablet by mouth daily (Patient not taking: Reported on 1/26/2023), Disp: 90 tablet, Rfl: 0    Current Allergies     Allergies as of 01/07/2024    (No Known Allergies)            The following portions of the patient's history were reviewed and updated as appropriate: allergies, current medications, past family history, past medical history, past social history, past surgical history and problem list.     Past Medical History:   Diagnosis Date    Abnormal Pap smear of cervix     Back pain     GERD (gastroesophageal reflux disease)     none presantly    Gestational diabetes w/ 1st pregnancy    Helicobacter pylori (H. pylori) infection cleared after treatment    Varicella     as child       Past Surgical History:   Procedure Laterality Date    CERVICAL BIOPSY  W/ LOOP ELECTRODE EXCISION      2012    [place] ESOPHAGOGASTRODUODENOSCOPY TRANSORAL DIAGNOSTIC N/A 6/30/2016    Procedure: ESOPHAGOGASTRODUODENOSCOPY (EGD);  Surgeon: Thomas Lopez MD;  Location: [facility] GI LAB;  Service: Gastroenterology    WISDOM TOOTH EXTRACTION Bilateral 2000       Family History   Problem Relation Age of Onset    Hyperlipidemia Mother     Depression Father     Hyperlipidemia Father     No Known Problems Daughter     No Known Problems Maternal Grandmother     Cancer Maternal Grandfather         bile duct cancer/stomach ca     Heart disease Paternal Grandfather         MI    Depression Brother     Mental illness Family         anxiety    No Known Problems Maternal Aunt     No Known Problems Paternal Aunt          Medications have been verified.        Objective   /81   Pulse 82   Temp (!) 97.3 °F (36.3 °C)   Resp 18   SpO2 99%        Physical Exam     Physical Exam  Constitutional:       Appearance: She is well-developed.   HENT:      Head: Normocephalic and atraumatic.      Right Ear: External ear normal.      Left Ear: External ear normal.      Nose: Nose normal.      Mouth/Throat:      Pharynx: No oropharyngeal exudate.   Cardiovascular:      Rate and Rhythm: Normal rate and regular rhythm.      Heart sounds: Normal heart sounds.   Pulmonary:      Effort: Pulmonary effort is normal. No respiratory distress.      Breath sounds: Normal breath sounds. No wheezing or rales.   Chest:      Chest wall: No tenderness.   Abdominal:      General: Bowel sounds are normal. There is no distension.      Palpations: Abdomen is soft. There is no mass.      Tenderness: There is no abdominal tenderness. There is no right CVA tenderness, left CVA tenderness, guarding or rebound.   Musculoskeletal:      Cervical back: Normal range of motion and neck supple.   Lymphadenopathy:      Cervical: No cervical adenopathy.

## 2024-01-09 LAB
BACTERIA UR CULT: ABNORMAL
BACTERIA UR CULT: ABNORMAL

## 2024-03-07 ENCOUNTER — OFFICE VISIT (OUTPATIENT)
Dept: FAMILY MEDICINE CLINIC | Facility: CLINIC | Age: 39
End: 2024-03-07
Payer: COMMERCIAL

## 2024-03-07 VITALS
HEART RATE: 98 BPM | BODY MASS INDEX: 29.11 KG/M2 | OXYGEN SATURATION: 99 % | RESPIRATION RATE: 16 BRPM | HEIGHT: 62 IN | SYSTOLIC BLOOD PRESSURE: 130 MMHG | WEIGHT: 158.2 LBS | TEMPERATURE: 97.8 F | DIASTOLIC BLOOD PRESSURE: 78 MMHG

## 2024-03-07 DIAGNOSIS — Z00.00 ANNUAL PHYSICAL EXAM: Primary | ICD-10-CM

## 2024-03-07 DIAGNOSIS — E53.8 B12 DEFICIENCY: ICD-10-CM

## 2024-03-07 DIAGNOSIS — K21.9 GERD WITHOUT ESOPHAGITIS: ICD-10-CM

## 2024-03-07 DIAGNOSIS — G89.29 CHRONIC BILATERAL LOW BACK PAIN WITHOUT SCIATICA: ICD-10-CM

## 2024-03-07 DIAGNOSIS — M54.50 CHRONIC BILATERAL LOW BACK PAIN WITHOUT SCIATICA: ICD-10-CM

## 2024-03-07 PROBLEM — O09.523 AMA (ADVANCED MATERNAL AGE) MULTIGRAVIDA 35+, THIRD TRIMESTER: Status: RESOLVED | Noted: 2021-06-03 | Resolved: 2024-03-07

## 2024-03-07 PROBLEM — O09.299 HISTORY OF POSTPARTUM HEMORRHAGE, CURRENTLY PREGNANT: Status: RESOLVED | Noted: 2021-07-27 | Resolved: 2024-03-07

## 2024-03-07 PROBLEM — M54.2 ANTERIOR NECK PAIN: Status: RESOLVED | Noted: 2023-01-27 | Resolved: 2024-03-07

## 2024-03-07 PROBLEM — O09.293 HISTORY OF SHOULDER DYSTOCIA IN PRIOR PREGNANCY, CURRENTLY PREGNANT IN THIRD TRIMESTER: Status: RESOLVED | Noted: 2021-06-03 | Resolved: 2024-03-07

## 2024-03-07 PROBLEM — N64.4 MASTALGIA: Status: RESOLVED | Noted: 2022-08-17 | Resolved: 2024-03-07

## 2024-03-07 PROBLEM — R10.13 DYSPEPSIA: Status: RESOLVED | Noted: 2017-05-15 | Resolved: 2024-03-07

## 2024-03-07 PROBLEM — Z34.90 ENCOUNTER FOR ELECTIVE INDUCTION OF LABOR: Status: RESOLVED | Noted: 2021-10-14 | Resolved: 2024-03-07

## 2024-03-07 PROCEDURE — 99395 PREV VISIT EST AGE 18-39: CPT | Performed by: FAMILY MEDICINE

## 2024-03-07 PROCEDURE — 99214 OFFICE O/P EST MOD 30 MIN: CPT | Performed by: FAMILY MEDICINE

## 2024-03-07 RX ORDER — OMEPRAZOLE 40 MG/1
40 CAPSULE, DELAYED RELEASE ORAL
Qty: 90 CAPSULE | Refills: 0 | Status: SHIPPED | OUTPATIENT
Start: 2024-03-07 | End: 2024-09-03

## 2024-03-07 RX ORDER — METHOCARBAMOL 500 MG/1
500 TABLET, FILM COATED ORAL 3 TIMES DAILY PRN
Qty: 30 TABLET | Refills: 0 | Status: SHIPPED | OUTPATIENT
Start: 2024-03-07

## 2024-03-07 NOTE — PROGRESS NOTES
ADULT ANNUAL PHYSICAL  [facility] PRACTICE    NAME: Doreen Terrell  AGE: 38 y.o. SEX: female  : 1985     DATE: 3/7/2024     Assessment and Plan:     Problem List Items Addressed This Visit        Digestive    GERD without esophagitis     Not controlled on pepcid  Trial omeprazole          Relevant Medications    omeprazole (PriLOSEC) 40 MG capsule       Other    RESOLVED: Annual physical exam - Primary    Relevant Orders    Comprehensive metabolic panel    CBC and differential    Lipid panel    Hemoglobin A1C   Other Visit Diagnoses     Chronic bilateral low back pain without sciatica        Relevant Medications    methocarbamol (ROBAXIN) 500 mg tablet    Other Relevant Orders    Ambulatory Referral to Physical Therapy    B12 deficiency        Relevant Orders    Vitamin B12          Immunizations and preventive care screenings were discussed with patient today. Appropriate education was printed on patient's after visit summary.    Counseling:  Alcohol/drug use: discussed moderation in alcohol intake, the recommendations for healthy alcohol use, and avoidance of illicit drug use.  Dental Health: discussed importance of regular tooth brushing, flossing, and dental visits.  Injury prevention: discussed safety/seat belts, safety helmets, smoke detectors, carbon dioxide detectors, and smoking near bedding or upholstery.  Sexual health: discussed sexually transmitted diseases, partner selection, use of condoms, avoidance of unintended pregnancy, and contraceptive alternatives.  Exercise: the importance of regular exercise/physical activity was discussed. Recommend exercise 3-5 times per week for at least 30 minutes.          No follow-ups on file.     Chief Complaint:     Chief Complaint   Patient presents with   • Physical Exam     Patient being seen for Physical Exam      History of Present Illness:     Adult Annual Physical   Patient here for a comprehensive  physical exam. The patient reports no problems.  2 kids: 4 years old and 2 years old   Low back pain for the last 3 weeks   No injury or trauma that she could recall     Diet and Physical Activity  Diet/Nutrition: well balanced diet and consuming 3-5 servings of fruits/vegetables daily.   Exercise: walking.      Depression Screening  PHQ-2/9 Depression Screening    Little interest or pleasure in doing things: 0 - not at all  Feeling down, depressed, or hopeless: 0 - not at all  PHQ-2 Score: 0  PHQ-2 Interpretation: Negative depression screen       General Health  Sleep: sleeps well.   Hearing: normal - bilateral.  Vision: goes for regular eye exams.   Dental: regular dental visits and brushes teeth twice daily.       /GYN Health  Follows with gynecology? no   Last menstrual period: regular periods   Contraceptive method:  none .  History of STDs?: no.     Advanced Care Planning  Do you have an advanced directive? no  Do you have a durable medical power of ? no  ACP document given to the patient? yes      Review of Systems:     Review of Systems   Constitutional: Negative.  Negative for chills and fever.   HENT:  Negative for ear pain and sore throat.    Eyes: Negative.  Negative for pain and visual disturbance.   Respiratory: Negative.  Negative for cough and shortness of breath.    Cardiovascular: Negative.  Negative for chest pain and palpitations.   Gastrointestinal:  Negative for abdominal pain and vomiting.   Endocrine: Negative.    Genitourinary: Negative.  Negative for dysuria and hematuria.   Musculoskeletal: Negative.  Negative for arthralgias and back pain.   Skin:  Negative for color change and rash.   Allergic/Immunologic: Negative.    Neurological: Negative.  Negative for seizures and syncope.   Hematological: Negative.    Psychiatric/Behavioral: Negative.     All other systems reviewed and are negative.     Past Medical History:     Past Medical History:   Diagnosis Date   • Abnormal Pap  smear of cervix    • AMA (advanced maternal age) multigravida 34+, third trimester    • Back pain    • GERD (gastroesophageal reflux disease)     none presantly   • Gestational diabetes w/ 1st pregnancy   • Helicobacter pylori (H. pylori) infection cleared after treatment   • History of postpartum hemorrhage, currently pregnant    • History of shoulder dystocia in prior pregnancy, currently pregnant in third trimester    • Varicella     as child      Past Surgical History:     Past Surgical History:   Procedure Laterality Date   • CERVICAL BIOPSY  W/ LOOP ELECTRODE EXCISION         • [place] ESOPHAGOGASTRODUODENOSCOPY TRANSORAL DIAGNOSTIC N/A 2016    Procedure: ESOPHAGOGASTRODUODENOSCOPY (EGD);  Surgeon: Thomas Lopez MD;  Location: [facility];  Service: Gastroenterology   • WISDOM TOOTH EXTRACTION Bilateral       Social History:     Social History     Socioeconomic History   • Marital status: /Civil Union     Spouse name: Brandon   • Number of children: 2   • Years of education: None   • Highest education level: Associate degree: academic program   Occupational History     Comment: Works at the [employer]   Tobacco Use   • Smoking status: Former     Current packs/day: 0.00     Average packs/day: 0.3 packs/day for 10.0 years (2.5 ttl pk-yrs)     Types: Cigarettes     Start date: 2009     Quit date: 2017     Years since quittin.1   • Smokeless tobacco: Never   Vaping Use   • Vaping status: Never Used   Substance and Sexual Activity   • Alcohol use: Yes     Alcohol/week: 1.0 standard drink of alcohol     Types: 1 Glasses of wine per week     Comment: social   • Drug use: No   • Sexual activity: Yes     Partners: Male     Birth control/protection: Condom Male     Comment: Brandon   Other Topics Concern   • None   Social History Narrative   • None     Social Determinants of Health     Financial Resource Strain: Not on file   Food Insecurity: Not on file   Transportation Needs: Not on file  "  Physical Activity: Not on file   Stress: Not on file   Social Connections: Not on file   Intimate Partner Violence: Not on file   Housing Stability: Not on file      Family History:     Family History   Problem Relation Age of Onset   • Hyperlipidemia Mother    • Depression Father    • Hyperlipidemia Father    • No Known Problems Daughter    • No Known Problems Maternal Grandmother    • Cancer Maternal Grandfather         bile duct cancer/stomach ca   • Heart disease Paternal Grandfather         MI   • Depression Brother    • Hypertension Brother    • Mental illness Family         anxiety   • No Known Problems Maternal Aunt    • No Known Problems Paternal Aunt       Current Medications:     Current Outpatient Medications   Medication Sig Dispense Refill   • Cyanocobalamin (Vitamin B12 TR) 2000 MCG TBCR Take 1 Dose by mouth once a week     • methocarbamol (ROBAXIN) 500 mg tablet Take 1 tablet (500 mg total) by mouth 3 (three) times a day as needed for muscle spasms 30 tablet 0   • multivitamin (THERAGRAN) TABS Take 1 tablet by mouth daily PRENATAL VITAMIN     • omega-3-acid ethyl esters (LOVAZA) 1 g capsule Take 100 mg by mouth daily OMEGA +  DHA= 500MG  - KELT     • omeprazole (PriLOSEC) 40 MG capsule Take 1 capsule (40 mg total) by mouth daily before breakfast 90 capsule 0   • VITAMIN D, CHOLECALCIFEROL, PO Take 2,000 Units by mouth daily       No current facility-administered medications for this visit.      Allergies:     No Known Allergies   Physical Exam:     /78 (BP Location: Left arm, Patient Position: Sitting, Cuff Size: Standard)   Pulse 98   Temp 97.8 °F (36.6 °C) (Tympanic)   Resp 16   Ht 5' 2.48\" (1.587 m)   Wt 71.8 kg (158 lb 3.2 oz)   SpO2 99%   BMI 28.49 kg/m²     Physical Exam  Vitals and nursing note reviewed.   Constitutional:       Appearance: She is well-developed.   HENT:      Head: Normocephalic and atraumatic.      Right Ear: Tympanic membrane normal.      Left Ear: Tympanic " membrane normal.      Nose: Nose normal.      Mouth/Throat:      Mouth: Mucous membranes are moist.   Eyes:      Pupils: Pupils are equal, round, and reactive to light.   Cardiovascular:      Rate and Rhythm: Normal rate and regular rhythm.      Pulses: Normal pulses.      Heart sounds: Normal heart sounds.   Pulmonary:      Effort: Pulmonary effort is normal. No respiratory distress.      Breath sounds: Normal breath sounds. No wheezing.   Abdominal:      General: Bowel sounds are normal.      Palpations: Abdomen is soft.   Musculoskeletal:         General: No deformity. Normal range of motion.      Cervical back: Normal range of motion and neck supple.   Skin:     General: Skin is warm.   Neurological:      General: No focal deficit present.      Mental Status: She is alert and oriented to person, place, and time.   Psychiatric:         Mood and Affect: Mood normal.         Behavior: Behavior normal.          MD ALISSA Lyon [facility]

## 2024-03-20 ENCOUNTER — OFFICE VISIT (OUTPATIENT)
Dept: GASTROENTEROLOGY | Facility: CLINIC | Age: 39
End: 2024-03-20
Payer: COMMERCIAL

## 2024-03-20 VITALS
TEMPERATURE: 98.2 F | HEIGHT: 62 IN | SYSTOLIC BLOOD PRESSURE: 118 MMHG | DIASTOLIC BLOOD PRESSURE: 70 MMHG | BODY MASS INDEX: 28.71 KG/M2 | WEIGHT: 156 LBS

## 2024-03-20 DIAGNOSIS — K21.9 GASTROESOPHAGEAL REFLUX DISEASE WITHOUT ESOPHAGITIS: ICD-10-CM

## 2024-03-20 DIAGNOSIS — R10.13 EPIGASTRIC ABDOMINAL PAIN: Primary | ICD-10-CM

## 2024-03-20 DIAGNOSIS — K21.9 GERD WITHOUT ESOPHAGITIS: ICD-10-CM

## 2024-03-20 PROCEDURE — 99214 OFFICE O/P EST MOD 30 MIN: CPT | Performed by: INTERNAL MEDICINE

## 2024-03-20 RX ORDER — FAMOTIDINE 20 MG/1
20 TABLET, FILM COATED ORAL 2 TIMES DAILY
Qty: 60 TABLET | Refills: 7 | Status: SHIPPED | OUTPATIENT
Start: 2024-03-20 | End: 2024-04-19

## 2024-03-20 NOTE — PROGRESS NOTES
[facility]  Progress Note - Doreen Terrell 38 y.o. female MRN: [id number]    Unit/Bed#:  Encounter: [id number]    Assessment/Plan:  1. Epigastric abdominal pain  - Celiac Disease Panel; Future    2. Gastroesophageal reflux disease without esophagitis  3.  History of colon polyps  - famotidine (PEPCID) 20 mg tablet; Take 1 tablet (20 mg total) by mouth 2 (two) times a day  Dispense: 60 tablet; Refill: 7  -Repeat colonoscopy in 2026 last colonoscopy performed 2021 identified to have diminutive polyps  -Discussed improving sleep hygiene which may be also impacting circadian rhythm waking reflux symptoms worse  -She is 2 years postpartum with a very active work lifestyle she is trying to continue to improve activity and  prepregnancy weight which will help with reflux  -she would prefer not to use PPI therapy and is happy with Pepcid twice daily with good control of symptoms overall  -We may consider endoscopic evaluation in the future or increase Pepcid to 40 mg twice daily  In the future we can consider EGD with [facility]-on current regimen versus off  -We discussed holding off on hepatobiliary testing as back pain is improving with acupuncture  -We also discussed checking celiac disease serologies.  She has previously on a vegan diet but not ever on a gluten-free diet.  Subjective:     She is a very pleasant 38-year-old female with history of acid reflux disease, remote history of H. pylori presents here for evaluation for ongoing abdominal discomfort and acid reflux disease.  There was a concern that abdominal discomfort may be related to gastrointestinal source but symptoms have improved with acupuncture.  She is scheduled for a HIDA scan tomorrow but due to improvement of symptoms she would like to hold off on this.  Extensive evaluation including CBC, CMP, lipase have been within normal limits.  GI cocktail in the past and currently on Pepcid twice daily dosing has improved symptoms.  She would  "like to continue to be off PPI therapy.  Symptoms have worsened since weight gain secondary to pregnancy as well as abrupt sleep pattern.  She is also under increased stressors but coping with them well.  Denies any nausea, vomiting, fevers, chills.      Objective:     Vitals: Blood pressure 118/70, temperature 98.2 °F (36.8 °C), temperature source Tympanic, height 5' 2\" (1.575 m), weight 70.8 kg (156 lb), not currently breastfeeding.,Body mass index is 28.53 kg/m².    [unfilled]    Physical Exam:    GEN: wn/wd, NAD  HEENT: MMM, no cervical or supraclavicular LAD, anciteric  CV: RRR, no m/r/g  CHEST: CTA b/l, no w/r/r  ABD: +BS, soft, NT/ND, no hepatosplenomegaly  EXT: no c/c/e  SKIN: no rashes  NEURO: aaox3      Invasive Devices       Peripheral Intravenous Line  Duration             Peripheral IV 09/25/23 Right Antecubital 176 days                            Lab, Imaging and other studies:     No visits with results within 1 Day(s) from this visit.   Latest known visit with results is:   Office Visit on 01/07/2024   Component Date Value    LEUKOCYTE ESTERASE,UA 01/07/2024 large     NITRITE,UA 01/07/2024 neg     [facility] POCT UROBILINOGEN 01/07/2024 0.2     POCT URINE PROTEIN 01/07/2024 neg      PH,UA 01/07/2024 7.0     BLOOD,UA 01/07/2024 large     SPECIFIC GRAVITY,UA 01/07/2024 1.010     KETONES,UA 01/07/2024 neg     BILIRUBIN,UA 01/07/2024 mod     GLUCOSE, UA 01/07/2024 neg      COLOR,UA 01/07/2024 yellow     CLARITY,UA 01/07/2024 cloudy     Urine Culture 01/07/2024 10,000-19,000 cfu/ml Escherichia coli (A)     Urine Culture 01/07/2024 10,000-19,000 cfu/ml Lactobacillus species (A)              No current facility-administered medications for this visit.             "

## 2024-04-02 NOTE — PROGRESS NOTES
PT Evaluation     Today's date: 2024  Patient name: Doreen Terrell  : 1985  MRN: [id number]  Referring provider: Ana Carpenter MD  Dx:   Encounter Diagnosis     ICD-10-CM    1. Chronic bilateral low back pain without sciatica  M54.50 Ambulatory Referral to Physical Therapy    G89.29       2. Sciatic nerve pain, left  M54.32           Start Time: 1730  Stop Time: 1820  Total time in clinic (min): 50 minutes    Assessment  Assessment details: Doreen Terrell is a pleasant 38 y.o. female who presents with acute on chronic low back pain secondary to prolonged poor posture. Pt demonstrates loss of lumbar lordosis when sitting and improved symptoms following repeated extensions. She demonstrates increased L side sciatic nerve tension and reproducible left sided low back pain when tested. Lastly she demonstrates difficulty activating and decreased strength in L sided glute musculature and deep core muscles. She was educated on being aware of her posture when sitting, adding a lumbar roll to her work chair, and using proper lifting mechanics when picking her kids up. She was given an HEP focused on repeated extension, posture and core strengthening and instructed to stop performing if symptoms increased. The patient's greatest concerns are the pain she is experiencing, worry over not knowing what's wrong, and fear of not being able to keep active.      No further referral appears necessary at this time based upon examination results.    Primary movement impairment diagnosis of lumbar posture syndrome resulting in pathoanatomical symptoms of pain and limiting her ability to lift, sit, and squat to  objects from the floor.    Primary Impairments:  1) poor posture  2) weak proximal hip musculature    Etiologic factors include repetitive poor body mechanics and poor ergonomics.    Impairments: abnormal muscle tone, abnormal or restricted ROM, activity intolerance, impaired physical strength, lacks  appropriate home exercise program, poor posture  and poor body mechanics    Symptom irritability: lowUnderstanding of Dx/Px/POC: good   Prognosis: good  Prognosis details: Positive prognostic indicators include positive attitude toward recovery, good understanding of diagnosis and treatment plan options, and absence of observed red flags.  Negative prognostic indicators include high symptom irritability and obesity.      Goals  Short Term Goals: to be achieved by 4 weeks  1) Patient to be independent with basic HEP  2) Decrease pain to 3/10 at its worst  3) Increase lumbar spine AROM by 10% in all deficient planes   4) Increase LE strength by 1/2 MMT grade in all deficient planes    Long Term Goals: to be achieved by discharge  1) FOTO equal to or greater than 79  2) Patient to be independent with comprehensive HEP  3) Lumbar spine ROM WNL all planes to improve a/iadls  4) Increase LE strength to 5/5 MMT grade in all planes to improve a/iadls  5) Increase tolerance for seated activities to >90 min.    Plan  Patient would benefit from: skilled physical therapy  Referral necessary: No  Planned modality interventions: Modalities PRN.  Planned therapy interventions: activity modification, manual therapy, neuromuscular re-education, patient education, therapeutic activities, therapeutic exercise, graded activity, home exercise program, behavior modification and self care  Frequency: 1x week  Duration in weeks: 8  Treatment plan discussed with: patient      Subjective Evaluation    History of Present Illness  Mechanism of injury: History of Current Injury: Pt reports CLBP that is more on the middle lower left side that began about 2 months ago after sleeping in weird positions with her kids in the bed. 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 The pain in the past wasn't as deep as this.   Pain location/Descriptors: lower back  dull/deep ache.  Aggravating factors: laying on her sides, static positions, sitting for long times  Easing factors: standing, walking, stretching, yoga  AM/PM pattern: worse in the morning and at night  Imaging: none  Special Questions: Pt denies radiating pain, LE pain, N/T, unexpected weight loss, ataxia, changes in bowel/bladder, saddle paresthesia.   Patient goals: Pt wants to be able to become more active and gain her core back.   Occupation: access           Teddy probe    Quality of life: good    Pain  Current pain ratin  At best pain ratin  At worst pain ratin    Social Support  Stairs in house: yes   Lives in: multiple-level home  Lives with: young children and spouse    Employment status: working      Objective     Palpation   Left   Hypertonic in the quadratus lumborum.   Tenderness of the quadratus lumborum.     Active Range of Motion     Lumbar   Flexion:  WFL and with pain  Extension:  Restriction level: minimal  Left lateral flexion:  WFL and with pain  Right lateral flexion:  WFL  Left rotation:  WFL  Right rotation:  WFL    Joint Play   Joints within functional limits: L1, L2, L3 and L4     Hypomobile: L5 and S1   Mechanical Assessment    Cervical      Thoracic      Lumbar    Standing extension: repeated movements  Pain intensity: better  Pain level: decreased  Lying extension: repeated movements  Pain location: no change    Strength/Myotome Testing     Lumbar   Left   Heel walk: normal  Toe walk: normal    Right   Heel walk: normal  Toe walk: normal    Left Hip   Planes of Motion   Flexion: 4+  Extension: 4+  Abduction: 4  Adduction: 5  External rotation: 5  Internal rotation: 5    Right Hip   Planes of Motion   Flexion: 5  Extension: 5  Abduction: 4+  Adduction: 5  External rotation: 5  Internal rotation: 5    Left Knee   Flexion: 5  Extension: 5    Right Knee   Flexion: 5  Extension: 5    Left Ankle/Foot   Dorsiflexion: 5  Plantar flexion: 5  Inversion:  "5  Eversion: 5  Great toe extension: 5    Right Ankle/Foot   Dorsiflexion: 5  Plantar flexion: 5  Inversion: 5  Eversion: 5  Great toe extension: 5    Tests     Lumbar   Negative SIJ compression, sacroiliac distraction, sacral thrust  and sacral spring .     Left   Positive passive SLR, quadrant and slump test.     Right   Negative passive SLR, quadrant and slump test.     Left Pelvic Girdle/Sacrum   Negative: active SLR test.     Right Pelvic Girdle/Sacrum   Negative: active SLR test.     Left Hip   Positive VALERIE.   Negative FADIR.     Right Hip   Negative VALERIE and FADIR.       Flowsheet Rows      Flowsheet Row Most Recent Value   PT/OT G-Codes    Current Score 69   Projected Score 79               Precautions: acute on chronic LBP       4/4            Manuals                                                                 Neuro Re-Ed             TrA activation 20x5\" HEP            90/90 Sciatic nerve glide hooklying 2x10 HEP            Prone hip extension (glute focus)                                                    Posture education, lumbar roll 5'            Ther Ex             bridges 2x10x3\"            Standing lumbar extension against table 2x10 HEP            Standing hip abduction             Standing hip extension             squats             Dead lifts                                       Ther Activity                                       Gait Training                                       Modalities                                       Assessment IE, POC, Prognosis            Education HEP, POC, Prognosis                 "

## 2024-04-04 ENCOUNTER — EVALUATION (OUTPATIENT)
Dept: PHYSICAL THERAPY | Facility: CLINIC | Age: 39
End: 2024-04-04
Payer: COMMERCIAL

## 2024-04-04 DIAGNOSIS — M54.50 CHRONIC BILATERAL LOW BACK PAIN WITHOUT SCIATICA: Primary | ICD-10-CM

## 2024-04-04 DIAGNOSIS — M54.32 SCIATIC NERVE PAIN, LEFT: ICD-10-CM

## 2024-04-04 DIAGNOSIS — G89.29 CHRONIC BILATERAL LOW BACK PAIN WITHOUT SCIATICA: Primary | ICD-10-CM

## 2024-04-04 PROCEDURE — 97110 THERAPEUTIC EXERCISES: CPT | Performed by: PHYSICAL THERAPIST

## 2024-04-04 PROCEDURE — 97161 PT EVAL LOW COMPLEX 20 MIN: CPT | Performed by: PHYSICAL THERAPIST

## 2024-04-04 PROCEDURE — 97112 NEUROMUSCULAR REEDUCATION: CPT | Performed by: PHYSICAL THERAPIST

## 2024-04-07 ENCOUNTER — OFFICE VISIT (OUTPATIENT)
Dept: URGENT CARE | Age: 39
End: 2024-04-07
Payer: COMMERCIAL

## 2024-04-07 VITALS
OXYGEN SATURATION: 99 % | RESPIRATION RATE: 20 BRPM | DIASTOLIC BLOOD PRESSURE: 76 MMHG | SYSTOLIC BLOOD PRESSURE: 116 MMHG | BODY MASS INDEX: 21.14 KG/M2 | HEART RATE: 86 BPM | TEMPERATURE: 98.1 F | WEIGHT: 115.6 LBS

## 2024-04-07 DIAGNOSIS — H10.13 ALLERGIC CONJUNCTIVITIS OF BOTH EYES: ICD-10-CM

## 2024-04-07 DIAGNOSIS — J30.9 ALLERGIC RHINITIS, UNSPECIFIED SEASONALITY, UNSPECIFIED TRIGGER: Primary | ICD-10-CM

## 2024-04-07 PROCEDURE — 99213 OFFICE O/P EST LOW 20 MIN: CPT | Performed by: STUDENT IN AN ORGANIZED HEALTH CARE EDUCATION/TRAINING PROGRAM

## 2024-04-07 RX ORDER — FLUTICASONE PROPIONATE 50 MCG
1 SPRAY, SUSPENSION (ML) NASAL DAILY
Qty: 11.1 ML | Refills: 0 | Status: SHIPPED | OUTPATIENT
Start: 2024-04-07

## 2024-04-07 RX ORDER — OLOPATADINE HYDROCHLORIDE 1 MG/ML
1 SOLUTION/ DROPS OPHTHALMIC 2 TIMES DAILY
Qty: 5 ML | Refills: 0 | Status: SHIPPED | OUTPATIENT
Start: 2024-04-07

## 2024-04-07 RX ORDER — CETIRIZINE HYDROCHLORIDE 10 MG/1
10 TABLET ORAL DAILY
Qty: 30 TABLET | Refills: 0 | Status: SHIPPED | OUTPATIENT
Start: 2024-04-07

## 2024-04-07 NOTE — PATIENT INSTRUCTIONS
I suspect that your symptoms are coming from allergies.  I recommend taking daily antihistamine such as Zyrtec or Claritin.  Using daily Flonase which is a steroid nasal spray to help reduce inflammation in the sinuses and postnasal drip as well as help open the eustachian tube so you start feeling the ear symptoms.  I am sending in olopatadine which is an allergic eyedrop to help with itching.  You may also use artificial tears drops for comfort and warm compresses to help reduce crusting.  Saline nasal spray can also be helpful to get pollen and other allergens out of the nasal passages and reduce your reaction to them.  These medications are all available over-the-counter if they are not covered by insurance.  If you feel that despite the above, your symptoms are worsening or not improving over the coming week, please follow-up with your PCP.

## 2024-04-07 NOTE — PROGRESS NOTES
[facility]        NAME: Doreen Terrell is a 38 y.o. female  : 1985    MRN: [id number]  DATE: 2024  TIME: 11:24 AM    Assessment and Plan   Allergic rhinitis, unspecified seasonality, unspecified trigger [J30.9]  1. Allergic rhinitis, unspecified seasonality, unspecified trigger  cetirizine (ZyrTEC) 10 mg tablet    fluticasone (FLONASE) 50 mcg/act nasal spray      2. Allergic conjunctivitis of both eyes  olopatadine (PATANOL) 0.1 % ophthalmic solution            Patient Instructions   I suspect that your symptoms are coming from allergies.  I recommend taking daily antihistamine such as Zyrtec or Claritin.  Using daily Flonase which is a steroid nasal spray to help reduce inflammation in the sinuses and postnasal drip as well as help open the eustachian tube so you start feeling the ear symptoms.  I am sending in olopatadine which is an allergic eyedrop to help with itching.  You may also use artificial tears drops for comfort and warm compresses to help reduce crusting.  Saline nasal spray can also be helpful to get pollen and other allergens out of the nasal passages and reduce your reaction to them.  These medications are all available over-the-counter if they are not covered by insurance.  If you feel that despite the above, your symptoms are worsening or not improving over the coming week, please follow-up with your PCP.    Follow up with PCP in 3-5 days.  Proceed to  ER if symptoms worsen.    If tests have been performed at [facility], our office will contact you with results if changes need to be made to the care plan discussed with you at the visit.  You can review your full results on [facility].    Chief Complaint     Chief Complaint   Patient presents with    Cold Like Symptoms     Congestion started on Tuesday. Taking dayquil/ nightquil. B/l eye redness and drainage started x1 week. Patient reports daughter had pink eye . She has been using her medication to help with  "symptoms . No changes reported. Symptoms getting worse. Left ear  \" popping \".     Conjunctivitis    Ear Fullness         History of Present Illness       She started about 1 week ago with bilateral eye redness and discharge.  Has been using her daughters antibiotic eyedrops without change in her symptoms for 1 week.  She still has crusting in the morning, eye itching.  About 5 to 6 days ago developed sinus/nasal congestion, ear popping, throat scratchiness.  Does not typically have issues with allergies.  No fevers, no shortness of breath.  Taking DayQuil and NyQuil, somewhat helpful but then wears off.        Review of Systems   Review of Systems   All other systems reviewed and are negative.        Current Medications       Current Outpatient Medications:     cetirizine (ZyrTEC) 10 mg tablet, Take 1 tablet (10 mg total) by mouth daily, Disp: 30 tablet, Rfl: 0    Cyanocobalamin (Vitamin B12 TR) 2000 MCG TBCR, Take 1 Dose by mouth once a week, Disp: , Rfl:     famotidine (PEPCID) 20 mg tablet, Take 1 tablet (20 mg total) by mouth 2 (two) times a day, Disp: 60 tablet, Rfl: 7    fluticasone (FLONASE) 50 mcg/act nasal spray, 1 spray into each nostril daily, Disp: 11.1 mL, Rfl: 0    multivitamin (THERAGRAN) TABS, Take 1 tablet by mouth daily PRENATAL VITAMIN, Disp: , Rfl:     olopatadine (PATANOL) 0.1 % ophthalmic solution, Administer 1 drop to both eyes 2 (two) times a day, Disp: 5 mL, Rfl: 0    omega-3-acid ethyl esters (LOVAZA) 1 g capsule, Take 100 mg by mouth daily OMEGA +  DHA= 500MG  - KELT, Disp: , Rfl:     VITAMIN D, CHOLECALCIFEROL, PO, Take 2,000 Units by mouth daily, Disp: , Rfl:     methocarbamol (ROBAXIN) 500 mg tablet, Take 1 tablet (500 mg total) by mouth 3 (three) times a day as needed for muscle spasms, Disp: 30 tablet, Rfl: 0    Current Allergies     Allergies as of 04/07/2024    (No Known Allergies)            The following portions of the patient's history were reviewed and updated as appropriate: " allergies, current medications, past family history, past medical history, past social history, past surgical history and problem list.     Past Medical History:   Diagnosis Date    Abnormal Pap smear of cervix     AMA (advanced maternal age) multigravida 35+, third trimester     Back pain     GERD (gastroesophageal reflux disease)     none presantly    Gestational diabetes w/ 1st pregnancy    Helicobacter pylori (H. pylori) infection cleared after treatment    History of postpartum hemorrhage, currently pregnant     History of shoulder dystocia in prior pregnancy, currently pregnant in third trimester     Varicella     as child       Past Surgical History:   Procedure Laterality Date    CERVICAL BIOPSY  W/ LOOP ELECTRODE EXCISION      2012    CT ESOPHAGOGASTRODUODENOSCOPY TRANSORAL DIAGNOSTIC N/A 6/30/2016    Procedure: ESOPHAGOGASTRODUODENOSCOPY (EGD);  Surgeon: Thomas Lopez MD;  Location: [facility];  Service: Gastroenterology    WISDOM TOOTH EXTRACTION Bilateral 2000       Family History   Problem Relation Age of Onset    Hyperlipidemia Mother     Depression Father     Hyperlipidemia Father     No Known Problems Daughter     No Known Problems Maternal Grandmother     Cancer Maternal Grandfather         bile duct cancer/stomach ca    Heart disease Paternal Grandfather         MI    Depression Brother     Hypertension Brother     Mental illness Family         anxiety    No Known Problems Maternal Aunt     No Known Problems Paternal Aunt          Medications have been verified.        Objective   /76   Pulse 86   Temp 98.1 °F (36.7 °C) (Tympanic)   Resp 20   Wt 52.4 kg (115 lb 9.6 oz)   LMP 03/31/2024   SpO2 99%   BMI 21.14 kg/m²   Patient's last menstrual period was 03/31/2024.       Physical Exam     Physical Exam  Constitutional:       General: She is not in acute distress.     Appearance: Normal appearance. She is not ill-appearing or toxic-appearing.   HENT:      Head: Normocephalic and  atraumatic.      Right Ear: Tympanic membrane, ear canal and external ear normal.      Left Ear: Tympanic membrane, ear canal and external ear normal.      Nose: Congestion present.      Mouth/Throat:      Mouth: Mucous membranes are moist.      Pharynx: No oropharyngeal exudate or posterior oropharyngeal erythema.      Comments: Cobblestoning  Eyes:      Extraocular Movements: Extraocular movements intact.      Comments: Mild edema of eyelids with scant clear yellow discharge.  Very mild bilateral injection   Cardiovascular:      Rate and Rhythm: Normal rate and regular rhythm.      Heart sounds: Normal heart sounds.   Pulmonary:      Effort: Pulmonary effort is normal. No respiratory distress.      Breath sounds: Normal breath sounds. No stridor. No wheezing, rhonchi or rales.   Skin:     General: Skin is warm and dry.      Capillary Refill: Capillary refill takes less than 2 seconds.      Findings: No rash.   Neurological:      Mental Status: She is alert.      Gait: Gait normal.   Psychiatric:         Behavior: Behavior normal.

## 2024-04-10 ENCOUNTER — OFFICE VISIT (OUTPATIENT)
Dept: FAMILY MEDICINE CLINIC | Facility: CLINIC | Age: 39
End: 2024-04-10
Payer: COMMERCIAL

## 2024-04-10 VITALS
WEIGHT: 153 LBS | BODY MASS INDEX: 28.16 KG/M2 | TEMPERATURE: 97.8 F | HEART RATE: 83 BPM | SYSTOLIC BLOOD PRESSURE: 110 MMHG | OXYGEN SATURATION: 98 % | RESPIRATION RATE: 16 BRPM | HEIGHT: 62 IN | DIASTOLIC BLOOD PRESSURE: 70 MMHG

## 2024-04-10 DIAGNOSIS — K21.9 GASTROESOPHAGEAL REFLUX DISEASE WITHOUT ESOPHAGITIS: ICD-10-CM

## 2024-04-10 DIAGNOSIS — J03.90 ACUTE TONSILLITIS, UNSPECIFIED ETIOLOGY: Primary | ICD-10-CM

## 2024-04-10 DIAGNOSIS — J02.9 SORE THROAT: ICD-10-CM

## 2024-04-10 PROBLEM — M54.9 MID BACK PAIN, CHRONIC: Status: ACTIVE | Noted: 2024-04-10

## 2024-04-10 PROBLEM — G89.29 MID BACK PAIN, CHRONIC: Status: ACTIVE | Noted: 2024-04-10

## 2024-04-10 LAB — S PYO AG THROAT QL: NEGATIVE

## 2024-04-10 PROCEDURE — 87880 STREP A ASSAY W/OPTIC: CPT | Performed by: FAMILY MEDICINE

## 2024-04-10 PROCEDURE — 99213 OFFICE O/P EST LOW 20 MIN: CPT | Performed by: FAMILY MEDICINE

## 2024-04-10 RX ORDER — FAMOTIDINE 40 MG/1
40 TABLET, FILM COATED ORAL 2 TIMES DAILY
Qty: 60 TABLET | Refills: 0 | Status: SHIPPED | OUTPATIENT
Start: 2024-04-10

## 2024-04-10 RX ORDER — AMOXICILLIN AND CLAVULANATE POTASSIUM 875; 125 MG/1; MG/1
1 TABLET, FILM COATED ORAL EVERY 12 HOURS SCHEDULED
Qty: 20 TABLET | Refills: 0 | Status: SHIPPED | OUTPATIENT
Start: 2024-04-10 | End: 2024-04-20

## 2024-04-10 NOTE — PROGRESS NOTES
Name: Doreen Terrell      : 1985      MRN: [id number]  Encounter Provider: Ana Carpenter MD  Encounter Date: 4/10/2024   Encounter department: [facility]    Assessment & Plan     1. Acute tonsillitis, unspecified etiology  -     amoxicillin-clavulanate (AUGMENTIN) 875-125 mg per tablet; Take 1 tablet by mouth every 12 (twelve) hours for 10 days    2. Sore throat  -     POCT rapid ANTIGEN strepA    3. Gastroesophageal reflux disease without esophagitis  -     famotidine (PEPCID) 40 MG tablet; Take 1 tablet (40 mg total) by mouth 2 (two) times a day    Negative rapid strep  Will treat based on symptoms        Subjective     Sore Throat   This is a new problem. The current episode started in the past 7 days. The problem has been waxing and waning. There has been no fever. Associated symptoms include congestion, coughing and trouble swallowing. Pertinent negatives include no abdominal pain, diarrhea, drooling, ear discharge, ear pain, headaches, hoarse voice, plugged ear sensation, neck pain, shortness of breath, stridor, swollen glands or vomiting. She has had no exposure to strep or mono. She has tried nothing for the symptoms.     Review of Systems   Constitutional: Negative.    HENT:  Positive for congestion, sore throat and trouble swallowing. Negative for drooling, ear discharge, ear pain, hoarse voice and rhinorrhea.    Respiratory:  Positive for cough. Negative for shortness of breath and stridor.    Cardiovascular: Negative.    Gastrointestinal:  Negative for abdominal pain, diarrhea and vomiting.   Musculoskeletal:  Negative for neck pain.   Neurological:  Negative for headaches.       Past Medical History:   Diagnosis Date   • Abnormal Pap smear of cervix    • AMA (advanced maternal age) multigravida 35+, third trimester    • Back pain    • GERD (gastroesophageal reflux disease)     none presantly   • Gestational diabetes w/ 1st pregnancy   • Helicobacter pylori (H. pylori)  infection cleared after treatment   • History of postpartum hemorrhage, currently pregnant    • History of shoulder dystocia in prior pregnancy, currently pregnant in third trimester    • Varicella     as child     Past Surgical History:   Procedure Laterality Date   • CERVICAL BIOPSY  W/ LOOP ELECTRODE EXCISION         • HI ESOPHAGOGASTRODUODENOSCOPY TRANSORAL DIAGNOSTIC N/A 2016    Procedure: ESOPHAGOGASTRODUODENOSCOPY (EGD);  Surgeon: Thomas Lopze MD;  Location: [facility] GI LAB;  Service: Gastroenterology   • WISDOM TOOTH EXTRACTION Bilateral      Family History   Problem Relation Age of Onset   • Hyperlipidemia Mother    • Depression Father    • Hyperlipidemia Father    • No Known Problems Daughter    • No Known Problems Maternal Grandmother    • Cancer Maternal Grandfather         bile duct cancer/stomach ca   • Heart disease Paternal Grandfather         MI   • Depression Brother    • Hypertension Brother    • Mental illness Family         anxiety   • No Known Problems Maternal Aunt    • No Known Problems Paternal Aunt      Social History     Socioeconomic History   • Marital status: /Civil Union     Spouse name: Brandon   • Number of children: 2   • Years of education: None   • Highest education level: Associate degree: academic program   Occupational History     Comment: Works at the [employer]   Tobacco Use   • Smoking status: Former     Current packs/day: 0.00     Average packs/day: 0.3 packs/day for 10.0 years (2.5 ttl pk-yrs)     Types: Cigarettes     Start date: 2009     Quit date: 2017     Years since quittin.2   • Smokeless tobacco: Never   Vaping Use   • Vaping status: Never Used   Substance and Sexual Activity   • Alcohol use: Yes     Alcohol/week: 1.0 standard drink of alcohol     Types: 1 Glasses of wine per week     Comment: social   • Drug use: No   • Sexual activity: Yes     Partners: Male     Birth control/protection: Condom Male     Comment: Brandon   Other Topics  Concern   • None   Social History Narrative   • None     Social Determinants of Health     Financial Resource Strain: Not on file   Food Insecurity: Not on file   Transportation Needs: Not on file   Physical Activity: Not on file   Stress: Not on file   Social Connections: Not on file   Intimate Partner Violence: Not on file   Housing Stability: Not on file     Current Outpatient Medications on File Prior to Visit   Medication Sig   • cetirizine (ZyrTEC) 10 mg tablet Take 1 tablet (10 mg total) by mouth daily   • Cyanocobalamin (Vitamin B12 TR) 2000 MCG TBCR Take 1 Dose by mouth once a week   • fluticasone (FLONASE) 50 mcg/act nasal spray 1 spray into each nostril daily   • multivitamin (THERAGRAN) TABS Take 1 tablet by mouth daily PRENATAL VITAMIN   • olopatadine (PATANOL) 0.1 % ophthalmic solution Administer 1 drop to both eyes 2 (two) times a day   • omega-3-acid ethyl esters (LOVAZA) 1 g capsule Take 100 mg by mouth daily OMEGA +  DHA= 500MG  - KELT   • VITAMIN D, CHOLECALCIFEROL, PO Take 2,000 Units by mouth daily   • [DISCONTINUED] famotidine (PEPCID) 20 mg tablet Take 1 tablet (20 mg total) by mouth 2 (two) times a day   • [DISCONTINUED] methocarbamol (ROBAXIN) 500 mg tablet Take 1 tablet (500 mg total) by mouth 3 (three) times a day as needed for muscle spasms     No Known Allergies  Immunization History   Administered Date(s) Administered   • COVID-19 PFIZER VACCINE 0.3 ML IM 12/21/2020, 01/12/2021, 10/30/2021   • DT (pediatric) 08/31/1999   • DTaP 1984, 01/31/1985, 03/28/1985, 11/13/1989   • Hep B, adult 09/01/2000, 10/09/2000, 05/22/2001   • HiB 09/16/1986   • INFLUENZA 10/01/2012, 11/19/2022, 11/04/2023   • Influenza, injectable, quadrivalent, preservative free 0.5 mL 10/16/2021   • MMR 01/02/1985, 09/26/1991   • Meningococcal MCV4P 04/09/2002   • Rho (D) Immune Globulin 06/15/2018, 05/30/2019, 08/19/2019, 07/15/2021, 10/15/2021   • Td (adult), adsorbed 03/18/2006   • Tdap 01/11/2011, 05/30/2019,  "08/11/2021   • Tuberculin Skin Test-PPD Intradermal 01/05/2011, 01/14/2011, 05/07/2012       Objective     /70 (BP Location: Left arm, Patient Position: Sitting, Cuff Size: Standard)   Pulse 83   Temp 97.8 °F (36.6 °C) (Tympanic)   Resp 16   Ht 5' 2\" (1.575 m)   Wt 69.4 kg (153 lb)   LMP 03/31/2024   SpO2 98%   BMI 27.98 kg/m²     Physical Exam  Constitutional:       Appearance: She is well-developed.   HENT:      Right Ear: Tympanic membrane normal.      Left Ear: Tympanic membrane normal.      Nose: Congestion present. No rhinorrhea.      Mouth/Throat:      Mouth: Mucous membranes are moist.      Tonsils: Tonsillar exudate present. 2+ on the right. 2+ on the left.   Cardiovascular:      Rate and Rhythm: Normal rate and regular rhythm.      Heart sounds: Normal heart sounds.   Neurological:      Mental Status: She is alert and oriented to person, place, and time.       Ana Carpenter MD    "

## 2024-04-12 ENCOUNTER — OFFICE VISIT (OUTPATIENT)
Dept: PHYSICAL THERAPY | Facility: CLINIC | Age: 39
End: 2024-04-12
Payer: COMMERCIAL

## 2024-04-12 DIAGNOSIS — G89.29 CHRONIC BILATERAL LOW BACK PAIN WITHOUT SCIATICA: Primary | ICD-10-CM

## 2024-04-12 DIAGNOSIS — M54.50 CHRONIC BILATERAL LOW BACK PAIN WITHOUT SCIATICA: Primary | ICD-10-CM

## 2024-04-12 DIAGNOSIS — M54.32 SCIATIC NERVE PAIN, LEFT: ICD-10-CM

## 2024-04-12 PROCEDURE — 97112 NEUROMUSCULAR REEDUCATION: CPT | Performed by: PHYSICAL THERAPIST

## 2024-04-12 PROCEDURE — 97110 THERAPEUTIC EXERCISES: CPT | Performed by: PHYSICAL THERAPIST

## 2024-04-12 PROCEDURE — 97140 MANUAL THERAPY 1/> REGIONS: CPT | Performed by: PHYSICAL THERAPIST

## 2024-04-12 NOTE — PROGRESS NOTES
"Daily Note     Today's date: 2024  Patient name: Doreen Terrell  : 1985  MRN: [id number]  Referring provider: Ana Carpenter MD  Dx:   Encounter Diagnosis     ICD-10-CM    1. Chronic bilateral low back pain without sciatica  M54.50     G89.29       2. Sciatic nerve pain, left  M54.32                      Subjective: Pt reports she is feeling much better and since Wednesday hasn't had any radiating pain and all discomfort has been central low back. She is getting to the exercises once/day and they seem to help a lot. Most of her discomfort occurs at the end of the day.       Objective: See treatment diary below      Assessment: Pt was educated on trying to get a few sets of standing extension in the morning to see if this improves her overall symptoms and discomfort at the end of the day. Pt had improved lumbar extension following P-A mobilizations and near abolition of pain following repeated extensions. Pt required cueing for form with squats and dead lifts and still had a slight butt wink when working into deeper ranges but no pain.     Plan: Pt is to schedule one more visit however if returning to working out without issue she will cancel and discharge her last appointment.      Precautions: acute on chronic LBP                  Manuals             Lumbar P-A mobs  L3-L4 30x Gr. III, L4-S1 2x30 Gr. III                                                  Neuro Re-Ed             TrA activation 20x5\" HEP            90/90 Sciatic nerve glide hooklying 2x10 HEP 2x10 ea contra leg straight           Prone hip extension (glute focus)             Pallof press  RTB 2x10 ea                                     Posture education, lumbar roll 5'            Ther Ex             bridges 2x10x3\" 10x3\"           Standing lumbar extension against table 2x10 HEP 2x10 breath out           Standing hip abduction             Standing hip extension             squats  10x, 10x 10# KB           Dead lifts  20# 2x10   "                                   Ther Activity                                       Gait Training                                       Modalities                                       Assessment IE, POC, Prognosis            Education HEP, POC, Prognosis

## 2024-06-05 ENCOUNTER — OFFICE VISIT (OUTPATIENT)
Dept: FAMILY MEDICINE CLINIC | Facility: CLINIC | Age: 39
End: 2024-06-05
Payer: COMMERCIAL

## 2024-06-05 VITALS
BODY MASS INDEX: 28.16 KG/M2 | WEIGHT: 153 LBS | TEMPERATURE: 97.9 F | SYSTOLIC BLOOD PRESSURE: 112 MMHG | HEART RATE: 90 BPM | OXYGEN SATURATION: 98 % | DIASTOLIC BLOOD PRESSURE: 80 MMHG | RESPIRATION RATE: 16 BRPM | HEIGHT: 62 IN

## 2024-06-05 DIAGNOSIS — R39.9 UTI SYMPTOMS: Primary | ICD-10-CM

## 2024-06-05 LAB
SL AMB  POCT GLUCOSE, UA: NEGATIVE
SL AMB LEUKOCYTE ESTERASE,UA: ABNORMAL
SL AMB POCT BILIRUBIN,UA: NEGATIVE
SL AMB POCT BLOOD,UA: NEGATIVE
SL AMB POCT CLARITY,UA: ABNORMAL
SL AMB POCT COLOR,UA: YELLOW
SL AMB POCT KETONES,UA: NEGATIVE
SL AMB POCT NITRITE,UA: NEGATIVE
SL AMB POCT PH,UA: 7
SL AMB POCT SPECIFIC GRAVITY,UA: 1.01
SL AMB POCT URINE PROTEIN: NEGATIVE
SL AMB POCT UROBILINOGEN: NEGATIVE

## 2024-06-05 PROCEDURE — 87086 URINE CULTURE/COLONY COUNT: CPT | Performed by: NURSE PRACTITIONER

## 2024-06-05 PROCEDURE — 81002 URINALYSIS NONAUTO W/O SCOPE: CPT | Performed by: NURSE PRACTITIONER

## 2024-06-05 PROCEDURE — 99213 OFFICE O/P EST LOW 20 MIN: CPT | Performed by: NURSE PRACTITIONER

## 2024-06-05 RX ORDER — SULFAMETHOXAZOLE AND TRIMETHOPRIM 800; 160 MG/1; MG/1
1 TABLET ORAL EVERY 12 HOURS SCHEDULED
Qty: 6 TABLET | Refills: 0 | Status: SHIPPED | OUTPATIENT
Start: 2024-06-05 | End: 2024-06-08

## 2024-06-05 NOTE — ASSESSMENT & PLAN NOTE
Urine dip positive for leukocytes and otherwise normal.  Will send sample for culture.  Pt is going out of town this weekend.  Will send prescription for bactrim to pharmacy.  Can start if symptoms worsen, otherwise defer starting until culture result is back.  Ok to continue azo for now,.

## 2024-06-06 LAB — BACTERIA UR CULT: NORMAL

## 2024-06-07 ENCOUNTER — TELEPHONE (OUTPATIENT)
Dept: FAMILY MEDICINE CLINIC | Facility: CLINIC | Age: 39
End: 2024-06-07

## 2024-06-07 NOTE — TELEPHONE ENCOUNTER
----- Message from SHARRON Mena sent at 6/7/2024  7:40 AM EDT -----  Please notify pt that her culture was negative.  For further follow up she could consider seeing gyn or I can refer to urology.  MF

## 2024-06-11 ENCOUNTER — TELEPHONE (OUTPATIENT)
Dept: FAMILY MEDICINE CLINIC | Facility: CLINIC | Age: 39
End: 2024-06-11

## 2024-07-25 DIAGNOSIS — Z00.00 ANNUAL PHYSICAL EXAM: Primary | ICD-10-CM

## 2024-07-25 DIAGNOSIS — Z12.31 ENCOUNTER FOR SCREENING MAMMOGRAM FOR BREAST CANCER: Primary | ICD-10-CM

## 2024-09-08 ENCOUNTER — APPOINTMENT (OUTPATIENT)
Dept: LAB | Facility: CLINIC | Age: 39
End: 2024-09-08
Payer: COMMERCIAL

## 2024-09-08 DIAGNOSIS — Z00.8 ENCOUNTER FOR OTHER GENERAL EXAMINATION: ICD-10-CM

## 2024-09-08 DIAGNOSIS — E53.8 B12 DEFICIENCY: ICD-10-CM

## 2024-09-08 LAB
ALBUMIN SERPL BCG-MCNC: 4.4 G/DL (ref 3.5–5)
ALP SERPL-CCNC: 48 U/L (ref 34–104)
ALT SERPL W P-5'-P-CCNC: 20 U/L (ref 7–52)
ANION GAP SERPL CALCULATED.3IONS-SCNC: 8 MMOL/L (ref 4–13)
AST SERPL W P-5'-P-CCNC: 18 U/L (ref 13–39)
BASOPHILS # BLD AUTO: 0.05 THOUSANDS/ÂΜL (ref 0–0.1)
BASOPHILS NFR BLD AUTO: 1 % (ref 0–1)
BILIRUB SERPL-MCNC: 0.57 MG/DL (ref 0.2–1)
BUN SERPL-MCNC: 8 MG/DL (ref 5–25)
CALCIUM SERPL-MCNC: 9.3 MG/DL (ref 8.4–10.2)
CHLORIDE SERPL-SCNC: 103 MMOL/L (ref 96–108)
CHOLEST SERPL-MCNC: 248 MG/DL
CO2 SERPL-SCNC: 26 MMOL/L (ref 21–32)
CREAT SERPL-MCNC: 0.56 MG/DL (ref 0.6–1.3)
EOSINOPHIL # BLD AUTO: 0.18 THOUSAND/ÂΜL (ref 0–0.61)
EOSINOPHIL NFR BLD AUTO: 2 % (ref 0–6)
ERYTHROCYTE [DISTWIDTH] IN BLOOD BY AUTOMATED COUNT: 12.2 % (ref 11.6–15.1)
EST. AVERAGE GLUCOSE BLD GHB EST-MCNC: 108 MG/DL
GFR SERPL CREATININE-BSD FRML MDRD: 117 ML/MIN/1.73SQ M
GLUCOSE P FAST SERPL-MCNC: 104 MG/DL (ref 65–99)
HBA1C MFR BLD: 5.4 %
HCT VFR BLD AUTO: 37.9 % (ref 34.8–46.1)
HDLC SERPL-MCNC: 76 MG/DL
HGB BLD-MCNC: 12.6 G/DL (ref 11.5–15.4)
IMM GRANULOCYTES # BLD AUTO: 0.04 THOUSAND/UL (ref 0–0.2)
IMM GRANULOCYTES NFR BLD AUTO: 1 % (ref 0–2)
LDLC SERPL CALC-MCNC: 141 MG/DL (ref 0–100)
LYMPHOCYTES # BLD AUTO: 2.65 THOUSANDS/ÂΜL (ref 0.6–4.47)
LYMPHOCYTES NFR BLD AUTO: 32 % (ref 14–44)
MCH RBC QN AUTO: 31.7 PG (ref 26.8–34.3)
MCHC RBC AUTO-ENTMCNC: 33.2 G/DL (ref 31.4–37.4)
MCV RBC AUTO: 96 FL (ref 82–98)
MONOCYTES # BLD AUTO: 0.6 THOUSAND/ÂΜL (ref 0.17–1.22)
MONOCYTES NFR BLD AUTO: 7 % (ref 4–12)
NEUTROPHILS # BLD AUTO: 4.65 THOUSANDS/ÂΜL (ref 1.85–7.62)
NEUTS SEG NFR BLD AUTO: 57 % (ref 43–75)
NONHDLC SERPL-MCNC: 172 MG/DL
NRBC BLD AUTO-RTO: 0 /100 WBCS
PLATELET # BLD AUTO: 279 THOUSANDS/UL (ref 149–390)
PMV BLD AUTO: 9.5 FL (ref 8.9–12.7)
POTASSIUM SERPL-SCNC: 4 MMOL/L (ref 3.5–5.3)
PROT SERPL-MCNC: 7.4 G/DL (ref 6.4–8.4)
RBC # BLD AUTO: 3.97 MILLION/UL (ref 3.81–5.12)
SODIUM SERPL-SCNC: 137 MMOL/L (ref 135–147)
TRIGL SERPL-MCNC: 157 MG/DL
TSH SERPL DL<=0.05 MIU/L-ACNC: 0.56 UIU/ML (ref 0.45–4.5)
VIT B12 SERPL-MCNC: 427 PG/ML (ref 180–914)
WBC # BLD AUTO: 8.17 THOUSAND/UL (ref 4.31–10.16)

## 2024-09-08 PROCEDURE — 82607 VITAMIN B-12: CPT

## 2024-09-11 ENCOUNTER — NURSE TRIAGE (OUTPATIENT)
Age: 39
End: 2024-09-11

## 2024-09-11 NOTE — TELEPHONE ENCOUNTER
"Periods are historically irregular, LMP was on or about 7/4/24, the week of July 4th she believes. Friday 9/6 had very positive pregnancy test. Patient states she is getting nauseated in the AM which has subsided. Feeling bloated and has breast tenderness. Patient states she feels as she did with other pregnancies. Was seeing Dr. Crawford and wants to establish OB care with [facility] office. Appointment scheduled for first available D&V 10/10/24. Appointment added to wait list for sooner scheduling if possible. Patient advised to call back any needs.    Answer Assessment - Initial Assessment Questions  1. LMP:  \"When did your last menstrual period begin?\"      7/4/24        3. REGULARITY: \"How regular are your periods?\"      Irregular   4. PREGNANCY: \"Is there any chance you are pregnant?\" (e.g., unprotected intercourse, missed birth control pill, broken condom) \"Have you used a home pregnancy test?\"      yes  5. BREASTFEEDING: \"Are you breastfeeding?\"      no  6. BIRTH CONTROL PILLS: \"Are you taking birth control pills, or have you stopped recently?\"      no  7. DEPOPROVERA: \"Has your doctor given you a shot to prevent pregnancy?\" (e.g., Depoprovera injection)      no  8. CAUSE: \"What do you think caused the missed period?\" (e.g., stress, rapid weight loss, excessive exercise)      pregnancy  9. OTHER SYMPTOMS: \"Do you have any other symptoms?\" (e.g., abdominal pain)      Breast tenderness and bloating    Protocols used: Menstrual Period - Missed or Late-ADULT-OH    "

## 2024-09-13 ENCOUNTER — TELEPHONE (OUTPATIENT)
Age: 39
End: 2024-09-13

## 2024-09-13 NOTE — TELEPHONE ENCOUNTER
Patient called office stating she is concerned her dating and viability ultrasound is scheduled too far out, her LMP was 7/4/24. Attempted to schedule sooner, no appointments available until October. Warm transfer to [facility] in office for sooner appointment.

## 2024-09-16 ENCOUNTER — ULTRASOUND (OUTPATIENT)
Dept: OBGYN CLINIC | Facility: CLINIC | Age: 39
End: 2024-09-16
Payer: COMMERCIAL

## 2024-09-16 VITALS — DIASTOLIC BLOOD PRESSURE: 62 MMHG | BODY MASS INDEX: 29.37 KG/M2 | SYSTOLIC BLOOD PRESSURE: 108 MMHG | WEIGHT: 160.6 LBS

## 2024-09-16 DIAGNOSIS — N91.2 AMENORRHEA: Primary | ICD-10-CM

## 2024-09-16 PROCEDURE — 99213 OFFICE O/P EST LOW 20 MIN: CPT | Performed by: OBSTETRICS & GYNECOLOGY

## 2024-09-16 PROCEDURE — 76817 TRANSVAGINAL US OBSTETRIC: CPT | Performed by: OBSTETRICS & GYNECOLOGY

## 2024-09-16 RX ORDER — GINSENG 100 MG
CAPSULE ORAL
COMMUNITY
Start: 2024-09-02

## 2024-09-16 RX ORDER — KELP 150 MCG
TABLET ORAL
COMMUNITY

## 2024-09-16 RX ORDER — FOLIC ACID 0.8 MG
TABLET ORAL
COMMUNITY
Start: 2024-09-02

## 2024-09-16 NOTE — PROGRESS NOTES
Assessment/Plan:  Diagnoses and all orders for this visit:    Amenorrhea  -     Ambulatory Referral to Maternal Fetal Medicine; Future  -     AMB US OB < 14 weeks single or first gestation level 1    Other orders  -     folic acid (FOLVITE) 800 MCG tablet  -     Kelp 0.15 MG TABS  -     Zinc 50 MG TABS  -     Prenatal Vit-Fe Fumarate-FA (PRENATAL PO); Take by mouth        - Viable IUP @ 10w 4d EGA  - GUILLAUME 4/10/24  - Continue PNV  - Patient to call for concerns  - RTO ~ 10-12 weeks for OB intake  - call MFM for 13 week NT scan/genetic testing.           Subjective:       Patient ID: Doreen Terrell 1985        Doreen Terrell is a 38 y.o.  presenting to the office for pregnancy confirmation. Patient's last menstrual period was 2024 (approximate). , placing her at 10w4d today with GUILLAUME of 4/10. She is feeling well. She has irregular periods. She is pretty sure her LMP was around fourth of July. H/o 2 prior SVDs. Denies any bleeding or cramping.         OB History    Para Term  AB Living   4 2 2 0 1 2   SAB IAB Ectopic Multiple Live Births   1 0 0 0 2      # Outcome Date GA Lbr Josue/2nd Weight Sex Type Anes PTL Lv   4 Current            3 Term 10/14/21 40w0d / 00:19 3345 g (7 lb 6 oz) F Vag-Spont EPI N FAHAD   2 Term 19 39w5d / 02:14 3714 g (8 lb 3 oz) F Vag-Spont EPI N FAHAD      Complications: Shoulder Dystocia   1 SAB                  The following portions of the patient's history were reviewed and updated as appropriate: allergies, current medications, past family history, past medical history, past social history, past surgical history, and problem list.    Allergies:  Patient has no known allergies.    Medications:    Current Outpatient Medications:     Cyanocobalamin (Vitamin B12 TR) 2000 MCG TBCR, Take 1 Dose by mouth once a week, Disp: , Rfl:     folic acid (FOLVITE) 800 MCG tablet, , Disp: , Rfl:     Kelp 0.15 MG TABS, , Disp: , Rfl:     omega-3-acid ethyl esters (LOVAZA) 1  g capsule, Take 100 mg by mouth daily OMEGA +  DHA= 500MG  - KELT, Disp: , Rfl:     Prenatal Vit-Fe Fumarate-FA (PRENATAL PO), Take by mouth, Disp: , Rfl:     VITAMIN D, CHOLECALCIFEROL, PO, Take 2,000 Units by mouth daily, Disp: , Rfl:     Zinc 50 MG TABS, , Disp: , Rfl:     cetirizine (ZyrTEC) 10 mg tablet, Take 1 tablet (10 mg total) by mouth daily, Disp: 30 tablet, Rfl: 0    famotidine (PEPCID) 40 MG tablet, Take 1 tablet (40 mg total) by mouth 2 (two) times a day (Patient not taking: Reported on 9/16/2024), Disp: 60 tablet, Rfl: 0    fluticasone (FLONASE) 50 mcg/act nasal spray, 1 spray into each nostril daily, Disp: 11.1 mL, Rfl: 0    multivitamin (THERAGRAN) TABS, Take 1 tablet by mouth daily PRENATAL VITAMIN (Patient not taking: Reported on 9/16/2024), Disp: , Rfl:     olopatadine (PATANOL) 0.1 % ophthalmic solution, Administer 1 drop to both eyes 2 (two) times a day, Disp: 5 mL, Rfl: 0      Review of Systems:   Review of Systems   Constitutional:  Negative for chills and fever.   Respiratory:  Negative for shortness of breath.    Cardiovascular:  Negative for chest pain.   Gastrointestinal:  Negative for nausea and vomiting.   Genitourinary:  Negative for vaginal bleeding.   Musculoskeletal: Negative.           Objective:       Visit Vitals  /62 (BP Location: Right arm, Patient Position: Sitting, Cuff Size: Standard)   Wt 72.8 kg (160 lb 9.6 oz)   LMP 07/04/2024 (Approximate)   BMI 29.37 kg/m²   OB Status Pregnant   Smoking Status Former   BSA 1.74 m²        GEN: The patient was alert and oriented x3, pleasant well-appearing female in no acute distress.   CV: Regular rate  PULM: nonlabored respirations  MSK: Normal gait  : normal external female genitalia   Skin: warm, dry  Neuro: no focal deficits  Psych: normal affect and judgement, cooperative    Ultrasound:     Viability US     Gestational sac: present               Location: intrauterine  Yolk sac: present  Fetal pole: present                CRL: 3.66 cm = 10w4d  Cardiac activity: present               Rate: 167 bpm     Ovaries: normal appearing bilaterally  Cul de sac: absence of free fluid  Uterus: normal in appearance           Ultrasound Probe Disinfection    A transvaginal ultrasound was performed.   Prior to use, disinfection was performed with High Level Disinfection Process (Trophon)  Probe serial number [id number]: [id number] was used    I have spent a total time of 30 minutes in caring for this patient on the day of the visit/encounter including Diagnostic results, Counseling / Coordination of care, Documenting in the medical record, and Reviewing / ordering tests, medicine, procedures  .      Eve Lynne MD  09/16/24  2:46 PM

## 2024-09-30 ENCOUNTER — OB ABSTRACT (OUTPATIENT)
Dept: OBGYN CLINIC | Facility: CLINIC | Age: 39
End: 2024-09-30

## 2024-09-30 NOTE — PATIENT INSTRUCTIONS
.Congratulations!! Please review our Pregnancy Essential Guide and [facility] L&D Virtual tour from our networks website.     [facility] Pregnancy Essentials Guide  [facility] ([email or web address])     [facility] ([employer])

## 2024-10-01 ENCOUNTER — INITIAL PRENATAL (OUTPATIENT)
Dept: OBGYN CLINIC | Facility: CLINIC | Age: 39
End: 2024-10-01

## 2024-10-01 VITALS — HEIGHT: 62 IN | WEIGHT: 160 LBS | BODY MASS INDEX: 29.44 KG/M2

## 2024-10-01 DIAGNOSIS — Z31.430 ENCOUNTER OF FEMALE FOR TESTING FOR GENETIC DISEASE CARRIER STATUS FOR PROCREATIVE MANAGEMENT: ICD-10-CM

## 2024-10-01 DIAGNOSIS — Z86.32 HISTORY OF GESTATIONAL DIABETES: ICD-10-CM

## 2024-10-01 DIAGNOSIS — Z34.81 PRENATAL CARE, SUBSEQUENT PREGNANCY, FIRST TRIMESTER: ICD-10-CM

## 2024-10-01 DIAGNOSIS — Z36.9 UNSPECIFIED ANTENATAL SCREENING: Primary | ICD-10-CM

## 2024-10-01 PROCEDURE — OBC

## 2024-10-01 NOTE — PROGRESS NOTES
.  OB INTAKE INTERVIEW  Patient is 38 y.o. who presents for OB intake at 12.5wks  She is accompanied by herself during this encounter  The father of her baby (Brandon) is involved in the pregnancy and is 40 years old.      Last Menstrual Period: 2024  Ultrasound: Measured 10 weeks 4 days on 2024  Estimated Date of Delivery: 04/10/2025  confirmed by  10.4 week US    Signs/Symptoms of Pregnancy  Current pregnancy symptoms: Bloating   Constipation no  Headaches no  Cramping/spotting no  PICA cravings no    Diabetes-  Body mass index is 29.26 kg/m².  If patient has 1 or more, please order early 1 hour GTT  History of GDM YES  BMI >35 no  History of PCOS or current metformin use no  History of LGA/macrosomic infant (4000g/9lbs) no    If patient has 2 or more, please order early 1 hour GTT  BMI>30 no  AMA YES  First degree relative with type 2 diabetes no  History of chronic HTN, hyperlipidemia, elevated A1C no  High risk race (, , ,  or ) no    Hypertension- if you answer yes to any of the following, please order baseline preeclampsia labs (cbc, comprehensive metabolic panel, urine protein creatinine ratio, uric acid)  History of of chronic HTN no  History of gestational HTN no  History of preeclampsia, eclampsia, or HELLP syndrome no  History of diabetes no  History of lupus, autoimmune disease, kidney disease no    Thyroid- if yes order TSH with reflex T4  History of thyroid disease no    Bleeding Disorder or Hx of DVT-patient or first degree relative with history of. Order the following if not done previously.   (Factor V, antithrombin III, prothrombin gene mutation, protein C and S Ag, lupus anticoagulant, anticardiolipin, beta-2 glycoprotein)   no    OB/GYN-  History of abnormal pap smear YES       Date of last pap smear 2022  History of HPV YES  History of Herpes/HSV no  History of other STI (gonorrhea, chlamydia, trich) no  History of  prior  YES  History of prior  no  History of  delivery prior to 36 weeks 6 days no  History of Varicella or Vaccination  as a child   History of blood transfusion no  Ok for blood transfusion yes    Substance screening-   History of tobacco use YES  Currently using tobacco no  Substance Use Screen Level  N/A    MRSA Screening-   Does the pt have a hx of MRSA? no    Immunizations:  Influenza vaccine given this season yes  Discussed Tdap vaccine yes  Discussed COVID Vaccine yes    Genetic/[facility]-  Do you or your partner have a history of any of the following in yourselves or first degree relatives?  Cystic fibrosis no  Spinal muscular atrophy no  Hemoglobinopathy/Sickle Cell/Thalassemia no  Fragile X Intellectual Disability no         discuss Carrier Screening being completed once in a lifetime as a standard of care lab test.  ordered    Appointment for Nuchal Translucency Ultrasound at [facility] scheduled for 10/02/2024      Interview education  [facility] Pregnancy Essentials Book reviewed, discussed and attached to their AVS  yes    Nurse/Family Partnership- patient may qualify  No referral placed  No    Prenatal lab work scripts yes  Extra labs ordered:  1 hr gtt  Genetic testing    Aspirin/Preeclampsia Screen    Risk Level Risk Factor Recommendation   LOW Prior Uncomplicated full-term delivery YES No Aspirin recommendation        MODERATE Nulliparity no Recommend low-dose aspirin if     BMI>30 no 2 or more moderate risk factors    Family History Preeclampsia (mother/sister) no     35yr old or greater YES     Black Race, Concern for SDOH/Low Socioeconomic no     IVF Pregnancy  no     Personal History Risks (low birth weight, prior adverse preg outcome, >10yr preg interval) no         HIGH History of Preeclampsia no Recommend low-dose aspirin if     Multifetal gestation no 1 or more high risk factors    Chronic HTN no     Type 1 or 2 Diabetes no     Renal Disease no     Autoimmune Disease  no       Contraindications to ASA therapy:  NSAID/ ASA allergy: no  Nasal polyps: no  Asthma with history of ASA induced bronchospasm: no  Relative contraindications:  History of GI bleed: no  Active peptic ulcer disease: no  Severe hepatic dysfunction: no    Patient should be recommended to take ASA 162mg during this pregnancy from 12-36wks to lower her risk of preeclampsia:  Discussed      The patient has a history now or in prior pregnancy notable for:  gestational DM      Details that I feel the provider should be aware of: This ia a planned and welcomed pregnancy for parents. Patient is feeling generally well. She is 38 years old , and her 1st pregnancy she was DX with GDM , at Delivery , Delivery was complicated with shoulder Dystocia,as well as postpartum Hemorrhage and  postpartum Depression  .Pt knows when to call Doctor and how to contact her nurse navigator. Patient verbalized understanding.Patient knows to have lab orders completed before next appointment.          PN1 visit scheduled. The patient was oriented to our practice, the navigator role, reviewed delivering physicians and [facility] for Delivery. All questions were answered.    Interviewed by: Electronically Signed by Monica Khan LPN ,OB Nurse Navigator

## 2024-10-02 ENCOUNTER — ROUTINE PRENATAL (OUTPATIENT)
Facility: HOSPITAL | Age: 39
End: 2024-10-02
Attending: OBSTETRICS & GYNECOLOGY
Payer: COMMERCIAL

## 2024-10-02 ENCOUNTER — TELEMEDICINE (OUTPATIENT)
Facility: HOSPITAL | Age: 39
End: 2024-10-02
Payer: COMMERCIAL

## 2024-10-02 VITALS
HEIGHT: 62 IN | DIASTOLIC BLOOD PRESSURE: 70 MMHG | SYSTOLIC BLOOD PRESSURE: 118 MMHG | HEART RATE: 110 BPM | BODY MASS INDEX: 29.49 KG/M2 | WEIGHT: 160.27 LBS

## 2024-10-02 DIAGNOSIS — Z86.32 HISTORY OF GESTATIONAL DIABETES IN PRIOR PREGNANCY, CURRENTLY PREGNANT IN FIRST TRIMESTER: ICD-10-CM

## 2024-10-02 DIAGNOSIS — Z31.5 ENCOUNTER FOR PROCREATIVE GENETIC COUNSELING: ICD-10-CM

## 2024-10-02 DIAGNOSIS — Z36.0 ENCOUNTER FOR ANTENATAL SCREENING FOR CHROMOSOMAL ANOMALIES: ICD-10-CM

## 2024-10-02 DIAGNOSIS — Z3A.12 12 WEEKS GESTATION OF PREGNANCY: ICD-10-CM

## 2024-10-02 DIAGNOSIS — O28.3 ABNORMAL PRENATAL ULTRASOUND: Primary | ICD-10-CM

## 2024-10-02 DIAGNOSIS — O09.291 HISTORY OF GESTATIONAL DIABETES IN PRIOR PREGNANCY, CURRENTLY PREGNANT IN FIRST TRIMESTER: ICD-10-CM

## 2024-10-02 DIAGNOSIS — O09.291 H/O SHOULDER DYSTOCIA IN PRIOR PREGNANCY, CURRENTLY PREGNANT, FIRST TRIMESTER: ICD-10-CM

## 2024-10-02 DIAGNOSIS — O09.521 MULTIGRAVIDA OF ADVANCED MATERNAL AGE IN FIRST TRIMESTER: ICD-10-CM

## 2024-10-02 DIAGNOSIS — O28.3 INCREASED NUCHAL TRANSLUCENCY SPACE ON FETAL ULTRASOUND: Primary | ICD-10-CM

## 2024-10-02 PROCEDURE — 76813 OB US NUCHAL MEAS 1 GEST: CPT | Performed by: OBSTETRICS & GYNECOLOGY

## 2024-10-02 PROCEDURE — 76801 OB US < 14 WKS SINGLE FETUS: CPT | Performed by: OBSTETRICS & GYNECOLOGY

## 2024-10-02 PROCEDURE — 99204 OFFICE O/P NEW MOD 45 MIN: CPT | Performed by: OBSTETRICS & GYNECOLOGY

## 2024-10-02 NOTE — LETTER
October 3, 2024     Eve Lynne MD  [address]  [address]  [place] [address]    Patient: Doreen Terrell   YOB: 1985   Date of Visit: 10/2/2024       Dear Dr. Lynne:    Thank you for referring Doreen Terrell to me for evaluation. Below are my notes for this consultation.    If you have questions, please do not hesitate to call me. I look forward to following your patient along with you.         Sincerely,        Mary Ann Rodriguez MD        CC: No Recipients    Mary Ann Rodriguez MD  10/3/2024  4:26 AM  Sign when Signing Visit  OFFICE CONSULT  Referring physician:   Eve Lynne Md  [address]  [address]  [place] [address]      Dear Dr. Lynne      Thank you for requesting a  consultation on your patient Ms. Doreen Terrell for the following indications:  Genetic screening    History  Medications: Vitamin B12, folate, omega-3 capsule, prenatal vitamins, zinc, vitamin D  Allergies to medications: None  Past medical history: Advanced maternal age, history of gestational diabetes with her first pregnancy, history of a prior pregnancy with shoulder dystocia, history of anxiety currently requiring no medication, history of reflux  Past surgical history: [place] tooth extraction, LEEP  Past obstetrical history:   2018 9-week SAB  2019 at 39 weeks and 5 days she had an 8 pound 3 ounce baby girl vaginally.  That pregnancy was complicated by mild shoulder dystocia and gestational diabetes and postpartum hemorrhage  10/14/2021 at 40 weeks she has 7 pound 6 ounce baby girl without complications  Social history: She reports no substance use and is a former smoker  First generation family history: Hypertension in her father    Ultrasound findings:  The ultrasound shows a fetus concordant with dates. The nasal bone appears normal.  The nuchal translucency appears thickened at 4.4 mm.  We are also unable to clear the normal views on the heart at this gestational  age increasing the suspicion of a possible heart defect. No other malformations are seen on today's early ultrasound.     The patient was informed of the findings and counseled about the limitations of the exam in detecting all forms of fetal congenital abnormalities.    She does not report any vaginal bleeding or uterine cramping or contractions.      Specific counseling was provided on the following problems:  We discussed the options for genetic screening which include invasive testing on the fetal placenta or on fetal skin cells within the amniotic fluid and compared this to noninvasive testing which includes cell free DNA screening.  We reviewed the risks, the benefits and the limitations of each.  In the end patient chose to complete the cell free DNA screen.  To help aid her in this decision she was set up to speak with genetic counseling later today at 1 PM.  Nuchal thickening is associated with aneuploidy. The risk of aneuploidy is based on how thick this nuchal area is. One study showed the following risks in their population: 21% risk of aneuploidy found for a nuchal thickening of 3.5 to 4.4 mm  Approximately 50 percent of fetuses with increased nuchal translucency and an abnormal karyotype had trisomy 21; the rest had other chromosomal abnormalities. Most fetuses with trisomy 21 had a nuchal thickness less than 4.5 mm. By comparison, most fetuses with trisomy 13, trisomy 18, and Amaro syndrome had nuchal thickness =4.5 mm.  Cardiac defects are the most common malformation associated with increased nuchal translucency. NT measurements of 3.5mm or more can be associated with 23/1000 risk that a fetal cardiac defect will be found in the pregnancy.   If genetic screening returns as normal and if future ultrasounds show resolution of this nuchal translucency and no other evidence for a cardiac malformation or any other malformations by her 20-week scan than 98% of those pregnancies will be normal at  birth.  With a history of a prior shoulder dystocia she is at increased risk for having a recurrence.  It is reassuring that s her last pregnancy was unaffected by shoulder dystocia.  Recommend a third trimester ultrasound for a better estimate of the baby's weight prior to delivery.  Recommend early screening for gestational diabetes and if normal recommend screening again at 28 weeks.      Future ultrasounds ordered today:   Fetal Level II ultrasound imaging is recommended 16 weeks for early anatomy.  After her visit with genetic counseling she is now set up for a [facility] on 10/7/2024    Pre visit time reviewing her records   10 minutes  Face to face time 15 minutes  Post visit time on documentation of note, updating her problem list, adding orders and prescriptions 20 minutes.  Procedures that were completed today were charged separately.   The level of decision making was moderate complexity.    Mary Ann Rodriguez MD

## 2024-10-02 NOTE — PROGRESS NOTES
Genetic Counseling Note    Appointment Date:  10/2/2024  Referred By: Jodi Vargas, *  YOB: 1985  Partner:  Brandon Terrell  Indication for Visit:  abnormal ultrasound screen  Pregnancy History:   Estimated Date of Delivery: 4/10/2025  Estimated Gestational Age: 12w6d      Virtual Regular Visit  Name: Doreen Terrell      : 1985      MRN: [id number]  Encounter Provider: Joyce Owens  Encounter Date: 10/2/2024   Encounter department: [facility]    Verification of patient location:    Patient is located at Home in the following state in which I hold an active license: [place]    Assessment & Plan        Encounter provider Joyce Owens    The patient was identified by name and date of birth. Dorene Terrell was informed that this is a telemedicine visit and that the visit is being conducted through the Epic Embedded platform. She agrees to proceed. My office door was closed. No one else was in the room. She acknowledged consent and understanding of privacy and security of the video platform. The patient has agreed to participate and understands they can discontinue the visit at any time.    Doreen is a 38 y.o. female who presented with her partner Brandon for genetic counseling to discuss her recent prenatal ultrasound findings. First trimester ultrasound performed earlier today at 12w6d gestation found an increased nuchal translucency of 4.4 mm. Concern was also noted for a possible cardiac defect, as some of the typical ultrasound views of the heart (four-chamber view, 3 vessel trachea) were not able to be obtained.    We reviewed with the patient that nuchal translucency (NT) is defined as a subcutaneous accumulation of fluid in the fetal neck. It may be recognized by ultrasound between the 10th and the 14th week of gestation as a sonolucent area in the region of the fetal neck. We discussed that there are several proposed mechanisms of an  increased NT, including cardiac failure secondary to abnormalities of the heart or great arteries, or altered composition of the subcutaneous tissue resulting from various fetal chromosome abnormalities, fetal infections, renal or other genitourinary abnormalities, or several other genetic syndromes such as single gene disorders (such as Sarah syndrome) or microdeletions. It is also possible that there is a healthy fetus with an isolated increased nuchal translucency. Prognosis depends on the underlying etiology. We discussed that given a measurement of 4.4 mm, the risk for a chromosome abnormality is approximately 21.1% and the chance for any congenital anomaly is approximately 10%. The chance of fetal miscarriage or death is approximately 2.7%. The chance of a healthy baby is approximately 70%. We discussed that if a cardiac defect is indeed present, this may somewhat increase the risk for other adverse outcomes, as they have a strong association with chromosomal abnormalities.    The risks, benefits, and limitations of amniocentesis were discussed with the patient. Amniocentesis is performed starting at 16 weeks gestation, using direct real time ultrasound visualization to avoid both the fetus and the placenta. Once amniotic fluid is withdrawn, laboratory analysis is performed and amniotic fluid alpha-fetoprotein, as well as chromosome and/or microarray analysis is undertaken. The risk of genetic amniocentesis includes, but is not limited to less than 1 in 300 pregnancy loss rate or  delivery rate if 23 weeks or greater, infection, bleeding, rupture of membranes, failure of cells to grow, karyotype error, laboratory error, etc. Occasionally a repeat amniocentesis is necessary due to cell culture failure. Chromosome/microarray analysis from amniocentesis is 99.9% accurate and alpha-fetoprotein analysis can detect approximately 95% of open neural tube defects. Chorionic villus sampling (CVS) is another  diagnostic testing option that is available earlier than amniocentesis, between 10-14 weeks gestation. Like amniocentesis, CVS is 99% accurate for detecting chromosomal problems. Unlike amniocentesis, CVS cannot detect alpha-fetoprotein levels in order to determine the risk for open neural tube defects. MSAFP testing would need to be performed at 15-20 weeks gestation for this purpose. The risk of CVS includes, but is not limited to, less than a 1 in 300 risk for pregnancy loss. There is also a 1% risk for maternal cell contamination and cell culture failure, in which case the CVS would need to be followed-up with amniocentesis. Molecular testing for single gene disorders can be performed, if appropriate, once a sample has been obtained.    We reviewed the testing option of cell free DNA screening (also known as noninvasive prenatal testing or NIPT), which can be done as early as 10 weeks gestation. We discussed that it is a serum test to identify fragments of placental DNA in maternal blood. We reviewed the benefits and limitations of cell free DNA screening in detecting Down syndrome, Trisomy 13, Trisomy 18 and sex chromosome aneuploidies. We reviewed the availability of expanded NIPT that can screen for other chromosomal abnormalities, such as microdeletions and rare autosomal aneuploidies. We discussed the benefits and limitations of expanded NIPT as compared to chromosomal microarray performed on a sample obtained via diagnostic testing. We briefly reviewed the availability of NIPT to screen for single gene disorders that are associated with abnormal ultrasound findings, such as Sarah syndrome.    We discussed the role that ultrasound imaging plays in determining the risk of fetal anomalies. We reviewed the availability of early anatomy ultrasound at approximately 16 weeks gestation for a preliminary survey and the opportunity to gather limited information about potential congenital anomalies. We reviewed that  level II anatomy ultrasound is typically performed at approximately 20 weeks gestation. Level II ultrasound evaluation is between 60-80% accurate in detecting major physical birth defects and variations in fetal development that may be associated with chromosome/genetic abnormalities. Level II ultrasound evaluation is not able to detect all birth defects or health problems. Due to the increased risk for cardiac defects, fetal echocardiogram may be performed at 22-24 weeks gestation.    Doreen is understandably concerned about the increased risk for adverse outcomes in her pregnancy. Emotional support was provided. She believes that she may not be satisfied with a low risk NIPT result, given the lower detection rate for chromosomal abnormalities and other conditions. She is also cognizant of the fact that she will soon be out of the gestational age range for CVS, and doesn't feel she can wait until 16 weeks to get more answers. For these reasons, she elects CVS. We discussed the plans to order fluorescence in situ hybridization (FISH) followed by a chromosomal microarray to detect both whole chromosome abnormalities and copy number variants. We reviewed the expected turnaround times for each test (3-5 days for FISH, ~2 weeks for microarray) as well as the possibility that the patient's sample would need to be cultured, which would add ~2 weeks to the wait time. We discussed that molecular testing for single gene disorders such as [place] syndrome is also available and can be pursued if desired. [facility] clinical coordinator will reach out to the patient for scheduling.     Due to the nature of the visit full histories for the patient and her partner's family were not taken during our session. Brandon reports a paternal first cousin once removed (father's sister's daughter's son), age 13, with mosaic trisomy 5. This individual is said to be doing well, with some occupational/motor delays but high intelligence and no known  physical health issues. No other family members are known to have a chromosomal abnormality. We discussed that trisomy 5 occurs sporadically in pregnancy, although it almost always ends in very early miscarriage; a mosaic fetus may progress to live birth, as was the case for this individual. We reviewed that this history is not expected to be related to Doreen's ultrasound findings in this pregnancy and should not increase the risk for chromosomal abnormalities in the couple's offspring. The family history was not significant for other genetic diseases or disorders, birth defects, fetal loss, or consanguinity.    Patient reports being of Malay descent and that her partner is of [place]/English/[place]/Mal[place]ian descent. She denies either of them having known Ashkenazi Latter-day ancestry. We reviewed the option of the ACOG standard of care carrier screening (cystic fibrosis, spinal muscular atrophy, and hemoglobinopathies) as well as expanded carrier screening, noting the latter's utility in potentially identifying a causative single gene disorder for the increased NT in this pregnancy. CF and SMA carrier screening have already been ordered in Epic. We will revisit the topic of expanded carrier screening in the near future.    Lastly, we discussed the fact that everyone in the general population regardless of age, family history, or medical background has approximately a 3-5% risk of having a child with some type of congenital anomaly, genetic disease or intellectual disability. Currently there are no tests available to rule out all birth defects or health problems.    Doreen was provided with our contact information. In the event that we need to reach Doreen, she prefers phone. I encouraged her to call with any questions or concerns.    Plan/Tests Ordered:  1) Patient declined NIPT at this time.  2) Patient elected [facility], to be scheduled by [facility] clinical coordinator. To order FISH, penitentiary studies, microarray, and hold  cells.  3) Early anatomy ultrasound scheduled on 10/28/2024 at [facility] site.  4) Level II anatomy ultrasound and fetal echocardiogram to be scheduled by [facility] staff at appropriate times.    Time spent with Genetic Counselor: 59 minutes         Visit Time  Total Visit Duration: 59 minutes

## 2024-10-03 PROBLEM — O28.3 INCREASED NUCHAL TRANSLUCENCY SPACE ON FETAL ULTRASOUND: Status: ACTIVE | Noted: 2024-10-03

## 2024-10-03 PROBLEM — Z86.32 HISTORY OF GESTATIONAL DIABETES IN PRIOR PREGNANCY, CURRENTLY PREGNANT IN FIRST TRIMESTER: Status: ACTIVE | Noted: 2024-10-03

## 2024-10-03 PROBLEM — O09.291 HISTORY OF GESTATIONAL DIABETES IN PRIOR PREGNANCY, CURRENTLY PREGNANT IN FIRST TRIMESTER: Status: ACTIVE | Noted: 2024-10-03

## 2024-10-03 PROBLEM — O09.521 MULTIGRAVIDA OF ADVANCED MATERNAL AGE IN FIRST TRIMESTER: Status: ACTIVE | Noted: 2024-10-03

## 2024-10-03 PROBLEM — O09.291 H/O SHOULDER DYSTOCIA IN PRIOR PREGNANCY, CURRENTLY PREGNANT, FIRST TRIMESTER: Status: ACTIVE | Noted: 2024-10-03

## 2024-10-03 NOTE — PROGRESS NOTES
OFFICE CONSULT  Referring physician:   Eve Lynne Md  [address]  [address]  [place] [address]      Dear Dr. Lynne      Thank you for requesting a  consultation on your patient Ms. Doreen Terrell for the following indications:  Genetic screening    History  Medications: Vitamin B12, folate, omega-3 capsule, prenatal vitamins, zinc, vitamin D  Allergies to medications: None  Past medical history: Advanced maternal age, history of gestational diabetes with her first pregnancy, history of a prior pregnancy with shoulder dystocia, history of anxiety currently requiring no medication, history of reflux  Past surgical history: [place] tooth extraction, LEEP  Past obstetrical history:   2018 9-week SAB  2019 at 39 weeks and 5 days she had an 8 pound 3 ounce baby girl vaginally.  That pregnancy was complicated by mild shoulder dystocia and gestational diabetes and postpartum hemorrhage  10/14/2021 at 40 weeks she has 7 pound 6 ounce baby girl without complications  Social history: She reports no substance use and is a former smoker  First generation family history: Hypertension in her father    Ultrasound findings:  The ultrasound shows a fetus concordant with dates. The nasal bone appears normal.  The nuchal translucency appears thickened at 4.4 mm.  We are also unable to clear the normal views on the heart at this gestational age increasing the suspicion of a possible heart defect. No other malformations are seen on today's early ultrasound.     The patient was informed of the findings and counseled about the limitations of the exam in detecting all forms of fetal congenital abnormalities.    She does not report any vaginal bleeding or uterine cramping or contractions.      Specific counseling was provided on the following problems:  We discussed the options for genetic screening which include invasive testing on the fetal placenta or on fetal skin cells within the amniotic fluid and  compared this to noninvasive testing which includes cell free DNA screening.  We reviewed the risks, the benefits and the limitations of each.  In the end patient chose to complete the cell free DNA screen.  To help aid her in this decision she was set up to speak with genetic counseling later today at 1 PM.  Nuchal thickening is associated with aneuploidy. The risk of aneuploidy is based on how thick this nuchal area is. One study showed the following risks in their population: 21% risk of aneuploidy found for a nuchal thickening of 3.5 to 4.4 mm  Approximately 50 percent of fetuses with increased nuchal translucency and an abnormal karyotype had trisomy 21; the rest had other chromosomal abnormalities. Most fetuses with trisomy 21 had a nuchal thickness less than 4.5 mm. By comparison, most fetuses with trisomy 13, trisomy 18, and Amaro syndrome had nuchal thickness =4.5 mm.  Cardiac defects are the most common malformation associated with increased nuchal translucency. NT measurements of 3.5mm or more can be associated with 23/1000 risk that a fetal cardiac defect will be found in the pregnancy.   If genetic screening returns as normal and if future ultrasounds show resolution of this nuchal translucency and no other evidence for a cardiac malformation or any other malformations by her 20-week scan than 98% of those pregnancies will be normal at birth.  With a history of a prior shoulder dystocia she is at increased risk for having a recurrence.  It is reassuring that s her last pregnancy was unaffected by shoulder dystocia.  Recommend a third trimester ultrasound for a better estimate of the baby's weight prior to delivery.  Recommend early screening for gestational diabetes and if normal recommend screening again at 28 weeks.      Future ultrasounds ordered today:   Fetal Level II ultrasound imaging is recommended 16 weeks for early anatomy.  After her visit with genetic counseling she is now set up for a [facility]  on 10/7/2024    Pre visit time reviewing her records   10 minutes  Face to face time 15 minutes  Post visit time on documentation of note, updating her problem list, adding orders and prescriptions 20 minutes.  Procedures that were completed today were charged separately.   The level of decision making was moderate complexity.    Mary Ann Rodriguez MD

## 2024-10-04 NOTE — PROGRESS NOTES
I was present in the office at the time of this appointment on 10/2/24 and was available for consult if needed. I have read this consult and agree with the information provided.     Mary Ann Rodriguez MD

## 2024-10-07 ENCOUNTER — PROCEDURE VISIT (OUTPATIENT)
Dept: PERINATAL CARE | Facility: OTHER | Age: 39
End: 2024-10-07
Payer: COMMERCIAL

## 2024-10-07 ENCOUNTER — APPOINTMENT (OUTPATIENT)
Dept: LAB | Facility: AMBULARY SURGERY CENTER | Age: 39
End: 2024-10-07
Payer: COMMERCIAL

## 2024-10-07 ENCOUNTER — CLINICAL SUPPORT (OUTPATIENT)
Dept: OBGYN CLINIC | Facility: CLINIC | Age: 39
End: 2024-10-07
Payer: COMMERCIAL

## 2024-10-07 VITALS
BODY MASS INDEX: 29.96 KG/M2 | WEIGHT: 162.8 LBS | DIASTOLIC BLOOD PRESSURE: 68 MMHG | HEIGHT: 62 IN | SYSTOLIC BLOOD PRESSURE: 108 MMHG

## 2024-10-07 VITALS
DIASTOLIC BLOOD PRESSURE: 70 MMHG | WEIGHT: 161 LBS | HEART RATE: 127 BPM | SYSTOLIC BLOOD PRESSURE: 134 MMHG | HEIGHT: 62 IN | BODY MASS INDEX: 29.63 KG/M2

## 2024-10-07 DIAGNOSIS — R10.13 EPIGASTRIC ABDOMINAL PAIN: ICD-10-CM

## 2024-10-07 DIAGNOSIS — Z36.9 ENCOUNTER FOR ANTENATAL SCREENING OF MOTHER: ICD-10-CM

## 2024-10-07 DIAGNOSIS — O26.899 RH NEGATIVE STATE IN ANTEPARTUM PERIOD: ICD-10-CM

## 2024-10-07 DIAGNOSIS — Z3A.13 13 WEEKS GESTATION OF PREGNANCY: ICD-10-CM

## 2024-10-07 DIAGNOSIS — O28.3 INCREASED NUCHAL TRANSLUCENCY SPACE ON FETAL ULTRASOUND: ICD-10-CM

## 2024-10-07 DIAGNOSIS — Z3A.13 13 WEEKS GESTATION OF PREGNANCY: Primary | ICD-10-CM

## 2024-10-07 DIAGNOSIS — O26.899 RH NEGATIVE STATE IN ANTEPARTUM PERIOD: Primary | ICD-10-CM

## 2024-10-07 DIAGNOSIS — O26.892 RH NEGATIVE STATUS DURING PREGNANCY IN SECOND TRIMESTER: Primary | ICD-10-CM

## 2024-10-07 DIAGNOSIS — Z34.81 PRENATAL CARE, SUBSEQUENT PREGNANCY, FIRST TRIMESTER: ICD-10-CM

## 2024-10-07 DIAGNOSIS — Z31.430 ENCOUNTER OF FEMALE FOR TESTING FOR GENETIC DISEASE CARRIER STATUS FOR PROCREATIVE MANAGEMENT: ICD-10-CM

## 2024-10-07 DIAGNOSIS — Z67.91 RH NEGATIVE STATUS DURING PREGNANCY IN SECOND TRIMESTER: Primary | ICD-10-CM

## 2024-10-07 DIAGNOSIS — Z67.91 RH NEGATIVE STATE IN ANTEPARTUM PERIOD: ICD-10-CM

## 2024-10-07 DIAGNOSIS — Z67.91 RH NEGATIVE STATE IN ANTEPARTUM PERIOD: Primary | ICD-10-CM

## 2024-10-07 DIAGNOSIS — Z86.32 HISTORY OF GESTATIONAL DIABETES: ICD-10-CM

## 2024-10-07 DIAGNOSIS — Z36.0 ENCOUNTER FOR CVS (CHORIONIC VILLUS SAMPLING): ICD-10-CM

## 2024-10-07 LAB
ABO GROUP BLD: NORMAL
BACTERIA UR QL AUTO: NORMAL /HPF
BASOPHILS # BLD AUTO: 0.04 THOUSANDS/ΜL (ref 0–0.1)
BASOPHILS NFR BLD AUTO: 0 % (ref 0–1)
BILIRUB UR QL STRIP: NEGATIVE
BLD GP AB SCN SERPL QL: NEGATIVE
CLARITY UR: CLEAR
COLOR UR: COLORLESS
EOSINOPHIL # BLD AUTO: 0.18 THOUSAND/ΜL (ref 0–0.61)
EOSINOPHIL NFR BLD AUTO: 2 % (ref 0–6)
ERYTHROCYTE [DISTWIDTH] IN BLOOD BY AUTOMATED COUNT: 12 % (ref 11.6–15.1)
GLUCOSE UR STRIP-MCNC: NEGATIVE MG/DL
HCT VFR BLD AUTO: 36.8 % (ref 34.8–46.1)
HGB BLD-MCNC: 12.3 G/DL (ref 11.5–15.4)
HGB UR QL STRIP.AUTO: NEGATIVE
IGA SERPL-MCNC: 307 MG/DL (ref 66–433)
IMM GRANULOCYTES # BLD AUTO: 0.04 THOUSAND/UL (ref 0–0.2)
IMM GRANULOCYTES NFR BLD AUTO: 0 % (ref 0–2)
KETONES UR STRIP-MCNC: NEGATIVE MG/DL
LEUKOCYTE ESTERASE UR QL STRIP: ABNORMAL
LYMPHOCYTES # BLD AUTO: 2.31 THOUSANDS/ΜL (ref 0.6–4.47)
LYMPHOCYTES NFR BLD AUTO: 24 % (ref 14–44)
MCH RBC QN AUTO: 31.9 PG (ref 26.8–34.3)
MCHC RBC AUTO-ENTMCNC: 33.4 G/DL (ref 31.4–37.4)
MCV RBC AUTO: 96 FL (ref 82–98)
MONOCYTES # BLD AUTO: 0.5 THOUSAND/ΜL (ref 0.17–1.22)
MONOCYTES NFR BLD AUTO: 5 % (ref 4–12)
NEUTROPHILS # BLD AUTO: 6.42 THOUSANDS/ΜL (ref 1.85–7.62)
NEUTS SEG NFR BLD AUTO: 69 % (ref 43–75)
NITRITE UR QL STRIP: NEGATIVE
NON-SQ EPI CELLS URNS QL MICRO: NORMAL /HPF
NRBC BLD AUTO-RTO: 0 /100 WBCS
PH UR STRIP.AUTO: 6 [PH]
PLATELET # BLD AUTO: 325 THOUSANDS/UL (ref 149–390)
PMV BLD AUTO: 10.3 FL (ref 8.9–12.7)
PROT UR STRIP-MCNC: NEGATIVE MG/DL
RBC # BLD AUTO: 3.85 MILLION/UL (ref 3.81–5.12)
RBC #/AREA URNS AUTO: NORMAL /HPF
RH BLD: NEGATIVE
RUBV IGG SERPL IA-ACNC: 19.2 IU/ML
SP GR UR STRIP.AUTO: 1.01 (ref 1–1.03)
SPECIMEN EXPIRATION DATE: NORMAL
UROBILINOGEN UR STRIP-ACNC: <2 MG/DL
WBC # BLD AUTO: 9.49 THOUSAND/UL (ref 4.31–10.16)
WBC #/AREA URNS AUTO: NORMAL /HPF

## 2024-10-07 PROCEDURE — 96372 THER/PROPH/DIAG INJ SC/IM: CPT | Performed by: OBSTETRICS & GYNECOLOGY

## 2024-10-07 PROCEDURE — 59015 CHORION BIOPSY: CPT | Performed by: OBSTETRICS & GYNECOLOGY

## 2024-10-07 PROCEDURE — 87340 HEPATITIS B SURFACE AG IA: CPT

## 2024-10-07 PROCEDURE — 76945 ECHO GUIDE VILLUS SAMPLING: CPT | Performed by: OBSTETRICS & GYNECOLOGY

## 2024-10-07 PROCEDURE — 82784 ASSAY IGA/IGD/IGG/IGM EACH: CPT

## 2024-10-07 PROCEDURE — 76815 OB US LIMITED FETUS(S): CPT | Performed by: OBSTETRICS & GYNECOLOGY

## 2024-10-07 PROCEDURE — 86803 HEPATITIS C AB TEST: CPT

## 2024-10-07 PROCEDURE — 86780 TREPONEMA PALLIDUM: CPT

## 2024-10-07 PROCEDURE — 86850 RBC ANTIBODY SCREEN: CPT

## 2024-10-07 PROCEDURE — 86901 BLOOD TYPING SEROLOGIC RH(D): CPT

## 2024-10-07 PROCEDURE — 87389 HIV-1 AG W/HIV-1&-2 AB AG IA: CPT

## 2024-10-07 PROCEDURE — 86258 DGP ANTIBODY EACH IG CLASS: CPT

## 2024-10-07 PROCEDURE — 81220 CFTR GENE COM VARIANTS: CPT

## 2024-10-07 PROCEDURE — 99213 OFFICE O/P EST LOW 20 MIN: CPT | Performed by: OBSTETRICS & GYNECOLOGY

## 2024-10-07 PROCEDURE — 86706 HEP B SURFACE ANTIBODY: CPT

## 2024-10-07 PROCEDURE — 36415 COLL VENOUS BLD VENIPUNCTURE: CPT

## 2024-10-07 PROCEDURE — 81329 SMN1 GENE DOS/DELETION ALYS: CPT

## 2024-10-07 PROCEDURE — 86762 RUBELLA ANTIBODY: CPT

## 2024-10-07 PROCEDURE — 86900 BLOOD TYPING SEROLOGIC ABO: CPT

## 2024-10-07 PROCEDURE — 86364 TISS TRNSGLTMNASE EA IG CLAS: CPT

## 2024-10-07 PROCEDURE — 81001 URINALYSIS AUTO W/SCOPE: CPT

## 2024-10-07 PROCEDURE — 85025 COMPLETE CBC W/AUTO DIFF WBC: CPT

## 2024-10-07 PROCEDURE — 87086 URINE CULTURE/COLONY COUNT: CPT

## 2024-10-07 NOTE — PROGRESS NOTES
PREPROCEDURE H&P: MATERNAL-FETAL MEDICINE    Doreen Terrell is a 38 y.o. year-old para  at 13w4d here with chief complaint of preprocedure History and Physical Examination prior to planned office CVS (chorionic villus sampling).  History of Present Illness as it relates to this planned procedure is abnormal ultrasound.  Her history is as follows:    Past Medical History:   Diagnosis Date    Abnormal Pap smear of cervix     HPV,leep    AMA (advanced maternal age) multigravida 35+, third trimester     Back pain     GERD (gastroesophageal reflux disease)     none presantly    Gestational diabetes w/ 1st pregnancy    Helicobacter pylori (H. pylori) infection cleared after treatment    History of postpartum hemorrhage, currently pregnant     History of shoulder dystocia in prior pregnancy, currently pregnant in third trimester     Varicella     as child     Past Surgical History:   Procedure Laterality Date    CERVICAL BIOPSY  W/ LOOP ELECTRODE EXCISION          COLONOSCOPY      VT ESOPHAGOGASTRODUODENOSCOPY TRANSORAL DIAGNOSTIC N/A 2016    Procedure: ESOPHAGOGASTRODUODENOSCOPY (EGD);  Surgeon: Thomas Lopez MD;  Location: [facility];  Service: Gastroenterology    WISDOM TOOTH EXTRACTION Bilateral      OB History    Para Term  AB Living   4 2 2 0 1 2   SAB IAB Ectopic Multiple Live Births   1 0 0 0 2      # Outcome Date GA Lbr Josue/2nd Weight Sex Type Anes PTL Lv   4 Current            3 Term 10/14/21 40w0d / 00:19 3345 g (7 lb 6 oz) F Vag-Spont EPI N FAHAD   2 Term 19 39w5d / 02:14 3714 g (8 lb 3 oz) F Vag-Spont EPI N FAHAD      Complications: Shoulder Dystocia, GDM (gestational diabetes mellitus), Postpartum hemorrhage   1 SAB  9w0d            Social History     Tobacco Use   Smoking Status Former    Current packs/day: 0.00    Average packs/day: 0.3 packs/day for 10.0 years (2.5 ttl pk-yrs)    Types: Cigarettes    Start date: 2009    Quit date: 2017    Years since  quittin.7   Smokeless Tobacco Never   Tobacco Comments    college      Social History     Substance and Sexual Activity   Alcohol Use Not Currently    Alcohol/week: 1.0 standard drink of alcohol    Types: 1 Glasses of wine per week    Comment: social      Social History     Substance and Sexual Activity   Drug Use Never        Current Outpatient Medications:     Cyanocobalamin (Vitamin B12 TR) 2000 MCG TBCR, Take 1 Dose by mouth once a week, Disp: , Rfl:     famotidine (PEPCID) 40 MG tablet, Take 1 tablet (40 mg total) by mouth 2 (two) times a day (Patient not taking: Reported on 2024), Disp: 60 tablet, Rfl: 0    folic acid (FOLVITE) 800 MCG tablet, , Disp: , Rfl:     Kelp 0.15 MG TABS, , Disp: , Rfl:     omega-3-acid ethyl esters (LOVAZA) 1 g capsule, Take 500 mg by mouth daily OMEGA +  DHA= 500MG  - KELT, Disp: , Rfl:     Prenatal Vit-Fe Fumarate-FA (PRENATAL PO), Take by mouth, Disp: , Rfl:     VITAMIN D, CHOLECALCIFEROL, PO, Take 2,000 Units by mouth daily, Disp: , Rfl:     Zinc 50 MG TABS, , Disp: , Rfl:   No Known Allergies  Review of Systems    Examination:  Vitals:    10/07/24 1008   BP: 134/70   Pulse: (!) 127       Physical Exam  General appearance: alert, well appearing, and in no distress.  Head: HEENT  Pulmonary exam: Non labored.   Abdominal exam: soft, nontender, nondistended, no masses or organomegaly.   Extremity exam:grossly normal    Relevant lab data:  Blood type: O   Rh type: NEGATIVE        Assessment and Plan: Ms. Terrell is a 38 y.o. year-old para  here for preoperative History and Physical Examination prior to planned office CVS (chorionic villus sampling).  Risks, benefits and alternatives to this procedure were reviewed with the patient.  Risks specific to the procedure (beyond the standard [facility] procedure consent) that I reviewed with the patient prior to the procedure include: CVS: vaginal bleeding, damage to internal organs requiring repair, infection requiring  "delivery, failure to achieve desired results.  Today there are no obvious contraindications to planned procedure.  Signed informed consent was obtained.  Please see documentation of procedure in ultrasound report, to be found under \"OB procedures\" tab in Epic.      At the conclusion of today's encounter, all questions were answered to her satisfaction.  Thank you very much for this kind referral and please do not hesitate to contact me with any further questions or concerns.    Sincerely,    Ursula Bynum MD  Attending Physician, Maternal-Fetal Medicine  [facility]        "

## 2024-10-07 NOTE — LETTER
"2024    Pippa Sanchez PA-C  [address]  [address]  [place] [address]    Patient: Doreen Terrell   YOB: 1985   Date of Visit: 10/7/2024   Gestational age 13w4d   Nature of this communication: Routine though please note patient underwent uncomplicated transabdominal CVS today for finding of thickened nuchal translucency. She is scheduled for Rhogam administration in your office at 2 pm today       Dear colleagues,    This patient was seen recently in our  office.  The content of my evaluation today is in the ultrasound report under \"OB Procedures\" tab.     Please don't hesitate to contact our office with any concerns or questions.     Sincerely,      Ursula Bynum MD  Attending Physician, Maternal-Fetal Medicine  [facility]      "

## 2024-10-07 NOTE — PROGRESS NOTES
CVS pre-procedure timeout performed @ 10:44.  Verified patient name and .  Confirmed procedure to be performed.   Reviewed Maternal Blood type  O  Negative   Rhogam needed: yes Patient has an appointment today at 2 pm for Rhogam  Reviewed relevant allergies .   No Known Allergies  Reviewed Maternal medication list.     Specimen labeled with patient name/ /specimen collection date.  Validated correct patient identification on procedure signed consent and specimen label.    Patient tolerated procedure without difficulty.  Verbally reviewed with patient post amniocentesis instructions including warning symptoms to report ( bleeding. cramping, leaking, fever).  Physician contact information given.  Patient was able to verbally teach back instructions.    CVS  post procedure with Dr. Bynum.   Physician reviewed specimen labeling.  Labcorplink orders placed for FISH #[id number], Microarray, and Maternal Cell Contamination.

## 2024-10-08 LAB
HBV SURFACE AB SER-ACNC: 78.3 MIU/ML
HBV SURFACE AG SER QL: NORMAL
HCV AB SER QL: NORMAL
HIV 1+2 AB+HIV1 P24 AG SERPL QL IA: NORMAL
HIV 2 AB SERPL QL IA: NORMAL
HIV1 AB SERPL QL IA: NORMAL
HIV1 P24 AG SERPL QL IA: NORMAL
TREPONEMA PALLIDUM IGG+IGM AB [PRESENCE] IN SERUM OR PLASMA BY IMMUNOASSAY: NORMAL

## 2024-10-09 LAB
BACTERIA UR CULT: ABNORMAL
GLIADIN PEPTIDE IGA SER-ACNC: 0.4 U/ML
GLIADIN PEPTIDE IGA SER-ACNC: NEGATIVE
GLIADIN PEPTIDE IGG SER-ACNC: <0.4 U/ML
GLIADIN PEPTIDE IGG SER-ACNC: NEGATIVE
TTG IGA SER-ACNC: <0.5 U/ML
TTG IGA SER-ACNC: NEGATIVE
TTG IGG SER-ACNC: <0.8 U/ML
TTG IGG SER-ACNC: NEGATIVE

## 2024-10-10 ENCOUNTER — TELEPHONE (OUTPATIENT)
Age: 39
End: 2024-10-10

## 2024-10-10 NOTE — TELEPHONE ENCOUNTER
Lynn calling from [facility], delay on FISH results , due to the small sample size, hoping to have results for tomorrow 10/11/24.

## 2024-10-11 ENCOUNTER — TELEPHONE (OUTPATIENT)
Age: 39
End: 2024-10-11

## 2024-10-11 ENCOUNTER — TELEPHONE (OUTPATIENT)
Facility: HOSPITAL | Age: 39
End: 2024-10-11

## 2024-10-11 LAB
CITATION REF LAB TEST: NORMAL
CLINICAL INFO: NORMAL
ETHNIC BACKGROUND STATED: NORMAL
GENE DIS ANL CARRIER INTERP-IMP: NORMAL
GENE MUT TESTED BLD/T: NORMAL
LAB DIRECTOR NAME PROVIDER: NORMAL
REASON FOR REFERRAL (NARRATIVE): NORMAL
RECOMMENDATION PATIENT DOC-IMP: NORMAL
REF LAB TEST METHOD: NORMAL
SERVICE CMNT-IMP: NORMAL
SMN1 GENE MUT ANL BLD/T: NORMAL
SPECIMEN SOURCE: NORMAL

## 2024-10-11 NOTE — TELEPHONE ENCOUNTER
Lynn from [facility] calling to report abnormal FISH test result indicating positive Trisomy 21. Ensuring result has been received by office. RN advised yes, office staff aware. No further questions.

## 2024-10-11 NOTE — TELEPHONE ENCOUNTER
Doreen called me back and left a second message; I called back and was able to speak with her. We discussed that the FISH result from her recent CVS procedure shows three copies of trisomy 21, consistent with Down syndrome.     Doreen was understandably surprised by the results. We discussed that the chromosomal microarray is still pending, although I do not expect it will produce a different result. We reviewed that the observed phenotype (increased nuchal translucency, possible cardiac defect) is also consistent with Down syndrome, so the most likely outcome is that the result is accurate and reflects the fetal karyotype. We discussed that I will also order chromosome analysis on the sample so we can see the full karyotype, but again I do not think it likely that we will see a different result.     I offered to schedule Doreen for a follow-up genetic counseling visit next week to further discuss this test result and the implications; she stated that this weekend she is going with her family to [place] and will be gone all next week. We agreed that she will reach out to me if she thinks of additional questions over the weekend. I reassured her that I am flexible and happy to do whatever works best for her; if she would prefer to focus on her family and touch base after she comes back, that is perfectly fine. Alternately, we can schedule another phone call or virtual visit. If additional genetic test results come in while she is on vacation Doreen would like me to call her.     We discussed that we will learn additional information about the fetal phenotype as the pregnancy progresses; for example, the scheduled early anatomy ultrasound on 10/28/2024 should tell us more about the fetal heart (as will a 20 week ultrasound and fetal echocardiogram). We reviewed that many pregnancies with Down syndrome have a heart defect, but there is a large range of severity. Some children may require immediate treatment,  while others may be able to defer it or wait for the defect to resolve on its own. We discussed that the clinical features of Down syndrome vary, and there are many aspects of the future phenotype that we cannot predict in the prenatal period.     We discussed that pregnancies with Down syndrome unfortunately have an increased risk for miscarriage or stillbirth. We reviewed that patients whose pregnancy has Down syndrome typically have fetal monitoring during the third trimester to reduce the risk for stillbirth. We discussed that the ultrasound performed at Doreen's CVS procedure on Monday seemed to show a decreased NT measurement compared to her ultrasound from last Wednesday; while this does not change the genetic test result, it somewhat reduces our concern for imminent miscarriage compared to a potential scenario where the NT measurement had continued to increase and progressed to hydrops fetalis.    Doreen did not have any further questions at this time. She plans to take the next few days to let the information sink in and discuss things with her family. She will reach out to me next week if she would like to check in, and I will call her when I have more results.    Joyce Owens, CGC

## 2024-10-11 NOTE — TELEPHONE ENCOUNTER
Doreen called and left a me a message asking if the FISH results from her CVS procedure on Monday 10/7/2024 had come in. I called her back at [phone] and left a message with my callback number. I will speak with her as soon as we connect.     Joyce Owens, CGC

## 2024-10-14 ENCOUNTER — TELEPHONE (OUTPATIENT)
Facility: HOSPITAL | Age: 39
End: 2024-10-14

## 2024-10-14 NOTE — TELEPHONE ENCOUNTER
I called [facility] genetics customer service and spoke with Idalmis about adding on test code [id number] (chromosome analysis) to the CVS sample we sent in last week for Doreen. Idalmis will speak with lab personnel and get back to me with their response. Provided my name and callback number.    Joyce Owens, [facility]

## 2024-10-15 NOTE — TELEPHONE ENCOUNTER
After receiving no response yesterday, I called [facility] genetics customer service again today and spoke with Emi about adding on test code [id number] (chromosome analysis) to Doreen's CVS sample. Emi stated they had not heard back from the lab since my request yesterday, but she will ask again today. They have my contact information and will call with any updates.    Joyce Owens, CGC

## 2024-10-23 ENCOUNTER — TELEPHONE (OUTPATIENT)
Age: 39
End: 2024-10-23

## 2024-10-23 ENCOUNTER — TELEPHONE (OUTPATIENT)
Facility: HOSPITAL | Age: 39
End: 2024-10-23

## 2024-10-23 NOTE — TELEPHONE ENCOUNTER
I called Doreen and left a message asking how she had been since we last spoke. I stated that we have received some more genetic testing results that I would be happy to discuss with her whenever she is free. I will speak with Doreen when she calls back, or try to reach her again at the end of the day if she has not done so.    Joyce Owens, CGC

## 2024-10-23 NOTE — TELEPHONE ENCOUNTER
Lynn from [facility] called with significant finding on genetic screening, abnormal Trisomy 21.    She reports they electronically forwarded results to Dr. Bynum.  Result is currently not visible in epic. Requested she fax result to [phone]    Attempted to contact [facility]/clinical-no answer  Attempted to contact [facility]/clerical-no answer-    HP IB message sent to Joyce Owens,MOISE and [facility] danni clinical staff to monitor for result    Lynn/noam can be reached at [phone](direct line)

## 2024-10-23 NOTE — TELEPHONE ENCOUNTER
Doreen called me back this afternoon. She stated she had a good vacation in [place] but then unfortunately she and her family all got COVID, so she doesn't feel well right now. We discussed that the chromosome analysis from her CVS procedure has resulted and confirmed the presence of three copies of chromosome 21 in all tested cells, consistent with trisomy 21, or Down syndrome.     Doreen asked about what conditions this test could detect. We discussed that chromosome analysis (karyotype) can detect whole chromosome aneuploidy but may miss a smaller chromosomal deletion or duplication; the pending chromosomal microarray would detect this better. We discussed that a smaller copy number variant is possible in any pregnancy, including this one, but as the features visualized on first trimester ultrasound are all consistent with trisomy 21, there is no particular reason to expect a dual diagnosis.     I offered Doreen a genetic counseling visit to talk about this result and her next steps moving forward. We reviewed that her early anatomy ultrasound is scheduled this coming Monday on 10/28/2024 at the [facility] office. Doreen prefers to wait until she has this ultrasound to learn more about the baby's health before having another visit. She will reach out to me after her ultrasound next week.     Doreen shared that she and her  are most concerned about the physical health of the baby, and are particularly concerned about the prospect of a heart defect or other serious medical complications. We reviewed that approximately half of all babies with trisomy 21 have a heart defect, but there is a wide range of severity. We discussed that most congenital anomalies, including heart defects, are easier visualized at later gestational ages; if nothing of concern is noted on Doreen's upcoming early anatomy ultrasound, it is reassuring but does not rule out the possibility of a cardiac defect being discovered  later.     Doreen verbalized her understanding and had no further questions at this time. We will touch base again after her appointment next week.    Joyce Owens CGC    A short time after this call, I realized that I had not disclosed the fetal sex, which is known because of the FISH and chromosome results. I left another voicemail for Doreen asking her to call me back if she wanted to know. She called, I confirmed her desire to know the sex, and shared that it was female (47,XX+21 karyotype).     Joyce Owens CGC

## 2024-10-25 ENCOUNTER — TELEPHONE (OUTPATIENT)
Dept: OBGYN CLINIC | Facility: CLINIC | Age: 39
End: 2024-10-25

## 2024-10-25 NOTE — TELEPHONE ENCOUNTER
Pt needs to have appointment with a physician next week. LMOM for Pt call me back.   Please transfer to [facility] in the office. Thank you!

## 2024-10-28 ENCOUNTER — ROUTINE PRENATAL (OUTPATIENT)
Facility: HOSPITAL | Age: 39
End: 2024-10-28
Payer: COMMERCIAL

## 2024-10-28 VITALS
WEIGHT: 160.72 LBS | HEIGHT: 62 IN | HEART RATE: 99 BPM | SYSTOLIC BLOOD PRESSURE: 114 MMHG | BODY MASS INDEX: 29.58 KG/M2 | DIASTOLIC BLOOD PRESSURE: 68 MMHG

## 2024-10-28 DIAGNOSIS — O35.13X0 TRISOMY 21 OF FETUS, CURRENT PREGNANCY, SINGLE GESTATION: ICD-10-CM

## 2024-10-28 DIAGNOSIS — Z3A.16 16 WEEKS GESTATION OF PREGNANCY: Primary | ICD-10-CM

## 2024-10-28 DIAGNOSIS — O35.BXX0: ICD-10-CM

## 2024-10-28 PROCEDURE — 99214 OFFICE O/P EST MOD 30 MIN: CPT | Performed by: OBSTETRICS & GYNECOLOGY

## 2024-10-28 PROCEDURE — 76811 OB US DETAILED SNGL FETUS: CPT | Performed by: OBSTETRICS & GYNECOLOGY

## 2024-10-28 NOTE — PROGRESS NOTES
Doreen presents today for an early anatomic evaluation.  Chorionic villous sampling confirmed trisomy 21.    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  The exact heart defect cannot be elucidated based on today's imaging.  I reviewed today's findings with the patient and her  and answered all of their questions to apparent satisfaction.  We discussed evaluation by pediatric cardiology and I have arranged for an echocardiogram tomorrow at noon with Dr. Mark Magill.    Thank you very much for allowing us to participate in the care of this very nice patient.  Should you have any questions, please do not hesitate to contact our office.

## 2024-10-29 ENCOUNTER — ROUTINE PRENATAL (OUTPATIENT)
Age: 39
End: 2024-10-29
Payer: COMMERCIAL

## 2024-10-29 DIAGNOSIS — O35.BXX1 FETAL CARDIAC DISEASE AFFECTING PREGNANCY, FETUS 1: Primary | ICD-10-CM

## 2024-10-29 PROCEDURE — 99205 OFFICE O/P NEW HI 60 MIN: CPT | Performed by: PEDIATRICS

## 2024-10-29 NOTE — PROGRESS NOTES
Fetal Cardiology Consult    Date of Visit:             10/29/2024  Gestational Age:           16w5d   Estimated Date of Delivery: 4/10/25   Referring provider:                No ref. provider found     Reason for consultation: Recent diagnosis of trisomy 21 and congenital heart disease    Fetal Echocardiogram was limited given 16-week 5-day gestation.  There is a large ventricular septal defect with 1 large great vessel -likely the pulmonary artery given the branch PAs seen-and unobstructed arch.  There is no retrograde flow appreciated into a second great vessel seen.  There is no significant size discrepancy between the ventricles.  There is normal appearance and motion of the valves with normal biventricular systolic function.      I reviewed the observed anatomy and we discussed the need for delivery at a pediatric CT surgery center, likely need for prostaglandins, and  surgery.  We discussed the chronic nature of cardiac follow-up and patient's where a secondary outflow track is needed to be surgically created.  We discussed that this fetus has 2 equal ventricles and does not need to go down the single ventricle palliation however there would be more follow-up and possible interventions then trisomy 21 babies with an AV canal or VSD repair.  We also discussed about the limitations of today's study and knowing the definitive anatomy.     Assessment and Recommendations:  -16-week fetus with trisomy 21 with normal ventricular size and function.  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    -We discussed the need for delivery at pediatric CT surgery center-either [facility] or .  Family is contemplating continuation of pregnancy and, based on their decision, will reach out to their OB team or MFM team to schedule a formal fetal consultation at either surgical center at 20 weeks gestation.  I am happy to see the family  again in any capacity.      Manjinder Zuniga MD  Pediatric Cardiology  [facility]  Phone:[phone]  Fax: [phone]  [email or web address]    I spent 75minutes - on the day of service - reviewing the patient's chart, reviewing previous imaging studies, counseling the patient about the fetal cardiac anatomy and plan, coordinating care, and documenting care.

## 2024-10-30 ENCOUNTER — TELEPHONE (OUTPATIENT)
Age: 39
End: 2024-10-30

## 2024-10-30 ENCOUNTER — TELEPHONE (OUTPATIENT)
Dept: OBGYN CLINIC | Facility: CLINIC | Age: 39
End: 2024-10-30

## 2024-10-30 ENCOUNTER — INITIAL PRENATAL (OUTPATIENT)
Dept: OBGYN CLINIC | Facility: CLINIC | Age: 39
End: 2024-10-30

## 2024-10-30 VITALS — WEIGHT: 164 LBS | BODY MASS INDEX: 30 KG/M2 | DIASTOLIC BLOOD PRESSURE: 80 MMHG | SYSTOLIC BLOOD PRESSURE: 126 MMHG

## 2024-10-30 DIAGNOSIS — Z3A.16 16 WEEKS GESTATION OF PREGNANCY: Primary | ICD-10-CM

## 2024-10-30 NOTE — H&P (VIEW-ONLY)
Assessment Doreen was seen today for routine prenatal visit.    Diagnoses and all orders for this visit:    16 weeks gestation of pregnancy         Plan    Doreen Terrell is being scheduled for exam under anesthesia and dilation and evacuation. Pre-op instructions discussed with patient.     We reviewed the risks, benefits, and alternatives today including bleeding, infection, pain, damage to surrounding structures, uterine perforation, need for hysterectomy. She is aware that the procedure will be performed under ultrasound guidance. Informed consent was obtained for EUA, D&E. We discussed code status - full code.  Blood transfusion is acceptable. PA ROXANNA form was reviewed and signed. Patient is unsure of disposition of remains, will re-address prior to surgery. Further genetic testing is not indicated. We reviewed need for laminaria placement day prior to procedure. She is aware of the scheduling process and will await call. All questions were answered to her satisfaction.       Subjective  Chief Complaint   Patient presents with    Routine Prenatal Visit          Doreen Terrell is a 39 y.o.  female  here for her first prenatal visit. She recently had a CVS done which showed Trisomy 21. Additionally she had a fetal echocardiogram yesterday that showed cardiac anomalies. The patient is understandably upset today and has been discussing her options with her . She wishes to proceed with termination at this time.      Patient Active Problem List   Diagnosis    Hyperlipidemia    Anxiety    GERD without esophagitis    Chronic right-sided thoracic back pain    Overweight    Mid back pain, chronic    Increased nuchal translucency space on fetal ultrasound    Multigravida of advanced maternal age in first trimester    H/O shoulder dystocia in prior pregnancy, currently pregnant, first trimester    History of gestational diabetes in prior pregnancy, currently pregnant in first trimester    Trisomy 21 of  fetus, current pregnancy, single gestation    Fetal hypoplastic left heart affecting antepartum care of mother, single gestation    16 weeks gestation of pregnancy       Obstetric History  OB History    Para Term  AB Living   4 2 2 0 1 2   SAB IAB Ectopic Multiple Live Births   1 0 0 0 2      # Outcome Date GA Lbr Josue/2nd Weight Sex Type Anes PTL Lv   4 Current            3 Term 10/14/21 40w0d / 00:19 3345 g (7 lb 6 oz) F Vag-Spont EPI N FAHAD   2 Term 19 39w5d / 02:14 3714 g (8 lb 3 oz) F Vag-Spont EPI N FAHAD      Complications: Shoulder Dystocia, GDM (gestational diabetes mellitus), Postpartum hemorrhage   1 2018 9w0d              Past Medical/Surgical/Family/Social History    Past Medical History:   Diagnosis Date    Abnormal Pap smear of cervix     HPV,leep    AMA (advanced maternal age) multigravida 35+, third trimester     Back pain     GERD (gastroesophageal reflux disease)     none presantly    Gestational diabetes w/ 1st pregnancy    Helicobacter pylori (H. pylori) infection cleared after treatment    History of postpartum hemorrhage, currently pregnant     History of shoulder dystocia in prior pregnancy, currently pregnant in third trimester     Varicella     as child     Past Surgical History:   Procedure Laterality Date    CERVICAL BIOPSY  W/ LOOP ELECTRODE EXCISION          COLONOSCOPY      PA ESOPHAGOGASTRODUODENOSCOPY TRANSORAL DIAGNOSTIC N/A 2016    Procedure: ESOPHAGOGASTRODUODENOSCOPY (EGD);  Surgeon: Thomas Lopez MD;  Location: [facility];  Service: Gastroenterology    WISDOM TOOTH EXTRACTION Bilateral      Family History   Problem Relation Age of Onset    Hyperlipidemia Mother     Depression Father     Hyperlipidemia Father     Depression Brother     Hypertension Brother     No Known Problems Daughter     No Known Problems Daughter     Frontotemporal dementia Maternal Grandmother     Other Maternal Grandmother         pre diabetes  Diet control     "Cancer Maternal Grandfather         bile duct cancer/stomach ca    Parkinsonism Paternal Grandmother     Other Paternal Grandmother         pre-diabetic    Heart disease Paternal Grandfather         MI    Depression Paternal Grandfather     Heart attack Paternal Grandfather 50 - 59         in his 50\"s    Mental illness Family         anxiety all the males     Social History     Socioeconomic History    Marital status: /Civil Union     Spouse name: Brandon    Number of children: 2    Years of education: Not on file    Highest education level: Associate degree: academic program   Occupational History     Comment: Works at the [employer]   Tobacco Use    Smoking status: Former     Current packs/day: 0.00     Average packs/day: 0.3 packs/day for 10.0 years (2.5 ttl pk-yrs)     Types: Cigarettes     Start date: 2009     Quit date: 2017     Years since quittin.8    Smokeless tobacco: Never    Tobacco comments:     college   Vaping Use    Vaping status: Never Used   Substance and Sexual Activity    Alcohol use: Not Currently     Alcohol/week: 1.0 standard drink of alcohol     Comment: social    Drug use: No    Sexual activity: Yes     Partners: Male     Birth control/protection: Condom Male     Comment: Brandon   Other Topics Concern    Not on file   Social History Narrative    Not on file     Social Determinants of Health     Financial Resource Strain: Not on file   Food Insecurity: No Food Insecurity (10/1/2024)    Nursing - Inadequate Food Risk Classification     Worried About Running Out of Food in the Last Year: Never true     Ran Out of Food in the Last Year: Never true     Ran Out of Food in the Last Year: Not on file   Transportation Needs: No Transportation Needs (10/1/2024)    PRAPARE - Transportation     Lack of Transportation (Medical): No     Lack of Transportation (Non-Medical): No   Physical Activity: Not on file   Stress: Not on file   Social Connections: Not on file   Intimate Partner " Violence: Not on file   Housing Stability: Low Risk  (10/1/2024)    Housing Stability Vital Sign     Unable to Pay for Housing in the Last Year: No     Number of Times Moved in the Last Year: 0     Homeless in the Last Year: No         Patient has no known allergies.    Current Outpatient Medications:     Cyanocobalamin (Vitamin B12 TR) 2000 MCG TBCR, Take 1 Dose by mouth once a week, Disp: , Rfl:     folic acid (FOLVITE) 800 MCG tablet, , Disp: , Rfl:     Kelp 0.15 MG TABS, , Disp: , Rfl:     omega-3-acid ethyl esters (LOVAZA) 1 g capsule, Take 500 mg by mouth daily OMEGA +  DHA= 500MG  - KELT, Disp: , Rfl:     Prenatal Vit-Fe Fumarate-FA (PRENATAL PO), Take by mouth, Disp: , Rfl:     VITAMIN D, CHOLECALCIFEROL, PO, Take 2,000 Units by mouth daily, Disp: , Rfl:     Zinc 50 MG TABS, , Disp: , Rfl:     famotidine (PEPCID) 40 MG tablet, Take 1 tablet (40 mg total) by mouth 2 (two) times a day (Patient not taking: Reported on 9/16/2024), Disp: 60 tablet, Rfl: 0      Review of Systems  Review of Systems           Objective     /80   Wt 74.4 kg (164 lb)   LMP 07/04/2024 (Approximate)   BMI 30.00 kg/m²     Physical Exam  Constitutional:       Appearance: Normal appearance.   HENT:      Head: Normocephalic and atraumatic.   Eyes:      Extraocular Movements: Extraocular movements intact.   Pulmonary:      Effort: Pulmonary effort is normal.   Musculoskeletal:         General: Normal range of motion.   Neurological:      Mental Status: She is alert. Mental status is at baseline.   Skin:     General: Skin is warm and dry.   Psychiatric:         Mood and Affect: Mood normal.         Behavior: Behavior normal.         I have spent a total time of 35 minutes in caring for this patient on the day of the visit/encounter including Diagnostic results, Prognosis, Risks and benefits of tx options, Instructions for management, Patient and family education, Documenting in the medical record, Reviewing / ordering tests,  medicine, procedures  , and Communicating with other healthcare professionals .

## 2024-10-30 NOTE — TELEPHONE ENCOUNTER
As per Marco Antonio in [facility], he is requesting the patients gestational age for the Prenatal chromosome mircroarray testing. Marco Antonio was notified of the gestational age of 16w6d, no further questions at this time.

## 2024-10-30 NOTE — PROGRESS NOTES
Assessment Doreen was seen today for routine prenatal visit.    Diagnoses and all orders for this visit:    16 weeks gestation of pregnancy         Plan    Doreen Terrell is being scheduled for exam under anesthesia and dilation and evacuation. Pre-op instructions discussed with patient.     We reviewed the risks, benefits, and alternatives today including bleeding, infection, pain, damage to surrounding structures, uterine perforation, need for hysterectomy. She is aware that the procedure will be performed under ultrasound guidance. Informed consent was obtained for EUA, D&E. We discussed code status - full code.  Blood transfusion is acceptable. PA ROXANNA form was reviewed and signed. Patient is unsure of disposition of remains, will re-address prior to surgery. Further genetic testing is not indicated. We reviewed need for laminaria placement day prior to procedure. She is aware of the scheduling process and will await call. All questions were answered to her satisfaction.       Subjective  Chief Complaint   Patient presents with    Routine Prenatal Visit          Doreen Terrell is a 39 y.o.  female  here for her first prenatal visit. She recently had a CVS done which showed Trisomy 21. Additionally she had a fetal echocardiogram yesterday that showed cardiac anomalies. The patient is understandably upset today and has been discussing her options with her . She wishes to proceed with termination at this time.      Patient Active Problem List   Diagnosis    Hyperlipidemia    Anxiety    GERD without esophagitis    Chronic right-sided thoracic back pain    Overweight    Mid back pain, chronic    Increased nuchal translucency space on fetal ultrasound    Multigravida of advanced maternal age in first trimester    H/O shoulder dystocia in prior pregnancy, currently pregnant, first trimester    History of gestational diabetes in prior pregnancy, currently pregnant in first trimester    Trisomy 21 of  fetus, current pregnancy, single gestation    Fetal hypoplastic left heart affecting antepartum care of mother, single gestation    16 weeks gestation of pregnancy       Obstetric History  OB History    Para Term  AB Living   4 2 2 0 1 2   SAB IAB Ectopic Multiple Live Births   1 0 0 0 2      # Outcome Date GA Lbr Josue/2nd Weight Sex Type Anes PTL Lv   4 Current            3 Term 10/14/21 40w0d / 00:19 3345 g (7 lb 6 oz) F Vag-Spont EPI N FAHAD   2 Term 19 39w5d / 02:14 3714 g (8 lb 3 oz) F Vag-Spont EPI N FAHAD      Complications: Shoulder Dystocia, GDM (gestational diabetes mellitus), Postpartum hemorrhage   1 2018 9w0d              Past Medical/Surgical/Family/Social History    Past Medical History:   Diagnosis Date    Abnormal Pap smear of cervix     HPV,leep    AMA (advanced maternal age) multigravida 35+, third trimester     Back pain     GERD (gastroesophageal reflux disease)     none presantly    Gestational diabetes w/ 1st pregnancy    Helicobacter pylori (H. pylori) infection cleared after treatment    History of postpartum hemorrhage, currently pregnant     History of shoulder dystocia in prior pregnancy, currently pregnant in third trimester     Varicella     as child     Past Surgical History:   Procedure Laterality Date    CERVICAL BIOPSY  W/ LOOP ELECTRODE EXCISION          COLONOSCOPY      SC ESOPHAGOGASTRODUODENOSCOPY TRANSORAL DIAGNOSTIC N/A 2016    Procedure: ESOPHAGOGASTRODUODENOSCOPY (EGD);  Surgeon: Thomas Lopez MD;  Location: [facility];  Service: Gastroenterology    WISDOM TOOTH EXTRACTION Bilateral      Family History   Problem Relation Age of Onset    Hyperlipidemia Mother     Depression Father     Hyperlipidemia Father     Depression Brother     Hypertension Brother     No Known Problems Daughter     No Known Problems Daughter     Frontotemporal dementia Maternal Grandmother     Other Maternal Grandmother         pre diabetes  Diet control     "Cancer Maternal Grandfather         bile duct cancer/stomach ca    Parkinsonism Paternal Grandmother     Other Paternal Grandmother         pre-diabetic    Heart disease Paternal Grandfather         MI    Depression Paternal Grandfather     Heart attack Paternal Grandfather 50 - 59         in his 50\"s    Mental illness Family         anxiety all the males     Social History     Socioeconomic History    Marital status: /Civil Union     Spouse name: Brandon    Number of children: 2    Years of education: Not on file    Highest education level: Associate degree: academic program   Occupational History     Comment: Works at the [employer]   Tobacco Use    Smoking status: Former     Current packs/day: 0.00     Average packs/day: 0.3 packs/day for 10.0 years (2.5 ttl pk-yrs)     Types: Cigarettes     Start date: 2009     Quit date: 2017     Years since quittin.8    Smokeless tobacco: Never    Tobacco comments:     college   Vaping Use    Vaping status: Never Used   Substance and Sexual Activity    Alcohol use: Not Currently     Alcohol/week: 1.0 standard drink of alcohol     Comment: social    Drug use: No    Sexual activity: Yes     Partners: Male     Birth control/protection: Condom Male     Comment: Brandon   Other Topics Concern    Not on file   Social History Narrative    Not on file     Social Determinants of Health     Financial Resource Strain: Not on file   Food Insecurity: No Food Insecurity (10/1/2024)    Nursing - Inadequate Food Risk Classification     Worried About Running Out of Food in the Last Year: Never true     Ran Out of Food in the Last Year: Never true     Ran Out of Food in the Last Year: Not on file   Transportation Needs: No Transportation Needs (10/1/2024)    PRAPARE - Transportation     Lack of Transportation (Medical): No     Lack of Transportation (Non-Medical): No   Physical Activity: Not on file   Stress: Not on file   Social Connections: Not on file   Intimate Partner " Violence: Not on file   Housing Stability: Low Risk  (10/1/2024)    Housing Stability Vital Sign     Unable to Pay for Housing in the Last Year: No     Number of Times Moved in the Last Year: 0     Homeless in the Last Year: No         Patient has no known allergies.    Current Outpatient Medications:     Cyanocobalamin (Vitamin B12 TR) 2000 MCG TBCR, Take 1 Dose by mouth once a week, Disp: , Rfl:     folic acid (FOLVITE) 800 MCG tablet, , Disp: , Rfl:     Kelp 0.15 MG TABS, , Disp: , Rfl:     omega-3-acid ethyl esters (LOVAZA) 1 g capsule, Take 500 mg by mouth daily OMEGA +  DHA= 500MG  - KELT, Disp: , Rfl:     Prenatal Vit-Fe Fumarate-FA (PRENATAL PO), Take by mouth, Disp: , Rfl:     VITAMIN D, CHOLECALCIFEROL, PO, Take 2,000 Units by mouth daily, Disp: , Rfl:     Zinc 50 MG TABS, , Disp: , Rfl:     famotidine (PEPCID) 40 MG tablet, Take 1 tablet (40 mg total) by mouth 2 (two) times a day (Patient not taking: Reported on 9/16/2024), Disp: 60 tablet, Rfl: 0      Review of Systems  Review of Systems           Objective     /80   Wt 74.4 kg (164 lb)   LMP 07/04/2024 (Approximate)   BMI 30.00 kg/m²     Physical Exam  Constitutional:       Appearance: Normal appearance.   HENT:      Head: Normocephalic and atraumatic.   Eyes:      Extraocular Movements: Extraocular movements intact.   Pulmonary:      Effort: Pulmonary effort is normal.   Musculoskeletal:         General: Normal range of motion.   Neurological:      Mental Status: She is alert. Mental status is at baseline.   Skin:     General: Skin is warm and dry.   Psychiatric:         Mood and Affect: Mood normal.         Behavior: Behavior normal.         I have spent a total time of 35 minutes in caring for this patient on the day of the visit/encounter including Diagnostic results, Prognosis, Risks and benefits of tx options, Instructions for management, Patient and family education, Documenting in the medical record, Reviewing / ordering tests,  medicine, procedures  , and Communicating with other healthcare professionals .

## 2024-10-30 NOTE — TELEPHONE ENCOUNTER
Spoke with the patient , she is very upset, she had an appointment and found out the baby  has Recent diagnosis of trisomy 21 and congenital heart disease , she has to schedule the surgery . Advised  that we are here for her , and if their is anything we can do for here to reach out to us , will follow up

## 2024-10-31 ENCOUNTER — ANESTHESIA EVENT (OUTPATIENT)
Dept: PERIOP | Facility: HOSPITAL | Age: 39
End: 2024-10-31
Payer: COMMERCIAL

## 2024-10-31 ENCOUNTER — TELEPHONE (OUTPATIENT)
Dept: OBGYN CLINIC | Facility: CLINIC | Age: 39
End: 2024-10-31

## 2024-10-31 ENCOUNTER — PREP FOR PROCEDURE (OUTPATIENT)
Dept: OBGYN CLINIC | Facility: CLINIC | Age: 39
End: 2024-10-31

## 2024-10-31 ENCOUNTER — PROCEDURE VISIT (OUTPATIENT)
Dept: OBGYN CLINIC | Facility: CLINIC | Age: 39
End: 2024-10-31

## 2024-10-31 VITALS
SYSTOLIC BLOOD PRESSURE: 116 MMHG | HEIGHT: 62 IN | BODY MASS INDEX: 30.11 KG/M2 | DIASTOLIC BLOOD PRESSURE: 74 MMHG | WEIGHT: 163.6 LBS

## 2024-10-31 DIAGNOSIS — O35.13X0 FETAL TRISOMY 21 AFFECTING CARE OF MOTHER, ANTEPARTUM, SINGLE OR UNSPECIFIED FETUS: Primary | ICD-10-CM

## 2024-10-31 DIAGNOSIS — O35.BXX0 FETAL CARDIAC MALFORMATION AFFECTING MANAGEMENT OF MOTHER: ICD-10-CM

## 2024-10-31 DIAGNOSIS — Z33.2: ICD-10-CM

## 2024-10-31 DIAGNOSIS — O35.9XX0 PREGNANCY AFFECTED BY MULTIPLE CONGENITAL ANOMALIES OF FETUS, SINGLE OR UNSPECIFIED FETUS: Primary | ICD-10-CM

## 2024-10-31 NOTE — PROGRESS NOTES
Insertion of cervical dilator     Date/Time  10/31/2024 8:00 AM     Performed by  Ashanti Morrow MD   Authorized by  Ashanti Morrow MD     Universal Protocol   Consent: Verbal consent obtained.  Consent given by: patient  Patient understanding: patient states understanding of the procedure being performed  Required items: required blood products, implants, devices, and special equipment available  Patient identity confirmed: verbally with patient      Local anesthesia used: yes      Anesthesia: nerve block     Anesthesia   Local anesthesia used: yes  Local Anesthetic: lidocaine 1% with epinephrine  Anesthetic total: 16 mL     Procedure Details   Procedure Notes: Reviewed procedure of laminaria placement with patient. Speculum was placed to visualize cervix. Betadine applied on cervix. Tenaculum applied to anterior lip of cervix. The following laminaria were placed in the cervix: 8 large, 1 medium, 1 small. 6 small gauzes soaked in betadine were placed in the vagina. Patient tolerated the procedure well. We reviewed precautions. Plan to remove all gauze and laminaria intra-operatively tomorrow.   Patient tolerance: patient tolerated the procedure well with no immediate complications

## 2024-10-31 NOTE — TELEPHONE ENCOUNTER
Called patient to check in s/p laminaria placement this morning. She is doing well. She has not passed any of the gauze or laminaria. She denies pain. We reviewed surgery time for tomorrow. We also discussed the presence of Dr Greene and a resident for the procedure. All questions answered.

## 2024-10-31 NOTE — TELEPHONE ENCOUNTER
----- Message from Ashanti Morrow MD sent at 10/30/2024  3:42 PM EDT -----    [facility] GYN Department  Surgery Scheduling Sheet    Patient Name: Doreen Terrell  : 1985    Provider: Ashanti Morrow MD     Needed: no; Language: N/A    Procedure: exam under anesthesia and dilation and evacuation (17w)    Diagnosis:Trisomy 21, cardiac malformation, desires termination    Special Needs or Equipment: abdominal ultrasound, Dr. Alexandre's D+E kit    Anesthesia: General anesthesia    Length of stay: outpatient  Does patient have comorbid conditions that will require close perioperative monitoring prior to safe discharge: no    -The patient has comorbid conditions that will require close perioperative monitoring prior to safe discharge, including N/A.   -This may require acute care beyond the usual and routine recovery period. As such, inpatient admission post-operatively is expected and appropriate, and anticipated hospital length of stay will be >2 midnights.    Pre-Admission Testing Needed: no   Labs that should be ordered: NA    Order PAT that is recommended in prep for procedure?: No    Medical Clearance Needed: no; Provider: N/A    MA Form Signed (tubals/hysterectomy): Not Indicated    Surgical Drink Given: no     How many days out of work: 7 day(s)     How many days no drivin day(s)       Is pre op appt needed?  Yes for laminaria placement day before surgery  Interval for post op appt: 2 week(s)     For Surgical Scheduler:     Surgery Scheduled On: FRI. 24-MORNING  Dent: [facility]    Pre-op Appt: 10/31/24 WITH LAMINARIA  Post op Appt: TO BE SCHEDULED  Consult/Medical clearance appt: N/A

## 2024-11-01 ENCOUNTER — ANESTHESIA (OUTPATIENT)
Dept: PERIOP | Facility: HOSPITAL | Age: 39
End: 2024-11-01
Payer: COMMERCIAL

## 2024-11-01 ENCOUNTER — HOSPITAL ENCOUNTER (OUTPATIENT)
Facility: HOSPITAL | Age: 39
Setting detail: OUTPATIENT SURGERY
Discharge: HOME | End: 2024-11-01
Attending: OBSTETRICS & GYNECOLOGY | Admitting: OBSTETRICS & GYNECOLOGY
Payer: COMMERCIAL

## 2024-11-01 VITALS
SYSTOLIC BLOOD PRESSURE: 92 MMHG | RESPIRATION RATE: 15 BRPM | HEART RATE: 91 BPM | BODY MASS INDEX: 30.18 KG/M2 | TEMPERATURE: 97.6 F | OXYGEN SATURATION: 99 % | DIASTOLIC BLOOD PRESSURE: 52 MMHG | WEIGHT: 164 LBS | HEIGHT: 62 IN

## 2024-11-01 DIAGNOSIS — O35.13X0 FETAL TRISOMY 21 AFFECTING CARE OF MOTHER, ANTEPARTUM, SINGLE OR UNSPECIFIED FETUS: ICD-10-CM

## 2024-11-01 DIAGNOSIS — Z33.2: ICD-10-CM

## 2024-11-01 DIAGNOSIS — O35.BXX0 FETAL CARDIAC MALFORMATION AFFECTING MANAGEMENT OF MOTHER: ICD-10-CM

## 2024-11-01 LAB
ABO GROUP BLD: NORMAL
ARRAY TYPE: NORMAL
BLD GP AB SCN SERPL QL: POSITIVE
BLOOD GROUP ANTIBODIES SERPL: NORMAL
CELLS ANALYZED CVS: 20
CELLS ANALYZED CVS: 50
CELLS COUNTED CVS: 20
CELLS COUNTED CVS: 50
CELLS KARYOTYPED.TOTAL CVS: 2
CHR 13+18+21+X+Y ANEUP BLD/T FISH: NORMAL
CHROM ANALY INTERPHASE BLD FISH-IMP: NORMAL
CHROMOSOME BLD/T: NORMAL
CLINICAL CYTOGENETICIST SPEC: NORMAL
CLINICAL CYTOGENETICIST SPEC: NORMAL
DIAGNOSTIC IMP SPEC-IMP: NORMAL
GENE XXX MUT ANL BLD/T: NORMAL
GENOTYPING TARGETS: NORMAL
ISCN BAND LEVEL CVS QL: 400
KARYOTYP CVS: NORMAL
KARYOTYP CVS: NORMAL
LAB DIRECTOR NAME PROVIDER: NORMAL
LAB DIRECTOR NAME PROVIDER: NORMAL
RH BLD: NEGATIVE
SL AMB SPECIMEN TYPE: NORMAL
SPECIMEN EXPIRATION DATE: NORMAL
SPECIMEN SOURCE: NORMAL

## 2024-11-01 PROCEDURE — 86900 BLOOD TYPING SEROLOGIC ABO: CPT | Performed by: OBSTETRICS & GYNECOLOGY

## 2024-11-01 PROCEDURE — 59841 INDUCED ABORTION DILAT&EVAC: CPT | Performed by: OBSTETRICS & GYNECOLOGY

## 2024-11-01 PROCEDURE — 86850 RBC ANTIBODY SCREEN: CPT | Performed by: OBSTETRICS & GYNECOLOGY

## 2024-11-01 PROCEDURE — 88305 TISSUE EXAM BY PATHOLOGIST: CPT | Performed by: PATHOLOGY

## 2024-11-01 PROCEDURE — 86870 RBC ANTIBODY IDENTIFICATION: CPT | Performed by: OBSTETRICS & GYNECOLOGY

## 2024-11-01 PROCEDURE — 86901 BLOOD TYPING SEROLOGIC RH(D): CPT | Performed by: OBSTETRICS & GYNECOLOGY

## 2024-11-01 PROCEDURE — 76998 US GUIDE INTRAOP: CPT | Performed by: OBSTETRICS & GYNECOLOGY

## 2024-11-01 RX ORDER — PROMETHAZINE HYDROCHLORIDE 25 MG/ML
12.5 INJECTION, SOLUTION INTRAMUSCULAR; INTRAVENOUS ONCE AS NEEDED
Status: DISCONTINUED | OUTPATIENT
Start: 2024-11-01 | End: 2024-11-01 | Stop reason: HOSPADM

## 2024-11-01 RX ORDER — PHENYLEPHRINE HCL IN 0.9% NACL 1 MG/10 ML
SYRINGE (ML) INTRAVENOUS AS NEEDED
Status: DISCONTINUED | OUTPATIENT
Start: 2024-11-01 | End: 2024-11-01

## 2024-11-01 RX ORDER — SUCCINYLCHOLINE/SOD CL,ISO/PF 100 MG/5ML
SYRINGE (ML) INTRAVENOUS AS NEEDED
Status: DISCONTINUED | OUTPATIENT
Start: 2024-11-01 | End: 2024-11-01

## 2024-11-01 RX ORDER — ONDANSETRON 2 MG/ML
INJECTION INTRAMUSCULAR; INTRAVENOUS AS NEEDED
Status: DISCONTINUED | OUTPATIENT
Start: 2024-11-01 | End: 2024-11-01

## 2024-11-01 RX ORDER — LIDOCAINE HYDROCHLORIDE 20 MG/ML
INJECTION, SOLUTION EPIDURAL; INFILTRATION; INTRACAUDAL; PERINEURAL AS NEEDED
Status: DISCONTINUED | OUTPATIENT
Start: 2024-11-01 | End: 2024-11-01

## 2024-11-01 RX ORDER — MIDAZOLAM HYDROCHLORIDE 2 MG/2ML
INJECTION, SOLUTION INTRAMUSCULAR; INTRAVENOUS AS NEEDED
Status: DISCONTINUED | OUTPATIENT
Start: 2024-11-01 | End: 2024-11-01

## 2024-11-01 RX ORDER — OXYCODONE HYDROCHLORIDE 5 MG/1
5 TABLET ORAL EVERY 4 HOURS PRN
Status: CANCELLED | OUTPATIENT
Start: 2024-11-01

## 2024-11-01 RX ORDER — ALBUTEROL SULFATE 0.83 MG/ML
2.5 SOLUTION RESPIRATORY (INHALATION) ONCE AS NEEDED
Status: DISCONTINUED | OUTPATIENT
Start: 2024-11-01 | End: 2024-11-01 | Stop reason: HOSPADM

## 2024-11-01 RX ORDER — KETOROLAC TROMETHAMINE 30 MG/ML
INJECTION, SOLUTION INTRAMUSCULAR; INTRAVENOUS AS NEEDED
Status: DISCONTINUED | OUTPATIENT
Start: 2024-11-01 | End: 2024-11-01

## 2024-11-01 RX ORDER — MEPERIDINE HYDROCHLORIDE 25 MG/ML
12.5 INJECTION INTRAMUSCULAR; INTRAVENOUS; SUBCUTANEOUS
Status: DISCONTINUED | OUTPATIENT
Start: 2024-11-01 | End: 2024-11-01 | Stop reason: HOSPADM

## 2024-11-01 RX ORDER — IBUPROFEN 600 MG/1
600 TABLET, FILM COATED ORAL EVERY 6 HOURS PRN
Status: CANCELLED | OUTPATIENT
Start: 2024-11-01

## 2024-11-01 RX ORDER — FENTANYL CITRATE/PF 50 MCG/ML
25 SYRINGE (ML) INJECTION
Status: DISCONTINUED | OUTPATIENT
Start: 2024-11-01 | End: 2024-11-01 | Stop reason: HOSPADM

## 2024-11-01 RX ORDER — MISOPROSTOL 100 UG/1
TABLET ORAL AS NEEDED
Status: DISCONTINUED | OUTPATIENT
Start: 2024-11-01 | End: 2024-11-01 | Stop reason: HOSPADM

## 2024-11-01 RX ORDER — FENTANYL CITRATE 50 UG/ML
INJECTION, SOLUTION INTRAMUSCULAR; INTRAVENOUS AS NEEDED
Status: DISCONTINUED | OUTPATIENT
Start: 2024-11-01 | End: 2024-11-01

## 2024-11-01 RX ORDER — DEXAMETHASONE SODIUM PHOSPHATE 10 MG/ML
INJECTION, SOLUTION INTRAMUSCULAR; INTRAVENOUS AS NEEDED
Status: DISCONTINUED | OUTPATIENT
Start: 2024-11-01 | End: 2024-11-01

## 2024-11-01 RX ORDER — HYDROMORPHONE HCL/PF 1 MG/ML
0.5 SYRINGE (ML) INJECTION
Status: DISCONTINUED | OUTPATIENT
Start: 2024-11-01 | End: 2024-11-01 | Stop reason: HOSPADM

## 2024-11-01 RX ORDER — PROPOFOL 10 MG/ML
INJECTION, EMULSION INTRAVENOUS AS NEEDED
Status: DISCONTINUED | OUTPATIENT
Start: 2024-11-01 | End: 2024-11-01

## 2024-11-01 RX ORDER — SODIUM CHLORIDE, SODIUM LACTATE, POTASSIUM CHLORIDE, CALCIUM CHLORIDE 600; 310; 30; 20 MG/100ML; MG/100ML; MG/100ML; MG/100ML
INJECTION, SOLUTION INTRAVENOUS CONTINUOUS PRN
Status: DISCONTINUED | OUTPATIENT
Start: 2024-11-01 | End: 2024-11-01

## 2024-11-01 RX ORDER — DOXYCYCLINE 100 MG/1
200 CAPSULE ORAL ONCE
Status: COMPLETED | OUTPATIENT
Start: 2024-11-01 | End: 2024-11-01

## 2024-11-01 RX ORDER — ONDANSETRON 2 MG/ML
4 INJECTION INTRAMUSCULAR; INTRAVENOUS ONCE AS NEEDED
Status: DISCONTINUED | OUTPATIENT
Start: 2024-11-01 | End: 2024-11-01 | Stop reason: HOSPADM

## 2024-11-01 RX ORDER — ACETAMINOPHEN 325 MG/1
975 TABLET ORAL EVERY 6 HOURS PRN
Status: CANCELLED | OUTPATIENT
Start: 2024-11-01

## 2024-11-01 RX ADMIN — ONDANSETRON 4 MG: 2 INJECTION INTRAMUSCULAR; INTRAVENOUS at 10:12

## 2024-11-01 RX ADMIN — Medication 150 MCG: at 10:38

## 2024-11-01 RX ADMIN — SODIUM CHLORIDE, SODIUM LACTATE, POTASSIUM CHLORIDE, AND CALCIUM CHLORIDE: .6; .31; .03; .02 INJECTION, SOLUTION INTRAVENOUS at 09:50

## 2024-11-01 RX ADMIN — LIDOCAINE HYDROCHLORIDE 80 MG: 20 INJECTION, SOLUTION EPIDURAL; INFILTRATION; INTRACAUDAL at 10:04

## 2024-11-01 RX ADMIN — Medication 100 MCG: at 10:28

## 2024-11-01 RX ADMIN — DEXAMETHASONE SODIUM PHOSPHATE 10 MG: 10 INJECTION INTRAMUSCULAR; INTRAVENOUS at 10:04

## 2024-11-01 RX ADMIN — FENTANYL CITRATE 100 MCG: 50 INJECTION INTRAMUSCULAR; INTRAVENOUS at 10:04

## 2024-11-01 RX ADMIN — DOXYCYCLINE 200 MG: 100 CAPSULE ORAL at 09:35

## 2024-11-01 RX ADMIN — DEXMEDETOMIDINE 8 MCG: 100 INJECTION, SOLUTION INTRAVENOUS at 10:47

## 2024-11-01 RX ADMIN — MIDAZOLAM HYDROCHLORIDE 2 MG: 1 INJECTION, SOLUTION INTRAMUSCULAR; INTRAVENOUS at 09:57

## 2024-11-01 RX ADMIN — KETOROLAC TROMETHAMINE 30 MG: 30 INJECTION, SOLUTION INTRAMUSCULAR; INTRAVENOUS at 10:14

## 2024-11-01 RX ADMIN — DEXMEDETOMIDINE 12 MCG: 100 INJECTION, SOLUTION INTRAVENOUS at 10:04

## 2024-11-01 RX ADMIN — Medication 100 MG: at 10:04

## 2024-11-01 RX ADMIN — PROPOFOL 200 MG: 10 INJECTION, EMULSION INTRAVENOUS at 10:04

## 2024-11-01 NOTE — ANESTHESIA POSTPROCEDURE EVALUATION
Post-Op Assessment Note            No anethesia notable event occurred.            Last Filed PACU Vitals:  Vitals Value Taken Time   Temp 97.6 °F (36.4 °C) 11/01/24 1148   Pulse 91 11/01/24 1148   BP 92/52 11/01/24 1148   Resp 15 11/01/24 1148   SpO2 99 % 11/01/24 1148       Modified Thomas:  Activity: 2 (11/1/2024 11:48 AM)  Respiration: 2 (11/1/2024 11:48 AM)  Circulation: 2 (11/1/2024 11:48 AM)  Consciousness: 2 (11/1/2024 11:48 AM)  Oxygen Saturation: 2 (11/1/2024 11:48 AM)  Modified Thomas Score: 10 (11/1/2024 11:48 AM)

## 2024-11-01 NOTE — OP NOTE
OPERATIVE REPORT  PATIENT NAME: Doreen Terrell    :  1985  MRN: [id number]  Pt Location: AN OR ROOM 03    SURGERY DATE: 2024    Surgeons and Role:     * Ashanti Morrow MD - Primary     * Albin Guzman MD - Assisting     * Tasia Greene MD - Assisting    Preop Diagnosis:  Fetal trisomy 21 affecting care of mother, antepartum, single or unspecified fetus [O35.13X0]  Fetal cardiac malformation affecting management of mother [O35.BXX0]  Complete induced termination of pregnancy [Z33.2]    Post-Op Diagnosis Codes:     * Fetal trisomy 21 affecting care of mother, antepartum, single or unspecified fetus [O35.13X0]     * Fetal cardiac malformation affecting management of mother [O35.BXX0]     * Complete induced termination of pregnancy [Z33.2]    Procedure(s):  EXAM UNDER ANESTHESIA; DILATATION AND EVACUATION (D&E) (17 WEEKS)    Specimen(s):  ID Type Source Tests Collected by Time Destination   1 : Products of Conception Tissue Products of Conception TISSUE EXAM Ashanti Morrow MD 2024 1008        Estimated Blood Loss: 300 cc    Anesthesia Type:   General endotracheal tube    Operative Indications:  Fetal trisomy 21 affecting care of mother, antepartum, single or unspecified fetus [O35.13X0]  Fetal cardiac malformation affecting management of mother [O35.BXX0]  Complete induced termination of pregnancy [Z33.2]    Operative Findings:  Normal appearing external female genitalia  Normal appearing cervical surface w/o lesions  10 laminaria removed and two gauze removed, 4 fell out at home  SVE 3/50/-3  Uterus with thin endometrial stripe with minimal vaginal bleeding at conclusion of procedure    Complications:   None apparent    Procedure and Technique:  The patient was taken to the operating room where she was properly identified. General endotracheal anesthesia was obtained without difficulty. She was prepped and draped in the normal sterile fashion in the dorsal lithotomy position using the  sukh. Care was taken to avoid excessive flexion or extension of the patients hips and knees or pressure on her extremities. A time out was performed and everyone was in agreement. The patient received PO doxycyline in holding.     10 laminaria were and two small guaze were removed from the vagina.    A weighted speculum was placed in the patient's vagina and the anterior lip of the cervix was identified and grasped with a Tenaculum. The cervix was serially dilated to 17 mm Hina dilator. AROM was performed for clear fluid. The fetus was then delivered breech and intact. 16 mm curved suction tip was used to suction remaining POC. The uterus was massaged and firm. All instruments were removed from the patient's vagina. There was slow oozing noted from the Tenaculum site. This was clamped with ring forceps with good hemostasis achieved.  1000 mcg of misoprostol was placed in the rectum. The entirety of the procedure was done under ultrasound guidance. A thin endometrial stripe visualized at conclusion of procedure.     The patient tolerated the procedure well. Sponge, instrument and needle counts were correct times 2. The patient was cleansed, awakened from anesthesia and taken to the recovery room in stable condition. Dr. Morrow present for the entirety of the procedure.    Patient Disposition:  PACU     SIGNATURE: Albin Guzman MD  DATE: November 1, 2024  TIME: 11:01 AM

## 2024-11-01 NOTE — ANESTHESIA PREPROCEDURE EVALUATION
Procedure:  EXAM UNDER ANESTHESIA; DILATATION AND EVACUATION (D&E) (17 WEEKS) (Uterus)    Relevant Problems   CARDIO   (+) Hyperlipidemia      GI/HEPATIC   (+) GERD without esophagitis      GYN   (+) 16 weeks gestation of pregnancy   (+) Multigravida of advanced maternal age in first trimester      MUSCULOSKELETAL   (+) Chronic right-sided thoracic back pain   (+) Mid back pain, chronic      NEURO/PSYCH   (+) Anxiety   (+) Chronic right-sided thoracic back pain   (+) Mid back pain, chronic        Physical Exam    Airway    Mallampati score: I  TM Distance: >3 FB  Neck ROM: full     Dental       Cardiovascular  Cardiovascular exam normal    Pulmonary  Pulmonary exam normal     Other Findings  post-pubertal.      Anesthesia Plan  ASA Score- 2     Anesthesia Type- general with ASA Monitors.         Additional Monitors:     Airway Plan: ETT.           Plan Factors-Exercise tolerance (METS): >4 METS.    Chart reviewed. EKG reviewed. Imaging results reviewed. Existing labs reviewed. Patient summary reviewed.    Patient is not a current smoker.  Patient did not smoke on day of surgery.    Obstructive sleep apnea risk education given perioperatively.        Induction- intravenous.    Postoperative Plan- Plan for postoperative opioid use. Planned trial extubation        Informed Consent- Anesthetic plan and risks discussed with patient.  I personally reviewed this patient with the CRNA. Discussed and agreed on the Anesthesia Plan with the CRNA..

## 2024-11-01 NOTE — INTERVAL H&P NOTE
H&P reviewed. After examining the patient I find no changes in the patients condition since the H&P had been written.  Reviewed with the patient that she will not need Rhogam after this procedure since she got an injection 2 weeks ago [facility] her [facility].   Reviewed with patient that she still wants hospital disposition.    Vitals:    11/01/24 0847   BP: 114/66   Pulse: (!) 107   Resp: 16   Temp: 99.3 °F (37.4 °C)   SpO2: 98%     Physical Exam   Exam unchanged from 2 days ago  Cardio: regular rate and rhythm  Pulm: clear to auscultation bilaterally

## 2024-11-01 NOTE — ANESTHESIA POSTPROCEDURE EVALUATION
Post-Op Assessment Note    CV Status:  Stable    Pain management: adequate       Mental Status:  Awake and sleepy   Hydration Status:  Euvolemic   PONV Controlled:  Controlled   Airway Patency:  Patent  Airway: intubated     Post Op Vitals Reviewed: Yes    No anethesia notable event occurred.    Staff: CRNA           Last Filed PACU Vitals:  Vitals Value Taken Time   Temp 97.1    Pulse 93    BP 96/52    Resp 17    SpO2 99        Modified Thomas:  No data recorded

## 2024-11-04 ENCOUNTER — TELEPHONE (OUTPATIENT)
Dept: OBGYN CLINIC | Facility: CLINIC | Age: 39
End: 2024-11-04

## 2024-11-04 ENCOUNTER — TELEPHONE (OUTPATIENT)
Age: 39
End: 2024-11-04

## 2024-11-04 NOTE — TELEPHONE ENCOUNTER
----- Message from Monica ALMEIDA sent at 10/30/2024  4:09 PM EDT -----  Regarding: surgery  Recent diagnosis of trisomy 21 and congenital heart disease, awaiting surgery

## 2024-11-04 NOTE — TELEPHONE ENCOUNTER
Called the patient for a follow up call to see how she is doing and to see if their is anything that she needs ,patient had surgery on 11/01/2024,Recent diagnosis of trisomy 21 and congenital heart disease  ,left her a voice message to return the call

## 2024-11-05 NOTE — TELEPHONE ENCOUNTER
Called the patient to see how she is doing and if their us anything she needs.left  a voice message to return the call

## 2024-11-06 PROCEDURE — 88305 TISSUE EXAM BY PATHOLOGIST: CPT | Performed by: PATHOLOGY

## 2024-11-13 ENCOUNTER — TELEPHONE (OUTPATIENT)
Facility: HOSPITAL | Age: 39
End: 2024-11-13

## 2024-11-13 ENCOUNTER — RESULTS FOLLOW-UP (OUTPATIENT)
Dept: OBGYN CLINIC | Facility: CLINIC | Age: 39
End: 2024-11-13

## 2024-11-13 LAB
CFTR FULL MUT ANL BLD/T SEQ: NORMAL
CITATION REF LAB TEST: NORMAL
CLINICAL INFO: NORMAL
ETHNIC BACKGROUND STATED: NORMAL
GENE DIS ANL CARRIER INTERP-IMP: NORMAL
INDICATION: NORMAL
LAB DIRECTOR NAME PROVIDER: NORMAL
RECOMMENDATION PATIENT DOC-IMP: NORMAL
REF LAB TEST METHOD: NORMAL
SERVICE CMNT-IMP: NORMAL
SPECIMEN SOURCE: NORMAL

## 2024-11-13 NOTE — TELEPHONE ENCOUNTER
I called Doreen to check in and see how she was doing. As she did not  I left a voicemail, offering support resources, the opportunity to ask questions, or just a listening ear. Provided callback number.    Joyce Owens, CGC

## 2024-11-14 ENCOUNTER — RESULTS FOLLOW-UP (OUTPATIENT)
Facility: HOSPITAL | Age: 39
End: 2024-11-14

## 2025-01-14 DIAGNOSIS — Z00.6 ENCOUNTER FOR EXAMINATION FOR NORMAL COMPARISON OR CONTROL IN CLINICAL RESEARCH PROGRAM: ICD-10-CM

## 2025-01-15 ENCOUNTER — HOSPITAL ENCOUNTER (OUTPATIENT)
Dept: MAMMOGRAPHY | Facility: HOSPITAL | Age: 40
Discharge: HOME | End: 2025-01-15
Payer: COMMERCIAL

## 2025-01-15 VITALS — HEIGHT: 62 IN | WEIGHT: 164 LBS | BODY MASS INDEX: 30.18 KG/M2

## 2025-01-15 DIAGNOSIS — Z12.31 ENCOUNTER FOR SCREENING MAMMOGRAM FOR BREAST CANCER: ICD-10-CM

## 2025-01-15 PROCEDURE — 77067 SCR MAMMO BI INCL CAD: CPT

## 2025-01-15 PROCEDURE — 77063 BREAST TOMOSYNTHESIS BI: CPT

## 2025-01-20 ENCOUNTER — RESULTS FOLLOW-UP (OUTPATIENT)
Dept: FAMILY MEDICINE CLINIC | Facility: CLINIC | Age: 40
End: 2025-01-20

## 2025-01-28 ENCOUNTER — APPOINTMENT (OUTPATIENT)
Dept: LAB | Facility: CLINIC | Age: 40
End: 2025-01-28

## 2025-01-28 DIAGNOSIS — Z00.6 ENCOUNTER FOR EXAMINATION FOR NORMAL COMPARISON OR CONTROL IN CLINICAL RESEARCH PROGRAM: ICD-10-CM

## 2025-01-28 PROCEDURE — 36415 COLL VENOUS BLD VENIPUNCTURE: CPT

## 2025-02-11 ENCOUNTER — OB ABSTRACT (OUTPATIENT)
Dept: OBGYN CLINIC | Facility: CLINIC | Age: 40
End: 2025-02-11

## 2025-02-12 LAB
APOB+LDLR+PCSK9 GENE MUT ANL BLD/T: NOT DETECTED
BRCA1+BRCA2 DEL+DUP + FULL MUT ANL BLD/T: NOT DETECTED
MLH1+MSH2+MSH6+PMS2 GN DEL+DUP+FUL M: NOT DETECTED

## 2025-02-17 NOTE — TELEPHONE ENCOUNTER
----- Message from Mariel Padilla MD sent at 2/15/2025  2:06 PM CST -----  Mild periorbital swelling left side. Follow up closely with Dr Peterson, results will be forwarded to him.  Dr Padilla    Please call patient with results and fax results to ophthalmology DR Peterson   [facility] called and they also faxed over labs on Friday and pt has abnormal results for microarray . She did not need a call back and this is FYI. Thank you

## 2025-03-06 ENCOUNTER — OFFICE VISIT (OUTPATIENT)
Dept: FAMILY MEDICINE CLINIC | Facility: CLINIC | Age: 40
End: 2025-03-06
Payer: COMMERCIAL

## 2025-03-06 VITALS
HEIGHT: 62 IN | HEART RATE: 81 BPM | RESPIRATION RATE: 17 BRPM | WEIGHT: 170 LBS | TEMPERATURE: 97.3 F | SYSTOLIC BLOOD PRESSURE: 120 MMHG | OXYGEN SATURATION: 98 % | DIASTOLIC BLOOD PRESSURE: 74 MMHG | BODY MASS INDEX: 31.28 KG/M2

## 2025-03-06 DIAGNOSIS — E78.49 OTHER HYPERLIPIDEMIA: ICD-10-CM

## 2025-03-06 DIAGNOSIS — Z00.00 ANNUAL PHYSICAL EXAM: Primary | ICD-10-CM

## 2025-03-06 DIAGNOSIS — F41.9 ANXIETY: ICD-10-CM

## 2025-03-06 PROBLEM — O35.BXX0: Status: RESOLVED | Noted: 2024-10-28 | Resolved: 2025-03-06

## 2025-03-06 PROBLEM — Z86.32 HISTORY OF GESTATIONAL DIABETES IN PRIOR PREGNANCY, CURRENTLY PREGNANT IN FIRST TRIMESTER: Status: RESOLVED | Noted: 2024-10-03 | Resolved: 2025-03-06

## 2025-03-06 PROBLEM — O09.291 HISTORY OF GESTATIONAL DIABETES IN PRIOR PREGNANCY, CURRENTLY PREGNANT IN FIRST TRIMESTER: Status: RESOLVED | Noted: 2024-10-03 | Resolved: 2025-03-06

## 2025-03-06 PROBLEM — O09.521 MULTIGRAVIDA OF ADVANCED MATERNAL AGE IN FIRST TRIMESTER: Status: RESOLVED | Noted: 2024-10-03 | Resolved: 2025-03-06

## 2025-03-06 PROBLEM — O28.3 INCREASED NUCHAL TRANSLUCENCY SPACE ON FETAL ULTRASOUND: Status: RESOLVED | Noted: 2024-10-03 | Resolved: 2025-03-06

## 2025-03-06 PROBLEM — O09.291 H/O SHOULDER DYSTOCIA IN PRIOR PREGNANCY, CURRENTLY PREGNANT, FIRST TRIMESTER: Status: RESOLVED | Noted: 2024-10-03 | Resolved: 2025-03-06

## 2025-03-06 PROBLEM — Z3A.16 16 WEEKS GESTATION OF PREGNANCY: Status: RESOLVED | Noted: 2024-10-28 | Resolved: 2025-03-06

## 2025-03-06 PROBLEM — O35.13X0: Status: RESOLVED | Noted: 2024-10-28 | Resolved: 2025-03-06

## 2025-03-06 PROCEDURE — 99396 PREV VISIT EST AGE 40-64: CPT | Performed by: FAMILY MEDICINE

## 2025-03-06 RX ORDER — AMPICILLIN TRIHYDRATE 250 MG
100 CAPSULE ORAL
COMMUNITY

## 2025-03-06 RX ORDER — FOLIC ACID 1 MG/1
800 TABLET ORAL DAILY
COMMUNITY

## 2025-03-06 NOTE — PROGRESS NOTES
Adult Annual Physical  Name: Doreen Terrell      : 1985      MRN: [id number]  Encounter Provider: Ana Carpenter MD  Encounter Date: 3/6/2025   Encounter department: [facility]    Assessment & Plan  Annual physical exam    Orders:  •  Comprehensive metabolic panel  •  CBC and differential  •  Lipid panel  •  Hemoglobin A1C    Anxiety  Stable   Continue to monitor        Other hyperlipidemia  Due for labs        Immunizations and preventive care screenings were discussed with patient today. Appropriate education was printed on patient's after visit summary.    Counseling:  Alcohol/drug use: discussed moderation in alcohol intake, the recommendations for healthy alcohol use, and avoidance of illicit drug use.  Dental Health: discussed importance of regular tooth brushing, flossing, and dental visits.  Injury prevention: discussed safety/seat belts, safety helmets, smoke detectors, carbon monoxide detectors, and smoking near bedding or upholstery.  Sexual health: discussed sexually transmitted diseases, partner selection, use of condoms, avoidance of unintended pregnancy, and contraceptive alternatives.  Exercise: the importance of regular exercise/physical activity was discussed. Recommend exercise 3-5 times per week for at least 30 minutes.          History of Present Illness     Adult Annual Physical:  Patient presents for annual physical.     Diet and Physical Activity:  - Diet/Nutrition: well balanced diet and consuming 3-5 servings of fruits/vegetables daily.  - Exercise: moderate cardiovascular exercise and 3-4 times a week on average.    General Health:  - Sleep: sleeps well and 7-8 hours of sleep on average.  - Hearing: normal hearing bilateral ears.  - Vision: goes for regular eye exams.  - Dental: brushes teeth twice daily and regular dental visits.    /GYN Health:  - Follows with GYN: yes.   - Menopause: premenopausal.   - History of STDs: no    Advanced Care Planning:  -  Has an advanced directive?: no    - Has a durable medical POA?: no    - ACP document given to patient?: no      Review of Systems   Constitutional:  Negative for chills and fever.   HENT:  Negative for ear pain and sore throat.    Eyes: Negative.  Negative for pain and visual disturbance.   Respiratory:  Negative for cough and shortness of breath.    Cardiovascular:  Negative for chest pain and palpitations.   Gastrointestinal:  Negative for abdominal pain and vomiting.   Genitourinary:  Negative for dysuria and hematuria.   Musculoskeletal:  Negative for arthralgias and back pain.   Skin:  Negative for color change and rash.   Neurological:  Negative for seizures and syncope.   Hematological: Negative.    Psychiatric/Behavioral: Negative.     All other systems reviewed and are negative.    Medical History Reviewed by provider this encounter:  Tobacco  Allergies  Meds  Problems  Med Hx  Surg Hx  Fam Hx     .  Past Medical History   Past Medical History:   Diagnosis Date   • Abnormal Pap smear of cervix 2000    HPV,leep   • AMA (advanced maternal age) multigravida 35+, third trimester 06/03/2021   • Back pain    • GERD (gastroesophageal reflux disease)     none presantly   • GERD without esophagitis 06/28/2016   • Gestational diabetes w/ 1st pregnancy   • H/O shoulder dystocia in prior pregnancy, currently pregnant, first trimester 10/03/2024   • Helicobacter pylori (H. pylori) infection cleared after treatment   • History of postpartum hemorrhage, currently pregnant 07/27/2021   • History of shoulder dystocia in prior pregnancy, currently pregnant in third trimester 06/03/2021   • Varicella     as child     Past Surgical History:   Procedure Laterality Date   • CERVICAL BIOPSY  W/ LOOP ELECTRODE EXCISION      2012   • COLONOSCOPY     • AL ESOPHAGOGASTRODUODENOSCOPY TRANSORAL DIAGNOSTIC N/A 06/30/2016    Procedure: ESOPHAGOGASTRODUODENOSCOPY (EGD);  Surgeon: Thomas Lopez MD;  Location: [facility];   "Service: Gastroenterology   • [place] INDUCED  DILATION & EVACUATION N/A 2024    Procedure: EXAM UNDER ANESTHESIA; DILATATION AND EVACUATION (D&E) (17 WEEKS);  Surgeon: Ashanti Morrow MD;  [facility];  Service: Gynecology   • WISDOM TOOTH EXTRACTION Bilateral      Family History   Problem Relation Age of Onset   • Hyperlipidemia Mother    • Depression Father    • Hyperlipidemia Father    • No Known Problems Daughter    • No Known Problems Daughter    • Frontotemporal dementia Maternal Grandmother    • Cancer Maternal Grandfather         bile duct cancer/stomach ca   • Parkinsonism Paternal Grandmother    • Heart disease Paternal Grandfather         MI   • Depression Paternal Grandfather    • Heart attack Paternal Grandfather 50 - 59         in his 50\"s   • Depression Brother    • Hypertension Brother    • No Known Problems Maternal Aunt    • No Known Problems Paternal Aunt       reports that she quit smoking about 8 years ago. Her smoking use included cigarettes. She started smoking about 16 years ago. She has a 2.5 pack-year smoking history. She has never used smokeless tobacco. She reports that she does not currently use alcohol after a past usage of about 1.0 standard drink of alcohol per week. She reports that she does not use drugs.  Current Outpatient Medications   Medication Instructions   • Biotin 5000 MCG CAPS Take by mouth   • Co Q-10 100 mg   • Cyanocobalamin (Vitamin B12 TR) 2000 MCG TBCR 1 Dose, Weekly   • folic acid (FOLVITE) 800 mcg, Daily   • Kelp 0.15 MG TABS    • Multiple Vitamins-Minerals (MULTIVITAL PO) Take by mouth   • omega-3-acid ethyl esters (LOVAZA) 500 mg, Daily   • VITAMIN D, CHOLECALCIFEROL, PO 2,000 Units, Daily   • Zinc 50 MG TABS    No Known Allergies   Current Outpatient Medications on File Prior to Visit   Medication Sig Dispense Refill   • Biotin 5000 MCG CAPS Take by mouth     • Co Q-10 50 MG Take 100 mg by mouth     • Cyanocobalamin (Vitamin B12 TR) 2000" "MCG TBCR Take 1 Dose by mouth once a week 5000mcg     • folic acid (FOLVITE) 1 mg tablet Take 800 mcg by mouth daily     • Kelp 0.15 MG TABS      • Multiple Vitamins-Minerals (MULTIVITAL PO) Take by mouth     • omega-3-acid ethyl esters (LOVAZA) 1 g capsule Take 500 mg by mouth daily OMEGA +  DHA= 500MG  - KELT     • VITAMIN D, CHOLECALCIFEROL, PO Take 2,000 Units by mouth daily     • Zinc 50 MG TABS      • [DISCONTINUED] famotidine (PEPCID) 40 MG tablet Take 1 tablet (40 mg total) by mouth 2 (two) times a day (Patient not taking: Reported on 2024) 60 tablet 0     No current facility-administered medications on file prior to visit.      Social History     Tobacco Use   • Smoking status: Former     Current packs/day: 0.00     Average packs/day: 0.3 packs/day for 10.0 years (2.5 ttl pk-yrs)     Types: Cigarettes     Start date: 2009     Quit date: 2017     Years since quittin.1   • Smokeless tobacco: Never   • Tobacco comments:     [email or web address]   Vaping Use   • Vaping status: Never Used   Substance and Sexual Activity   • Alcohol use: Not Currently     Alcohol/week: 1.0 standard drink of alcohol     Comment: social   • Drug use: No   • Sexual activity: Yes     Partners: Male     Birth control/protection: Condom Male     Comment: Brandon       Objective   /74 (BP Location: Left arm, Patient Position: Sitting, Cuff Size: Standard)   Pulse 81   Temp (!) 97.3 °F (36.3 °C) (Tympanic)   Resp 17   Ht 5' 2.36\" (1.584 m)   Wt 77.1 kg (170 lb)   SpO2 98%   BMI 30.73 kg/m²     Physical Exam  Vitals and nursing note reviewed.   Constitutional:       Appearance: Normal appearance. She is well-developed.   HENT:      Head: Normocephalic and atraumatic.      Right Ear: Tympanic membrane normal.      Left Ear: Tympanic membrane normal.      Nose: Nose normal.   Eyes:      Pupils: Pupils are equal, round, and reactive to light.   Neck:      Thyroid: No thyromegaly.   Cardiovascular:      Rate and Rhythm: Normal " rate and regular rhythm.      Heart sounds: No murmur heard.  Pulmonary:      Effort: Pulmonary effort is normal.      Breath sounds: Normal breath sounds.   Abdominal:      General: Bowel sounds are normal.      Palpations: Abdomen is soft.   Musculoskeletal:         General: No deformity. Normal range of motion.      Cervical back: Normal range of motion and neck supple.   Skin:     General: Skin is warm.      Findings: No erythema or rash.   Neurological:      General: No focal deficit present.      Mental Status: She is alert and oriented to person, place, and time.      Deep Tendon Reflexes: Reflexes are normal and symmetric.   Psychiatric:         Mood and Affect: Mood normal.         Behavior: Behavior normal.

## 2025-03-08 ENCOUNTER — OFFICE VISIT (OUTPATIENT)
Dept: URGENT CARE | Age: 40
End: 2025-03-08
Payer: COMMERCIAL

## 2025-03-08 VITALS
HEART RATE: 78 BPM | DIASTOLIC BLOOD PRESSURE: 61 MMHG | SYSTOLIC BLOOD PRESSURE: 119 MMHG | OXYGEN SATURATION: 98 % | RESPIRATION RATE: 16 BRPM | TEMPERATURE: 98.2 F

## 2025-03-08 DIAGNOSIS — R39.9 UTI SYMPTOMS: Primary | ICD-10-CM

## 2025-03-08 LAB
SL AMB  POCT GLUCOSE, UA: ABNORMAL
SL AMB LEUKOCYTE ESTERASE,UA: ABNORMAL
SL AMB POCT BILIRUBIN,UA: ABNORMAL
SL AMB POCT BLOOD,UA: ABNORMAL
SL AMB POCT CLARITY,UA: ABNORMAL
SL AMB POCT COLOR,UA: YELLOW
SL AMB POCT KETONES,UA: ABNORMAL
SL AMB POCT NITRITE,UA: ABNORMAL
SL AMB POCT PH,UA: 8.5
SL AMB POCT SPECIFIC GRAVITY,UA: 1
SL AMB POCT URINE HCG: NORMAL
SL AMB POCT URINE PROTEIN: ABNORMAL
SL AMB POCT UROBILINOGEN: 0.2

## 2025-03-08 PROCEDURE — 81002 URINALYSIS NONAUTO W/O SCOPE: CPT

## 2025-03-08 PROCEDURE — 81025 URINE PREGNANCY TEST: CPT

## 2025-03-08 PROCEDURE — 99213 OFFICE O/P EST LOW 20 MIN: CPT

## 2025-03-08 PROCEDURE — 87086 URINE CULTURE/COLONY COUNT: CPT

## 2025-03-08 RX ORDER — NITROFURANTOIN 25; 75 MG/1; MG/1
100 CAPSULE ORAL 2 TIMES DAILY
Qty: 10 CAPSULE | Refills: 0 | Status: SHIPPED | OUTPATIENT
Start: 2025-03-08 | End: 2025-03-08 | Stop reason: CLARIF

## 2025-03-08 RX ORDER — CEPHALEXIN 500 MG/1
500 CAPSULE ORAL EVERY 8 HOURS SCHEDULED
Qty: 15 CAPSULE | Refills: 0 | Status: SHIPPED | OUTPATIENT
Start: 2025-03-08 | End: 2025-03-13

## 2025-03-08 NOTE — PROGRESS NOTES
[facility]  Name: Doreen Terrell      : 1985      MRN: [id number]  Encounter Provider: SHARRON De La Vega  Encounter Date: 3/8/2025   Encounter department: [facility] [place]  :  Urine dip performed in office suggests possible infection, urine culture results pending and should be available in MyChart within 48-72 hours.   Please begin antibiotics as directed.   Stay well hydrated, may use Azo or cranberry juice products as needed for symptoms relief.   Follow up with PCP if no relief within 3-5 days.   Go to ED for fever or flank pain.    Assessment & Plan  UTI symptoms    Orders:    POCT urine dip    Urine culture    POCT urine HCG    cephalexin (KEFLEX) 500 mg capsule; Take 1 capsule (500 mg total) by mouth every 8 (eight) hours for 5 days        Patient Instructions  Patient Education     Urinary Tract Infection, Adult ED   General Information   You came to the Emergency Department (ED) for a urinary tract infection or UTI. Most UTIs are infections in either your bladder or your kidneys. Bladder infections are more common and may also be called cystitis. Kidney infections are more serious and may also be called pyelonephritis. You need antibiotics to treat a UTI. It is important to take all of your antibiotics even if you start to feel better.  What care is needed at home?   Call your regular doctor to let them know you were in the ED. Make a follow-up appointment if you were told to.  For the first day or so, you may want to take an over-the-counter medicine, like phenazopyridine. This will help to numb your bladder. You will also not have the strong urge to urinate. This medicine causes your urine and tears to look orange. If you have kidney disease, talk to your doctor before taking this medicine.  To lower your chance of getting a UTI in the future, you can:  Drink extra fluids.  If you have sex, urinate right afterwards.  When do I need to get emergency help?   Return to  the ED if:   You have very bad pain in your back, shoulder, or belly.  You have a fever of 102.2°F (39°C); shaking chills or sweats even though you are taking antibiotics.  When do I need to call the doctor?   You have a fever up to 100.4°F (38°C).  You notice more blood in your urine.  Your signs get worse or do not improve within 24 hours of starting treatment.  You are not able to urinate for more than 8 hours  Your signs come back after treatment has stopped.  You have new or worsening symptoms.  Last Reviewed Date   2021-03-12  Consumer Information Use and Disclaimer   This generalized information is a limited summary of diagnosis, treatment, and/or medication information. It is not meant to be comprehensive and should be used as a tool to help the user understand and/or assess potential diagnostic and treatment options. It does NOT include all information about conditions, treatments, medications, side effects, or risks that may apply to a specific patient. It is not intended to be medical advice or a substitute for the medical advice, diagnosis, or treatment of a health care provider based on the health care provider's examination and assessment of a patient’s specific and unique circumstances. Patients must speak with a health care provider for complete information about their health, medical questions, and treatment options, including any risks or benefits regarding use of medications. This information does not endorse any treatments or medications as safe, effective, or approved for treating a specific patient. Up[employer], Inc[employer] and its affiliates disclaim any warranty or liability relating to this information or the use thereof. The use of this information is governed by the Terms of Use, available at https://www.woltersipadiouwer.com/en/know/clinical-effectiveness-terms   Copyright   Copyright © 2024 [employer]ToDate, Inc. and its affiliates and/or licensors. All rights reserved.  Follow up with PCP in 3-5  days.  Proceed to  ER if symptoms worsen.    If tests are performed, our office will contact you with results only if changes need to made to the care plan discussed with you at the visit. You can review your full results on [facility] MyChart.    Chief Complaint:   Chief Complaint   Patient presents with    Possible UTI     For about a week patient has been self treating UTI symptoms without improvement. Patient has low back pain and burning with urination.      History of Present Illness   Urinary Frequency   This is a new problem. The current episode started in the past 7 days (x 2 days). The problem occurs every urination. The problem has been gradually worsening. The quality of the pain is described as burning. The patient is experiencing no pain. There has been no fever. She is Sexually active (In monogamous relationship, denies concern for STI). There is No history of pyelonephritis. Associated symptoms include frequency and urgency. Pertinent negatives include no chills, discharge, flank pain, hematuria, hesitancy, nausea, possible pregnancy, sweats or vomiting. She has tried home medications for the symptoms. The treatment provided mild relief. There is no history of catheterization, kidney stones, recurrent UTIs, a single kidney, urinary stasis or a urological procedure.         Review of Systems   Constitutional:  Negative for chills, fatigue and fever.   HENT:  Negative for congestion, ear discharge, ear pain, postnasal drip, rhinorrhea, sinus pressure, sinus pain, sneezing and sore throat.    Eyes: Negative.  Negative for pain, discharge, redness and itching.   Respiratory: Negative.  Negative for apnea, cough, choking, chest tightness, shortness of breath, wheezing and stridor.    Cardiovascular: Negative.  Negative for chest pain and palpitations.   Gastrointestinal: Negative.  Negative for diarrhea, nausea and vomiting.   Endocrine: Negative.  Negative for polydipsia, polyphagia and polyuria.    Genitourinary:  Positive for dysuria, frequency and urgency. Negative for decreased urine volume, difficulty urinating, dyspareunia, enuresis, flank pain, genital sores, hematuria, hesitancy, menstrual problem, pelvic pain, vaginal bleeding, vaginal discharge and vaginal pain.   Musculoskeletal: Negative.  Negative for arthralgias, back pain, myalgias, neck pain and neck stiffness.   Skin: Negative.  Negative for color change and rash.   Allergic/Immunologic: Negative.  Negative for environmental allergies.   Neurological: Negative.  Negative for dizziness, facial asymmetry, light-headedness, numbness and headaches.   Hematological: Negative.  Negative for adenopathy.   Psychiatric/Behavioral: Negative.       Past Medical History   Past Medical History:   Diagnosis Date    Abnormal Pap smear of cervix     HPV,leep    AMA (advanced maternal age) multigravida 35+, third trimester 2021    Back pain     GERD (gastroesophageal reflux disease)     none presantly    GERD without esophagitis 2016    Gestational diabetes w/ 1st pregnancy    H/O shoulder dystocia in prior pregnancy, currently pregnant, first trimester 10/03/2024    Helicobacter pylori (H. pylori) infection cleared after treatment    History of postpartum hemorrhage, currently pregnant 2021    History of shoulder dystocia in prior pregnancy, currently pregnant in third trimester 2021    Varicella     as child     Past Surgical History:   Procedure Laterality Date    CERVICAL BIOPSY  W/ LOOP ELECTRODE EXCISION          COLONOSCOPY      MA ESOPHAGOGASTRODUODENOSCOPY TRANSORAL DIAGNOSTIC N/A 2016    Procedure: ESOPHAGOGASTRODUODENOSCOPY (EGD);  Surgeon: Thomas Lopez MD;  Location: [facility] GI LAB;  Service: Gastroenterology    [place] INDUCED  DILATION & EVACUATION N/A 2024    Procedure: EXAM UNDER ANESTHESIA; DILATATION AND EVACUATION (D&E) (17 WEEKS);  Surgeon: Ashanti Morrow MD;  [facility];  Service:  "Gynecology    WISDOM TOOTH EXTRACTION Bilateral 2000     Family History   Problem Relation Age of Onset    Hyperlipidemia Mother     Depression Father     Hyperlipidemia Father     No Known Problems Daughter     No Known Problems Daughter     Frontotemporal dementia Maternal Grandmother     Cancer Maternal Grandfather         bile duct cancer/stomach ca    Parkinsonism Paternal Grandmother     Heart disease Paternal Grandfather         MI    Depression Paternal Grandfather     Heart attack Paternal Grandfather 50 - 59         in his 50\"s    Depression Brother     Hypertension Brother     No Known Problems Maternal Aunt     No Known Problems Paternal Aunt      she reports that she quit smoking about 8 years ago. Her smoking use included cigarettes. She started smoking about 16 years ago. She has a 2.5 pack-year smoking history. She has never used smokeless tobacco. She reports that she does not currently use alcohol after a past usage of about 1.0 standard drink of alcohol per week. She reports that she does not use drugs.  Current Outpatient Medications   Medication Instructions    Biotin 5000 MCG CAPS Take by mouth    cephalexin (KEFLEX) 500 mg, Oral, Every 8 hours scheduled    Co Q-10 100 mg    Cyanocobalamin (Vitamin B12 TR) 2000 MCG TBCR 1 Dose, Weekly    folic acid (FOLVITE) 800 mcg, Daily    Kelp 0.15 MG TABS     Multiple Vitamins-Minerals (MULTIVITAL PO) Take by mouth    omega-3-acid ethyl esters (LOVAZA) 500 mg, Daily    VITAMIN D, CHOLECALCIFEROL, PO 2,000 Units, Daily    Zinc 50 MG TABS    No Known Allergies     Objective   /61   Pulse 78   Temp 98.2 °F (36.8 °C)   Resp 16   SpO2 98%      Physical Exam  Vitals and nursing note reviewed.   Constitutional:       General: She is not in acute distress.     Appearance: She is well-developed. She is not ill-appearing, toxic-appearing or diaphoretic.      Interventions: She is not intubated.  HENT:      Head: Normocephalic and atraumatic.      Right " "Ear: External ear normal.      Left Ear: External ear normal.   Eyes:      Conjunctiva/sclera: Conjunctivae normal.   Cardiovascular:      Rate and Rhythm: Normal rate and regular rhythm.      Pulses: Normal pulses.      Heart sounds: Normal heart sounds, S1 normal and S2 normal. Heart sounds not distant. No murmur heard.     No friction rub. No gallop.   Pulmonary:      Effort: Pulmonary effort is normal. No tachypnea, bradypnea, accessory muscle usage, prolonged expiration, respiratory distress or retractions. She is not intubated.      Breath sounds: No stridor, decreased air movement or transmitted upper airway sounds. No decreased breath sounds, wheezing, rhonchi or rales.   Abdominal:      General: Abdomen is flat.      Palpations: Abdomen is soft.      Tenderness: There is no abdominal tenderness. There is no right CVA tenderness or left CVA tenderness.   Musculoskeletal:         General: No swelling.      Cervical back: Neck supple. No rigidity.   Lymphadenopathy:      Cervical: No cervical adenopathy.   Skin:     General: Skin is warm and dry.      Capillary Refill: Capillary refill takes less than 2 seconds.   Neurological:      Mental Status: She is alert.   Psychiatric:         Mood and Affect: Mood normal.         Portions of the record may have been created with voice recognition software.  Occasional wrong word or \"sound a like\" substitutions may have occurred due to the inherent limitations of voice recognition software.  Read the chart carefully and recognize, using context, where substitutions have occurred.  "

## 2025-03-09 ENCOUNTER — RESULTS FOLLOW-UP (OUTPATIENT)
Dept: URGENT CARE | Age: 40
End: 2025-03-09

## 2025-03-09 LAB — BACTERIA UR CULT: NORMAL

## 2025-03-24 NOTE — PROGRESS NOTES
ASSESSMENT & PLAN:   Diagnoses and all orders for this visit:    Well woman exam with routine gynecological exam  -     Liquid-based pap, screening    Encounter for screening for malignant neoplasm of cervix  -     Liquid-based pap, screening    Screening for HPV (human papillomavirus)  -     Liquid-based pap, screening    Encounter for screening mammogram for malignant neoplasm of breast  -     Mammo screening bilateral w 3d and cad; Future          The following were reviewed in today's visit: ASCCP guidelines, Gardasil vaccination, STD testing and family planning. We reviewed taking a prenatal vitamin daily as she is considering another pregnancy.    Patient to return to office in yearly for annual exam.     All questions have been answered to her satisfaction.        CC:  Annual Gynecologic Examination  Chief Complaint   Patient presents with    Gynecologic Exam     Last pap 2022 Neg pap/ Neg hpv   Mammo 01/15/2025 Benign   TC risk 21.98%   Colonoscopy 2021 Repeat in 5 yrs        HPI: Doreen Terrell is a 39 y.o.  who presents for annual gynecologic examination.  She has the following concerns:  none      Health Maintenance:    Exercise: frequently  Breast exams/breast awareness: yes  Last mammogram: 2025, wnl    Past Medical History:   Diagnosis Date    Abnormal Pap smear of cervix     HPV,leep    AMA (advanced maternal age) multigravida 35+, third trimester 2021    Back pain     GERD (gastroesophageal reflux disease)     none presantly    GERD without esophagitis 2016    Gestational diabetes w/ 1st pregnancy    H/O shoulder dystocia in prior pregnancy, currently pregnant, first trimester 10/03/2024    Helicobacter pylori (H. pylori) infection cleared after treatment    History of postpartum hemorrhage, currently pregnant 2021    History of shoulder dystocia in prior pregnancy, currently pregnant in third trimester 2021    Varicella     as child       Past  "Surgical History:   Procedure Laterality Date    CERVICAL BIOPSY  W/ LOOP ELECTRODE EXCISION          COLONOSCOPY      SC ESOPHAGOGASTRODUODENOSCOPY TRANSORAL DIAGNOSTIC N/A 2016    Procedure: ESOPHAGOGASTRODUODENOSCOPY (EGD);  Surgeon: Thomas Lopez MD;  Location: [facility];  Service: Gastroenterology    SC INDUCED  DILATION & EVACUATION N/A 2024    Procedure: EXAM UNDER ANESTHESIA; DILATATION AND EVACUATION (D&E) (17 WEEKS);  Surgeon: Ashanti Morrow MD;  Location: [facility];  Service: Gynecology    WISDOM TOOTH EXTRACTION Bilateral 2000       Past OB/Gyn History:  Period Cycle (Days): 28  Period Duration (Days): 3-4  Period Pattern: Regular  Menstrual Flow: Moderate  Dysmenorrhea: NonePatient's last menstrual period was 2025.    Last Pap: 21 : no abnormalities  History of abnormal Pap smear: Yes, a long time ago  HPV vaccine completed: yes    Patient is currently sexually active.   STD testing: no  Current contraception: none, considering another pregnancy      Family History  Family History   Problem Relation Age of Onset    Hyperlipidemia Mother     Depression Father     Hyperlipidemia Father     No Known Problems Daughter     No Known Problems Daughter     Frontotemporal dementia Maternal Grandmother     Cancer Maternal Grandfather         bile duct cancer/stomach ca    Parkinsonism Paternal Grandmother     Heart disease Paternal Grandfather         MI    Depression Paternal Grandfather     Heart attack Paternal Grandfather 50 - 59         in his 50\"s    Depression Brother     Hypertension Brother     No Known Problems Maternal Aunt     No Known Problems Paternal Aunt        Family history of uterine or ovarian cancer: no  Family history of breast cancer: no  Family history of colon cancer: no    Social History:  Social History     Socioeconomic History    Marital status: /Civil Union     Spouse name: Brandon    Number of children: 2    Years of education: Not on " file    Highest education level: Associate degree: academic program   Occupational History     Comment: Works at the  [employer]   Tobacco Use    Smoking status: Former     Current packs/day: 0.00     Average packs/day: 0.3 packs/day for 10.0 years (2.5 ttl pk-yrs)     Types: Cigarettes     Start date: 2009     Quit date: 2017     Years since quittin.2    Smokeless tobacco: Never    Tobacco comments:     college   Vaping Use    Vaping status: Never Used   Substance and Sexual Activity    Alcohol use: Not Currently     Alcohol/week: 1.0 standard drink of alcohol     Comment: social    Drug use: No    Sexual activity: Yes     Partners: Male     Birth control/protection: Condom Male     Comment: Brandon   Other Topics Concern    Not on file   Social History Narrative    Not on file     Social Drivers of Health     Financial Resource Strain: Not on file   Food Insecurity: No Food Insecurity (10/1/2024)    Nursing - Inadequate Food Risk Classification     Worried About Running Out of Food in the Last Year: Never true     Ran Out of Food in the Last Year: Never true     Ran Out of Food in the Last Year: Not on file   Transportation Needs: No Transportation Needs (10/1/2024)    PRAPARE - Transportation     Lack of Transportation (Medical): No     Lack of Transportation (Non-Medical): No   Physical Activity: Not on file   Stress: Not on file   Social Connections: Not on file   Intimate Partner Violence: Not on file   Housing Stability: Low Risk  (10/1/2024)    Housing Stability Vital Sign     Unable to Pay for Housing in the Last Year: No     Number of Times Moved in the Last Year: 0     Homeless in the Last Year: No     Domestic violence screen: negative    Allergies:  No Known Allergies    Medications:    Current Outpatient Medications:     Biotin 5000 MCG CAPS, Take by mouth, Disp: , Rfl:     Co Q-10 50 MG, Take 100 mg by mouth, Disp: , Rfl:     Cyanocobalamin (Vitamin B12 TR) 2000 MCG TBCR, Take 1 Dose by mouth  "once a week 5000mcg, Disp: , Rfl:     folic acid (FOLVITE) 1 mg tablet, Take 800 mcg by mouth daily, Disp: , Rfl:     Kelp 0.15 MG TABS, , Disp: , Rfl:     Multiple Vitamins-Minerals (MULTIVITAL PO), Take by mouth, Disp: , Rfl:     omega-3-acid ethyl esters (LOVAZA) 1 g capsule, Take 500 mg by mouth daily OMEGA +  DHA= 500MG  - KELT, Disp: , Rfl:     VITAMIN D, CHOLECALCIFEROL, PO, Take 2,000 Units by mouth daily, Disp: , Rfl:     Zinc 50 MG TABS, , Disp: , Rfl:     Review of Systems:  Review of Systems      Physical Exam:  /64 (BP Location: Right arm, Patient Position: Sitting, Cuff Size: Standard)   Ht 5' 2.36\" (1.584 m)   Wt 75.3 kg (166 lb)   LMP 03/19/2025   BMI 30.01 kg/m²    Physical Exam  Constitutional:       Appearance: Normal appearance.   Genitourinary:      Right Labia: No rash, lesions or skin changes.     Left Labia: No lesions, skin changes or rash.       Right Adnexa: not tender and no mass present.     Left Adnexa: not tender and no mass present.     No cervical motion tenderness, discharge or lesion.      Uterus is not enlarged or tender.   Breasts:     Right: No mass, nipple discharge, skin change or tenderness.      Left: No mass, nipple discharge, skin change or tenderness.   HENT:      Head: Normocephalic and atraumatic.   Eyes:      Extraocular Movements: Extraocular movements intact.   Cardiovascular:      Rate and Rhythm: Normal rate and regular rhythm.   Pulmonary:      Effort: Pulmonary effort is normal.      Breath sounds: Normal breath sounds.   Abdominal:      General: There is no distension.      Palpations: Abdomen is soft.      Tenderness: There is no abdominal tenderness. There is no guarding.   Musculoskeletal:         General: Normal range of motion.   Neurological:      Mental Status: She is alert. Mental status is at baseline.   Skin:     General: Skin is warm and dry.   Psychiatric:         Mood and Affect: Mood normal.         Behavior: Behavior normal. "

## 2025-03-27 ENCOUNTER — ANNUAL EXAM (OUTPATIENT)
Dept: OBGYN CLINIC | Facility: CLINIC | Age: 40
End: 2025-03-27
Payer: COMMERCIAL

## 2025-03-27 VITALS
SYSTOLIC BLOOD PRESSURE: 122 MMHG | BODY MASS INDEX: 30.55 KG/M2 | HEIGHT: 62 IN | WEIGHT: 166 LBS | DIASTOLIC BLOOD PRESSURE: 64 MMHG

## 2025-03-27 DIAGNOSIS — Z12.31 ENCOUNTER FOR SCREENING MAMMOGRAM FOR MALIGNANT NEOPLASM OF BREAST: ICD-10-CM

## 2025-03-27 DIAGNOSIS — Z12.4 ENCOUNTER FOR SCREENING FOR MALIGNANT NEOPLASM OF CERVIX: ICD-10-CM

## 2025-03-27 DIAGNOSIS — Z11.51 SCREENING FOR HPV (HUMAN PAPILLOMAVIRUS): ICD-10-CM

## 2025-03-27 DIAGNOSIS — Z01.419 WELL WOMAN EXAM WITH ROUTINE GYNECOLOGICAL EXAM: Primary | ICD-10-CM

## 2025-03-27 PROCEDURE — S0612 ANNUAL GYNECOLOGICAL EXAMINA: HCPCS | Performed by: OBSTETRICS & GYNECOLOGY

## 2025-03-27 PROCEDURE — G0145 SCR C/V CYTO,THINLAYER,RESCR: HCPCS | Performed by: OBSTETRICS & GYNECOLOGY

## 2025-03-27 PROCEDURE — G0476 HPV COMBO ASSAY CA SCREEN: HCPCS | Performed by: OBSTETRICS & GYNECOLOGY

## 2025-04-01 ENCOUNTER — RESULTS FOLLOW-UP (OUTPATIENT)
Dept: OBGYN CLINIC | Facility: CLINIC | Age: 40
End: 2025-04-01

## 2025-04-01 LAB
LAB AP GYN PRIMARY INTERPRETATION: NORMAL
Lab: NORMAL

## 2025-08-22 LAB
ALBUMIN SERPL BCG-MCNC: 4.5 G/DL (ref 3.5–5)
ALP SERPL-CCNC: 69 U/L (ref 34–104)
ALT SERPL W P-5'-P-CCNC: 19 U/L (ref 7–52)
ANION GAP SERPL CALCULATED.3IONS-SCNC: 12 MMOL/L (ref 4–13)
AST SERPL W P-5'-P-CCNC: 19 U/L (ref 13–39)
BASOPHILS # BLD AUTO: 0.08 THOUSANDS/ÂΜL (ref 0–0.1)
BASOPHILS NFR BLD AUTO: 1 % (ref 0–1)
BILIRUB SERPL-MCNC: 0.6 MG/DL (ref 0.2–1)
BUN SERPL-MCNC: 7 MG/DL (ref 5–25)
CALCIUM SERPL-MCNC: 9.2 MG/DL (ref 8.4–10.2)
CHLORIDE SERPL-SCNC: 101 MMOL/L (ref 96–108)
CHOLEST SERPL-MCNC: 222 MG/DL (ref ?–200)
CO2 SERPL-SCNC: 24 MMOL/L (ref 21–32)
CREAT SERPL-MCNC: 0.73 MG/DL (ref 0.6–1.3)
EOSINOPHIL # BLD AUTO: 0.22 THOUSAND/ÂΜL (ref 0–0.61)
EOSINOPHIL NFR BLD AUTO: 2 % (ref 0–6)
ERYTHROCYTE [DISTWIDTH] IN BLOOD BY AUTOMATED COUNT: 12.7 % (ref 11.6–15.1)
EST. AVERAGE GLUCOSE BLD GHB EST-MCNC: 114 MG/DL
GFR SERPL CREATININE-BSD FRML MDRD: 103 ML/MIN/1.73SQ M
GLUCOSE P FAST SERPL-MCNC: 98 MG/DL (ref 65–99)
HBA1C MFR BLD: 5.6 %
HCT VFR BLD AUTO: 44.1 % (ref 34.8–46.1)
HDLC SERPL-MCNC: 61 MG/DL
HGB BLD-MCNC: 14.7 G/DL (ref 11.5–15.4)
IMM GRANULOCYTES # BLD AUTO: 0.06 THOUSAND/UL (ref 0–0.2)
IMM GRANULOCYTES NFR BLD AUTO: 1 % (ref 0–2)
LDLC SERPL CALC-MCNC: 110 MG/DL (ref 0–100)
LYMPHOCYTES # BLD AUTO: 3.85 THOUSANDS/ÂΜL (ref 0.6–4.47)
LYMPHOCYTES NFR BLD AUTO: 33 % (ref 14–44)
MCH RBC QN AUTO: 31.1 PG (ref 26.8–34.3)
MCHC RBC AUTO-ENTMCNC: 33.3 G/DL (ref 31.4–37.4)
MCV RBC AUTO: 93 FL (ref 82–98)
MONOCYTES # BLD AUTO: 0.71 THOUSAND/ÂΜL (ref 0.17–1.22)
MONOCYTES NFR BLD AUTO: 6 % (ref 4–12)
NEUTROPHILS # BLD AUTO: 6.72 THOUSANDS/ÂΜL (ref 1.85–7.62)
NEUTS SEG NFR BLD AUTO: 57 % (ref 43–75)
NONHDLC SERPL-MCNC: 161 MG/DL
NRBC BLD AUTO-RTO: 0 /100 WBCS
PLATELET # BLD AUTO: 298 THOUSANDS/UL (ref 149–390)
PMV BLD AUTO: 9.9 FL (ref 8.9–12.7)
POTASSIUM SERPL-SCNC: 4.2 MMOL/L (ref 3.5–5.3)
PROT SERPL-MCNC: 8 G/DL (ref 6.4–8.4)
RBC # BLD AUTO: 4.73 MILLION/UL (ref 3.81–5.12)
SODIUM SERPL-SCNC: 137 MMOL/L (ref 135–147)
TRIGL SERPL-MCNC: 255 MG/DL (ref ?–150)
WBC # BLD AUTO: 11.64 THOUSAND/UL (ref 4.31–10.16)

## (undated) DEVICE — PREMIUM DRY TRAY LF: Brand: MEDLINE INDUSTRIES, INC.

## (undated) DEVICE — SPONGE LAP 18 X 18 IN STRL RFD

## (undated) DEVICE — D + E CONNECTION HOSE

## (undated) DEVICE — STERILE LUBRICATING JELLY, TUBE: Brand: HR LUBRICATING JELLY

## (undated) DEVICE — STRL ALLENTOWN HYSTEROSCOPY PK: Brand: CARDINAL HEALTH

## (undated) DEVICE — PVC URETHRAL CATHETER: Brand: DOVER

## (undated) DEVICE — D + E SUCTION CANISTER

## (undated) DEVICE — CHLORHEXIDINE 4PCT 4 OZ

## (undated) DEVICE — COLLECTION SET, DISPOSABLE WITH HANDLE AND TAPERED FITTINGS TUBING, 6 FT (183 CM) (10/PK): Brand: GYRUS ACMI

## (undated) DEVICE — D + E SAFE TOUCH TISSUE TRAP (CIRCON)